# Patient Record
Sex: MALE | Race: WHITE
[De-identification: names, ages, dates, MRNs, and addresses within clinical notes are randomized per-mention and may not be internally consistent; named-entity substitution may affect disease eponyms.]

---

## 2017-11-15 ENCOUNTER — HOSPITAL ENCOUNTER (EMERGENCY)
Dept: HOSPITAL 84 - D.ER | Age: 62
Discharge: HOME | End: 2017-11-15
Payer: MEDICAID

## 2017-11-15 VITALS — BODY MASS INDEX: 23.7 KG/M2

## 2017-11-15 DIAGNOSIS — I44.1: ICD-10-CM

## 2017-11-15 DIAGNOSIS — I45.10: ICD-10-CM

## 2017-11-15 DIAGNOSIS — R07.9: Primary | ICD-10-CM

## 2017-11-15 DIAGNOSIS — I25.10: ICD-10-CM

## 2017-11-15 DIAGNOSIS — I10: ICD-10-CM

## 2017-11-15 LAB
ALBUMIN SERPL-MCNC: 3.8 G/DL (ref 3.4–5)
ALP SERPL-CCNC: 139 U/L (ref 46–116)
ALT SERPL-CCNC: 41 U/L (ref 10–68)
ANION GAP SERPL CALC-SCNC: 12.6 MMOL/L (ref 8–16)
BASOPHILS NFR BLD AUTO: 1 % (ref 0–2)
BILIRUB SERPL-MCNC: 0.44 MG/DL (ref 0.2–1.3)
BUN SERPL-MCNC: 9 MG/DL (ref 7–18)
CALCIUM SERPL-MCNC: 5.6 MG/DL (ref 8.5–10.1)
CHLORIDE SERPL-SCNC: 96 MMOL/L (ref 98–107)
CK MB SERPL-MCNC: 2.5 U/L (ref 0–3.6)
CK SERPL-CCNC: 834 UL (ref 21–232)
CO2 SERPL-SCNC: 30.4 MMOL/L (ref 21–32)
CREAT SERPL-MCNC: 0.9 MG/DL (ref 0.6–1.3)
EOSINOPHIL NFR BLD: 2.5 % (ref 0–7)
ERYTHROCYTE [DISTWIDTH] IN BLOOD BY AUTOMATED COUNT: 17.2 % (ref 11.5–14.5)
GLOBULIN SER-MCNC: 4.9 G/L
GLUCOSE SERPL-MCNC: 84 MG/DL (ref 74–106)
HCT VFR BLD CALC: 39 % (ref 42–54)
HGB BLD-MCNC: 13.3 G/DL (ref 13.5–17.5)
IMM GRANULOCYTES NFR BLD: 0.7 % (ref 0–5)
LYMPHOCYTES NFR BLD AUTO: 42.2 % (ref 15–50)
MCH RBC QN AUTO: 33.8 PG (ref 26–34)
MCHC RBC AUTO-ENTMCNC: 34.1 G/DL (ref 31–37)
MCV RBC: 99.2 FL (ref 80–100)
MONOCYTES NFR BLD: 8.9 % (ref 2–11)
NEUTROPHILS NFR BLD AUTO: 44.7 % (ref 40–80)
OSMOLALITY SERPL CALC.SUM OF ELEC: 269 MOSM/KG (ref 275–300)
PLATELET # BLD: 158 10X3/UL (ref 130–400)
PMV BLD AUTO: 9.8 FL (ref 7.4–10.4)
POTASSIUM SERPL-SCNC: 3 MMOL/L (ref 3.5–5.1)
PROT SERPL-MCNC: 8.7 G/DL (ref 6.4–8.2)
RBC # BLD AUTO: 3.93 10X6/UL (ref 4.2–6.1)
SODIUM SERPL-SCNC: 136 MMOL/L (ref 136–145)
TROPONIN I SERPL-MCNC: 0.11 NG/ML (ref 0–0.06)
WBC # BLD AUTO: 5.9 10X3/UL (ref 4.8–10.8)

## 2017-12-14 ENCOUNTER — HOSPITAL ENCOUNTER (INPATIENT)
Dept: HOSPITAL 84 - D.ER | Age: 62
LOS: 19 days | Discharge: HOME HEALTH | DRG: 896 | End: 2018-01-02
Attending: FAMILY MEDICINE | Admitting: FAMILY MEDICINE
Payer: MEDICAID

## 2017-12-14 VITALS — SYSTOLIC BLOOD PRESSURE: 138 MMHG | DIASTOLIC BLOOD PRESSURE: 84 MMHG

## 2017-12-14 VITALS
BODY MASS INDEX: 28.41 KG/M2 | HEIGHT: 70 IN | WEIGHT: 198.42 LBS | BODY MASS INDEX: 28.41 KG/M2 | BODY MASS INDEX: 28.41 KG/M2 | WEIGHT: 198.42 LBS | BODY MASS INDEX: 28.41 KG/M2 | HEIGHT: 70 IN

## 2017-12-14 VITALS — DIASTOLIC BLOOD PRESSURE: 58 MMHG | SYSTOLIC BLOOD PRESSURE: 102 MMHG
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
## 2017-12-14 DIAGNOSIS — I11.0: ICD-10-CM

## 2017-12-14 DIAGNOSIS — E87.6: ICD-10-CM

## 2017-12-14 DIAGNOSIS — Z91.19: ICD-10-CM

## 2017-12-14 DIAGNOSIS — N17.9: ICD-10-CM

## 2017-12-14 DIAGNOSIS — K29.71: ICD-10-CM

## 2017-12-14 DIAGNOSIS — T88.59XA: ICD-10-CM

## 2017-12-14 DIAGNOSIS — I50.22: ICD-10-CM

## 2017-12-14 DIAGNOSIS — F10.229: Primary | ICD-10-CM

## 2017-12-14 DIAGNOSIS — E03.9: ICD-10-CM

## 2017-12-14 DIAGNOSIS — R00.1: ICD-10-CM

## 2017-12-14 DIAGNOSIS — G47.33: ICD-10-CM

## 2017-12-14 DIAGNOSIS — E83.51: ICD-10-CM

## 2017-12-14 DIAGNOSIS — F10.239: ICD-10-CM

## 2017-12-14 DIAGNOSIS — E86.0: ICD-10-CM

## 2017-12-14 DIAGNOSIS — K44.9: ICD-10-CM

## 2017-12-14 DIAGNOSIS — K29.80: ICD-10-CM

## 2017-12-14 DIAGNOSIS — I95.9: ICD-10-CM

## 2017-12-14 DIAGNOSIS — I34.0: ICD-10-CM

## 2017-12-14 DIAGNOSIS — K70.11: ICD-10-CM

## 2017-12-14 DIAGNOSIS — E46: ICD-10-CM

## 2017-12-14 DIAGNOSIS — D69.6: ICD-10-CM

## 2017-12-14 DIAGNOSIS — E87.0: ICD-10-CM

## 2017-12-14 DIAGNOSIS — G20: ICD-10-CM

## 2017-12-14 DIAGNOSIS — K86.0: ICD-10-CM
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
## 2017-12-14 DIAGNOSIS — F17.200: ICD-10-CM

## 2017-12-14 DIAGNOSIS — F10.27: ICD-10-CM

## 2017-12-14 DIAGNOSIS — K70.40: ICD-10-CM

## 2017-12-14 DIAGNOSIS — K21.9: ICD-10-CM

## 2017-12-14 LAB
ALBUMIN SERPL-MCNC: 3.3 G/DL (ref 3.4–5)
ALBUMIN SERPL-MCNC: 3.7 G/DL (ref 3.4–5)
ALP SERPL-CCNC: 136 U/L (ref 46–116)
ALP SERPL-CCNC: 151 U/L (ref 46–116)
ALT SERPL-CCNC: 111 U/L (ref 10–68)
ALT SERPL-CCNC: 111 U/L (ref 10–68)
AMPHETAMINES UR QL SCN: NEGATIVE QUAL
ANION GAP SERPL CALC-SCNC: 24.3 MMOL/L (ref 8–16)
ANION GAP SERPL CALC-SCNC: 27.8 MMOL/L (ref 8–16)
APPEARANCE UR: (no result)
BACTERIA #/AREA URNS HPF: (no result) /HPF
BARBITURATES UR QL SCN: NEGATIVE QUAL
BASOPHILS NFR BLD AUTO: 0.1 % (ref 0–2)
BASOPHILS NFR BLD AUTO: 0.3 % (ref 0–2)
BENZODIAZ UR QL SCN: NEGATIVE QUAL
BILIRUB SERPL-MCNC: (no result) MG/DL
BILIRUB SERPL-MCNC: 3.52 MG/DL (ref 0.2–1.3)
BILIRUB SERPL-MCNC: 4.42 MG/DL (ref 0.2–1.3)
BUN SERPL-MCNC: 47 MG/DL (ref 7–18)
BUN SERPL-MCNC: 51 MG/DL (ref 7–18)
BZE UR QL SCN: NEGATIVE QUAL
CALCIUM SERPL-MCNC: 4.8 MG/DL (ref 8.5–10.1)
CALCIUM SERPL-MCNC: 5.3 MG/DL (ref 8.5–10.1)
CANNABINOIDS UR QL SCN: NEGATIVE QUAL
CHLORIDE SERPL-SCNC: 100 MMOL/L (ref 98–107)
CHLORIDE SERPL-SCNC: 105 MMOL/L (ref 98–107)
CO2 SERPL-SCNC: 20.8 MMOL/L (ref 21–32)
CO2 SERPL-SCNC: 21.4 MMOL/L (ref 21–32)
COLOR UR: (no result)
CREAT SERPL-MCNC: 1.7 MG/DL (ref 0.6–1.3)
CREAT SERPL-MCNC: 2.5 MG/DL (ref 0.6–1.3)
EOSINOPHIL NFR BLD: 0 % (ref 0–7)
EOSINOPHIL NFR BLD: 0 % (ref 0–7)
ERYTHROCYTE [DISTWIDTH] IN BLOOD BY AUTOMATED COUNT: 17.6 % (ref 11.5–14.5)
ERYTHROCYTE [DISTWIDTH] IN BLOOD BY AUTOMATED COUNT: 17.7 % (ref 11.5–14.5)
ETHANOL SERPL-MCNC: 179 MG/DL (ref 0–10)
GLOBULIN SER-MCNC: 3.8 G/L
GLOBULIN SER-MCNC: 4.2 G/L
GLUCOSE SERPL-MCNC: 102 MG/DL (ref 74–106)
GLUCOSE SERPL-MCNC: 86 MG/DL (ref 74–106)
GLUCOSE SERPL-MCNC: NEGATIVE MG/DL
GRAN CASTS #/AREA URNS LPF: (no result) /LPF
HCT VFR BLD CALC: 26.8 % (ref 42–54)
HCT VFR BLD CALC: 27.1 % (ref 42–54)
HGB BLD-MCNC: 8.9 G/DL (ref 13.5–17.5)
HGB BLD-MCNC: 9.2 G/DL (ref 13.5–17.5)
HYALINE CASTS #/AREA URNS LPF: (no result) /LPF
IMM GRANULOCYTES NFR BLD: 1.2 % (ref 0–5)
IMM GRANULOCYTES NFR BLD: 1.6 % (ref 0–5)
KETONES UR STRIP-MCNC: NEGATIVE MG/DL
LYMPHOCYTES NFR BLD AUTO: 13.5 % (ref 15–50)
LYMPHOCYTES NFR BLD AUTO: 6.6 % (ref 15–50)
MAGNESIUM SERPL-MCNC: 1 MG/DL (ref 1.8–2.4)
MAGNESIUM SERPL-MCNC: 2.1 MG/DL (ref 1.8–2.4)
MCH RBC QN AUTO: 33.8 PG (ref 26–34)
MCH RBC QN AUTO: 34 PG (ref 26–34)
MCHC RBC AUTO-ENTMCNC: 33.2 G/DL (ref 31–37)
MCHC RBC AUTO-ENTMCNC: 33.9 G/DL (ref 31–37)
MCV RBC: 102.3 FL (ref 80–100)
MCV RBC: 99.6 FL (ref 80–100)
MONOCYTES NFR BLD: 6.8 % (ref 2–11)
MONOCYTES NFR BLD: 7.1 % (ref 2–11)
NEUTROPHILS NFR BLD AUTO: 77.5 % (ref 40–80)
NEUTROPHILS NFR BLD AUTO: 85.3 % (ref 40–80)
NITRITE UR-MCNC: NEGATIVE MG/ML
OPIATES UR QL SCN: NEGATIVE QUAL
OSMOLALITY SERPL CALC.SUM OF ELEC: 303 MOSM/KG (ref 275–300)
OSMOLALITY SERPL CALC.SUM OF ELEC: 303 MOSM/KG (ref 275–300)
PCP UR QL SCN: NEGATIVE QUAL
PH UR STRIP: 5 [PH] (ref 5–6)
PLATELET # BLD EST: (no result) 10*3/UL
PLATELET # BLD: 101 10X3/UL (ref 130–400)
PLATELET # BLD: 88 10X3/UL (ref 130–400)
PMV BLD AUTO: 10.6 FL (ref 7.4–10.4)
PMV BLD AUTO: 11.3 FL (ref 7.4–10.4)
POTASSIUM SERPL-SCNC: 3.1 MMOL/L (ref 3.5–5.1)
POTASSIUM SERPL-SCNC: 3.2 MMOL/L (ref 3.5–5.1)
PROT SERPL-MCNC: 7.1 G/DL (ref 6.4–8.2)
PROT SERPL-MCNC: 7.9 G/DL (ref 6.4–8.2)
PROT UR-MCNC: (no result) MG/DL
RBC # BLD AUTO: 2.62 10X6/UL (ref 4.2–6.1)
RBC # BLD AUTO: 2.72 10X6/UL (ref 4.2–6.1)
RBC #/AREA URNS HPF: (no result) /HPF (ref 0–5)
SODIUM SERPL-SCNC: 146 MMOL/L (ref 136–145)
SODIUM SERPL-SCNC: 147 MMOL/L (ref 136–145)
SP GR UR STRIP: 1.02 (ref 1–1.02)
SQUAMOUS #/AREA URNS HPF: (no result) /HPF (ref 0–5)
UROBILINOGEN UR-MCNC: 12 MG/DL
WBC # BLD AUTO: 6.8 10X3/UL (ref 4.8–10.8)
WBC # BLD AUTO: 7.7 10X3/UL (ref 4.8–10.8)
WBC #/AREA URNS HPF: (no result) /HPF (ref 0–5)

## 2017-12-15 VITALS — SYSTOLIC BLOOD PRESSURE: 120 MMHG | DIASTOLIC BLOOD PRESSURE: 64 MMHG

## 2017-12-15 VITALS — SYSTOLIC BLOOD PRESSURE: 159 MMHG | DIASTOLIC BLOOD PRESSURE: 76 MMHG

## 2017-12-15 VITALS — DIASTOLIC BLOOD PRESSURE: 61 MMHG | SYSTOLIC BLOOD PRESSURE: 117 MMHG

## 2017-12-15 VITALS — DIASTOLIC BLOOD PRESSURE: 53 MMHG | SYSTOLIC BLOOD PRESSURE: 113 MMHG

## 2017-12-15 LAB
ALBUMIN SERPL-MCNC: 3.2 G/DL (ref 3.4–5)
ALP SERPL-CCNC: 127 U/L (ref 46–116)
ALT SERPL-CCNC: 117 U/L (ref 10–68)
AMYLASE SERPL-CCNC: 39 U/L (ref 25–115)
ANION GAP SERPL CALC-SCNC: 19.8 MMOL/L (ref 8–16)
APTT BLD: 30.4 SECONDS (ref 22.8–39.4)
BASOPHILS NFR BLD AUTO: 0.2 % (ref 0–2)
BILIRUB SERPL-MCNC: 4.46 MG/DL (ref 0.2–1.3)
BUN SERPL-MCNC: 53 MG/DL (ref 7–18)
CALCIUM SERPL-MCNC: 5.6 MG/DL (ref 8.5–10.1)
CHLORIDE SERPL-SCNC: 106 MMOL/L (ref 98–107)
CO2 SERPL-SCNC: 23.7 MMOL/L (ref 21–32)
CREAT SERPL-MCNC: 1.8 MG/DL (ref 0.6–1.3)
EOSINOPHIL NFR BLD: 0.2 % (ref 0–7)
ERYTHROCYTE [DISTWIDTH] IN BLOOD BY AUTOMATED COUNT: 17.8 % (ref 11.5–14.5)
GLOBULIN SER-MCNC: 3.8 G/L
GLUCOSE SERPL-MCNC: 149 MG/DL (ref 74–106)
HCT VFR BLD CALC: 26.1 % (ref 42–54)
HCV AB S/CO SERPL IA: <0.1 (ref 0–0.9)
HGB BLD-MCNC: 8.7 G/DL (ref 13.5–17.5)
IMM GRANULOCYTES NFR BLD: 0.8 % (ref 0–5)
INR PPP: 1.31 (ref 0.85–1.17)
LIPASE SERPL-CCNC: 117 U/L (ref 73–393)
LYMPHOCYTES NFR BLD AUTO: 10.5 % (ref 15–50)
MCH RBC QN AUTO: 34.5 PG (ref 26–34)
MCHC RBC AUTO-ENTMCNC: 33.3 G/DL (ref 31–37)
MCV RBC: 103.6 FL (ref 80–100)
MONOCYTES NFR BLD: 7.1 % (ref 2–11)
NEUTROPHILS NFR BLD AUTO: 81.2 % (ref 40–80)
OSMOLALITY SERPL CALC.SUM OF ELEC: 307 MOSM/KG (ref 275–300)
PLATELET # BLD: 109 10X3/UL (ref 130–400)
PMV BLD AUTO: 12.2 FL (ref 7.4–10.4)
POTASSIUM SERPL-SCNC: 3.5 MMOL/L (ref 3.5–5.1)
PROT SERPL-MCNC: 7 G/DL (ref 6.4–8.2)
PROTHROMBIN TIME: 15.9 SECONDS (ref 11.6–15)
RBC # BLD AUTO: 2.52 10X6/UL (ref 4.2–6.1)
SODIUM SERPL-SCNC: 146 MMOL/L (ref 136–145)
WBC # BLD AUTO: 6.4 10X3/UL (ref 4.8–10.8)

## 2017-12-15 PROCEDURE — 0DB98ZX EXCISION OF DUODENUM, VIA NATURAL OR ARTIFICIAL OPENING ENDOSCOPIC, DIAGNOSTIC: ICD-10-PCS | Performed by: INTERNAL MEDICINE

## 2017-12-15 PROCEDURE — 0DB78ZX EXCISION OF STOMACH, PYLORUS, VIA NATURAL OR ARTIFICIAL OPENING ENDOSCOPIC, DIAGNOSTIC: ICD-10-PCS | Performed by: INTERNAL MEDICINE

## 2017-12-16 VITALS — SYSTOLIC BLOOD PRESSURE: 127 MMHG | DIASTOLIC BLOOD PRESSURE: 93 MMHG

## 2017-12-16 VITALS — DIASTOLIC BLOOD PRESSURE: 93 MMHG | SYSTOLIC BLOOD PRESSURE: 136 MMHG

## 2017-12-16 VITALS — SYSTOLIC BLOOD PRESSURE: 124 MMHG | DIASTOLIC BLOOD PRESSURE: 72 MMHG

## 2017-12-16 LAB
ALBUMIN SERPL-MCNC: 3.1 G/DL (ref 3.4–5)
ALP SERPL-CCNC: 117 U/L (ref 46–116)
ALT SERPL-CCNC: 161 U/L (ref 10–68)
ANION GAP SERPL CALC-SCNC: 18.7 MMOL/L (ref 8–16)
APPEARANCE UR: (no result)
BACTERIA #/AREA URNS HPF: (no result) /HPF
BASOPHILS NFR BLD AUTO: 0.2 % (ref 0–2)
BILIRUB SERPL-MCNC: 6.1 MG/DL (ref 0.2–1.3)
BILIRUB SERPL-MCNC: NEGATIVE MG/DL
BUN SERPL-MCNC: 61 MG/DL (ref 7–18)
CALCIUM SERPL-MCNC: 5.5 MG/DL (ref 8.5–10.1)
CHLORIDE SERPL-SCNC: 105 MMOL/L (ref 98–107)
CO2 SERPL-SCNC: 23.9 MMOL/L (ref 21–32)
COLOR UR: (no result)
CREAT SERPL-MCNC: 2.2 MG/DL (ref 0.6–1.3)
EOSINOPHIL NFR BLD: 0.4 % (ref 0–7)
ERYTHROCYTE [DISTWIDTH] IN BLOOD BY AUTOMATED COUNT: 18.4 % (ref 11.5–14.5)
GLOBULIN SER-MCNC: 4 G/L
GLUCOSE SERPL-MCNC: 122 MG/DL (ref 74–106)
GLUCOSE SERPL-MCNC: NEGATIVE MG/DL
HCT VFR BLD CALC: 30.5 % (ref 42–54)
HGB BLD-MCNC: 10.2 G/DL (ref 13.5–17.5)
IMM GRANULOCYTES NFR BLD: 0.8 % (ref 0–5)
KETONES UR STRIP-MCNC: NEGATIVE MG/DL
LYMPHOCYTES NFR BLD AUTO: 14.4 % (ref 15–50)
MCH RBC QN AUTO: 34 PG (ref 26–34)
MCHC RBC AUTO-ENTMCNC: 33.4 G/DL (ref 31–37)
MCV RBC: 101.7 FL (ref 80–100)
MONOCYTES NFR BLD: 7 % (ref 2–11)
NEUTROPHILS NFR BLD AUTO: 77.2 % (ref 40–80)
NITRITE UR-MCNC: NEGATIVE MG/ML
OSMOLALITY SERPL CALC.SUM OF ELEC: 304 MOSM/KG (ref 275–300)
PH UR STRIP: 9 [PH] (ref 5–6)
PLATELET # BLD: 96 10X3/UL (ref 130–400)
PMV BLD AUTO: 12.5 FL (ref 7.4–10.4)
POTASSIUM SERPL-SCNC: 3.6 MMOL/L (ref 3.5–5.1)
PROT SERPL-MCNC: 7.1 G/DL (ref 6.4–8.2)
PROT UR-MCNC: (no result) MG/DL
RBC # BLD AUTO: 3 10X6/UL (ref 4.2–6.1)
RBC #/AREA URNS HPF: (no result) /HPF (ref 0–5)
SODIUM SERPL-SCNC: 144 MMOL/L (ref 136–145)
SP GR UR STRIP: 1 (ref 1–1.02)
TRI-PHOS CRY #/AREA URNS HPF: >50 /HPF
TSH SERPL-ACNC: 5.94 UIU/ML (ref 0.36–3.74)
UROBILINOGEN UR-MCNC: NORMAL MG/DL
WBC # BLD AUTO: 4.7 10X3/UL (ref 4.8–10.8)
WBC #/AREA URNS HPF: (no result) /HPF (ref 0–5)

## 2017-12-17 VITALS — DIASTOLIC BLOOD PRESSURE: 77 MMHG | SYSTOLIC BLOOD PRESSURE: 140 MMHG

## 2017-12-17 VITALS — DIASTOLIC BLOOD PRESSURE: 87 MMHG | SYSTOLIC BLOOD PRESSURE: 129 MMHG

## 2017-12-17 VITALS — SYSTOLIC BLOOD PRESSURE: 128 MMHG | DIASTOLIC BLOOD PRESSURE: 78 MMHG

## 2017-12-17 VITALS — SYSTOLIC BLOOD PRESSURE: 121 MMHG | DIASTOLIC BLOOD PRESSURE: 82 MMHG

## 2017-12-17 VITALS — DIASTOLIC BLOOD PRESSURE: 79 MMHG | SYSTOLIC BLOOD PRESSURE: 124 MMHG

## 2017-12-17 VITALS — DIASTOLIC BLOOD PRESSURE: 78 MMHG | SYSTOLIC BLOOD PRESSURE: 119 MMHG

## 2017-12-17 VITALS — DIASTOLIC BLOOD PRESSURE: 55 MMHG | SYSTOLIC BLOOD PRESSURE: 122 MMHG

## 2017-12-17 LAB
ALBUMIN SERPL-MCNC: 3 G/DL (ref 3.4–5)
ALP SERPL-CCNC: 115 U/L (ref 46–116)
ALT SERPL-CCNC: 194 U/L (ref 10–68)
ANION GAP SERPL CALC-SCNC: 18.5 MMOL/L (ref 8–16)
BASOPHILS NFR BLD AUTO: 0.5 % (ref 0–2)
BILIRUB SERPL-MCNC: 6.8 MG/DL (ref 0.2–1.3)
BUN SERPL-MCNC: 64 MG/DL (ref 7–18)
CALCIUM SERPL-MCNC: 5.4 MG/DL (ref 8.5–10.1)
CHLORIDE SERPL-SCNC: 106 MMOL/L (ref 98–107)
CO2 SERPL-SCNC: 22 MMOL/L (ref 21–32)
CREAT SERPL-MCNC: 2.1 MG/DL (ref 0.6–1.3)
EOSINOPHIL NFR BLD: 1.3 % (ref 0–7)
ERYTHROCYTE [DISTWIDTH] IN BLOOD BY AUTOMATED COUNT: 19.1 % (ref 11.5–14.5)
GLOBULIN SER-MCNC: 3.9 G/L
GLUCOSE SERPL-MCNC: 143 MG/DL (ref 74–106)
HCT VFR BLD CALC: 32.3 % (ref 42–54)
HGB BLD-MCNC: 10.7 G/DL (ref 13.5–17.5)
IMM GRANULOCYTES NFR BLD: 1 % (ref 0–5)
INR PPP: 1.45 (ref 0.85–1.17)
LYMPHOCYTES NFR BLD AUTO: 14.3 % (ref 15–50)
MCH RBC QN AUTO: 33.8 PG (ref 26–34)
MCHC RBC AUTO-ENTMCNC: 33.1 G/DL (ref 31–37)
MCV RBC: 101.9 FL (ref 80–100)
MONOCYTES NFR BLD: 9 % (ref 2–11)
NEUTROPHILS NFR BLD AUTO: 73.9 % (ref 40–80)
OSMOLALITY SERPL CALC.SUM OF ELEC: 304 MOSM/KG (ref 275–300)
PLATELET # BLD: 107 10X3/UL (ref 130–400)
PMV BLD AUTO: 13.8 FL (ref 7.4–10.4)
POTASSIUM SERPL-SCNC: 3.5 MMOL/L (ref 3.5–5.1)
PROT SERPL-MCNC: 6.9 G/DL (ref 6.4–8.2)
PROTHROMBIN TIME: 17.2 SECONDS (ref 11.6–15)
RBC # BLD AUTO: 3.17 10X6/UL (ref 4.2–6.1)
SODIUM SERPL-SCNC: 143 MMOL/L (ref 136–145)
WBC # BLD AUTO: 6.1 10X3/UL (ref 4.8–10.8)

## 2017-12-18 VITALS — DIASTOLIC BLOOD PRESSURE: 79 MMHG | SYSTOLIC BLOOD PRESSURE: 129 MMHG

## 2017-12-18 VITALS — SYSTOLIC BLOOD PRESSURE: 125 MMHG | DIASTOLIC BLOOD PRESSURE: 86 MMHG

## 2017-12-18 VITALS — SYSTOLIC BLOOD PRESSURE: 135 MMHG | DIASTOLIC BLOOD PRESSURE: 77 MMHG

## 2017-12-18 VITALS — DIASTOLIC BLOOD PRESSURE: 82 MMHG | SYSTOLIC BLOOD PRESSURE: 137 MMHG

## 2017-12-18 VITALS — DIASTOLIC BLOOD PRESSURE: 86 MMHG | SYSTOLIC BLOOD PRESSURE: 137 MMHG

## 2017-12-18 LAB
ALBUMIN SERPL-MCNC: 2.8 G/DL (ref 3.4–5)
ALP SERPL-CCNC: 119 U/L (ref 46–116)
ALT SERPL-CCNC: 200 U/L (ref 10–68)
ANION GAP SERPL CALC-SCNC: 16.2 MMOL/L (ref 8–16)
BASOPHILS NFR BLD AUTO: 0.2 % (ref 0–2)
BILIRUB DIRECT SERPL-MCNC: 4.93 MG/DL (ref 0–0.3)
BILIRUB INDIRECT SERPL-MCNC: 0.87 MG/DL (ref 0–1)
BILIRUB SERPL-MCNC: 5.8 MG/DL (ref 0.2–1.3)
BUN SERPL-MCNC: 60 MG/DL (ref 7–18)
CALCIUM SERPL-MCNC: 5.4 MG/DL (ref 8.5–10.1)
CHLORIDE SERPL-SCNC: 108 MMOL/L (ref 98–107)
CO2 SERPL-SCNC: 23.9 MMOL/L (ref 21–32)
CREAT SERPL-MCNC: 1.7 MG/DL (ref 0.6–1.3)
EOSINOPHIL NFR BLD: 1.4 % (ref 0–7)
ERYTHROCYTE [DISTWIDTH] IN BLOOD BY AUTOMATED COUNT: 19.1 % (ref 11.5–14.5)
GLOBULIN SER-MCNC: 3.7 G/L
GLUCOSE SERPL-MCNC: 116 MG/DL (ref 74–106)
HCT VFR BLD CALC: 32 % (ref 42–54)
HGB BLD-MCNC: 10.8 G/DL (ref 13.5–17.5)
IMM GRANULOCYTES NFR BLD: 1.1 % (ref 0–5)
INR PPP: 1.38 (ref 0.85–1.17)
LYMPHOCYTES NFR BLD AUTO: 15.1 % (ref 15–50)
MAGNESIUM SERPL-MCNC: 2.5 MG/DL (ref 1.8–2.4)
MCH RBC QN AUTO: 34.8 PG (ref 26–34)
MCHC RBC AUTO-ENTMCNC: 33.8 G/DL (ref 31–37)
MCV RBC: 103.2 FL (ref 80–100)
MONOCYTES NFR BLD: 12 % (ref 2–11)
NEUTROPHILS NFR BLD AUTO: 70.2 % (ref 40–80)
OSMOLALITY SERPL CALC.SUM OF ELEC: 306 MOSM/KG (ref 275–300)
PHOSPHATE SERPL-MCNC: 3.9 MG/DL (ref 2.5–4.9)
PLATELET # BLD: 112 10X3/UL (ref 130–400)
PMV BLD AUTO: 12.6 FL (ref 7.4–10.4)
POTASSIUM SERPL-SCNC: 3.1 MMOL/L (ref 3.5–5.1)
PROT SERPL-MCNC: 6.5 G/DL (ref 6.4–8.2)
PROTHROMBIN TIME: 16.5 SECONDS (ref 11.6–15)
RBC # BLD AUTO: 3.1 10X6/UL (ref 4.2–6.1)
SODIUM SERPL-SCNC: 145 MMOL/L (ref 136–145)
WBC # BLD AUTO: 6.5 10X3/UL (ref 4.8–10.8)

## 2017-12-19 VITALS — SYSTOLIC BLOOD PRESSURE: 112 MMHG | DIASTOLIC BLOOD PRESSURE: 82 MMHG

## 2017-12-19 VITALS — SYSTOLIC BLOOD PRESSURE: 119 MMHG | DIASTOLIC BLOOD PRESSURE: 78 MMHG

## 2017-12-19 VITALS — SYSTOLIC BLOOD PRESSURE: 128 MMHG | DIASTOLIC BLOOD PRESSURE: 74 MMHG

## 2017-12-19 VITALS — SYSTOLIC BLOOD PRESSURE: 131 MMHG | DIASTOLIC BLOOD PRESSURE: 80 MMHG

## 2017-12-19 LAB
ALBUMIN SERPL-MCNC: 2.8 G/DL (ref 3.4–5)
ALP SERPL-CCNC: 160 U/L (ref 46–116)
ALT SERPL-CCNC: 217 U/L (ref 10–68)
ANION GAP SERPL CALC-SCNC: 17.3 MMOL/L (ref 8–16)
BASOPHILS NFR BLD AUTO: 0.3 % (ref 0–2)
BILIRUB DIRECT SERPL-MCNC: 4.37 MG/DL (ref 0–0.3)
BILIRUB INDIRECT SERPL-MCNC: 1.03 MG/DL (ref 0–1)
BILIRUB SERPL-MCNC: 5.4 MG/DL (ref 0.2–1.3)
BUN SERPL-MCNC: 53 MG/DL (ref 7–18)
CALCIUM SERPL-MCNC: 5.6 MG/DL (ref 8.5–10.1)
CHLORIDE SERPL-SCNC: 109 MMOL/L (ref 98–107)
CO2 SERPL-SCNC: 19.6 MMOL/L (ref 21–32)
CREAT SERPL-MCNC: 1.4 MG/DL (ref 0.6–1.3)
EOSINOPHIL NFR BLD: 1.3 % (ref 0–7)
ERYTHROCYTE [DISTWIDTH] IN BLOOD BY AUTOMATED COUNT: 19.9 % (ref 11.5–14.5)
GLOBULIN SER-MCNC: 3.3 G/L
GLUCOSE SERPL-MCNC: 128 MG/DL (ref 74–106)
HCT VFR BLD CALC: 32.6 % (ref 42–54)
HGB BLD-MCNC: 10.5 G/DL (ref 13.5–17.5)
IMM GRANULOCYTES NFR BLD: 1.5 % (ref 0–5)
INR PPP: 1.29 (ref 0.85–1.17)
LYMPHOCYTES NFR BLD AUTO: 15.7 % (ref 15–50)
MAGNESIUM SERPL-MCNC: 2.2 MG/DL (ref 1.8–2.4)
MCH RBC QN AUTO: 33.8 PG (ref 26–34)
MCHC RBC AUTO-ENTMCNC: 32.2 G/DL (ref 31–37)
MCV RBC: 104.8 FL (ref 80–100)
MONOCYTES NFR BLD: 14.7 % (ref 2–11)
NEUTROPHILS NFR BLD AUTO: 66.5 % (ref 40–80)
OSMOLALITY SERPL CALC.SUM OF ELEC: 298 MOSM/KG (ref 275–300)
PHOSPHATE SERPL-MCNC: 3.4 MG/DL (ref 2.5–4.9)
PLATELET # BLD: 104 10X3/UL (ref 130–400)
PMV BLD AUTO: 13.6 FL (ref 7.4–10.4)
POTASSIUM SERPL-SCNC: 3.9 MMOL/L (ref 3.5–5.1)
PROT SERPL-MCNC: 6.1 G/DL (ref 6.4–8.2)
PROTHROMBIN TIME: 15.6 SECONDS (ref 11.6–15)
RBC # BLD AUTO: 3.11 10X6/UL (ref 4.2–6.1)
SODIUM SERPL-SCNC: 142 MMOL/L (ref 136–145)
WBC # BLD AUTO: 5.9 10X3/UL (ref 4.8–10.8)

## 2017-12-20 VITALS — SYSTOLIC BLOOD PRESSURE: 143 MMHG | DIASTOLIC BLOOD PRESSURE: 75 MMHG

## 2017-12-20 VITALS — DIASTOLIC BLOOD PRESSURE: 81 MMHG | SYSTOLIC BLOOD PRESSURE: 142 MMHG

## 2017-12-20 VITALS — DIASTOLIC BLOOD PRESSURE: 78 MMHG | SYSTOLIC BLOOD PRESSURE: 136 MMHG

## 2017-12-20 VITALS — DIASTOLIC BLOOD PRESSURE: 78 MMHG | SYSTOLIC BLOOD PRESSURE: 138 MMHG

## 2017-12-20 LAB
ALBUMIN SERPL-MCNC: 2.5 G/DL (ref 3.4–5)
ALP SERPL-CCNC: 196 U/L (ref 46–116)
ALT SERPL-CCNC: 186 U/L (ref 10–68)
ANION GAP SERPL CALC-SCNC: 13.2 MMOL/L (ref 8–16)
BASOPHILS NFR BLD AUTO: 0.2 % (ref 0–2)
BILIRUB DIRECT SERPL-MCNC: 3.75 MG/DL (ref 0–0.3)
BILIRUB INDIRECT SERPL-MCNC: 0.75 MG/DL (ref 0–1)
BILIRUB SERPL-MCNC: 4.5 MG/DL (ref 0.2–1.3)
BUN SERPL-MCNC: 40 MG/DL (ref 7–18)
CALCIUM SERPL-MCNC: 5.8 MG/DL (ref 8.5–10.1)
CHLORIDE SERPL-SCNC: 110 MMOL/L (ref 98–107)
CO2 SERPL-SCNC: 22.3 MMOL/L (ref 21–32)
CREAT SERPL-MCNC: 1.3 MG/DL (ref 0.6–1.3)
EOSINOPHIL NFR BLD: 1.1 % (ref 0–7)
ERYTHROCYTE [DISTWIDTH] IN BLOOD BY AUTOMATED COUNT: 19.5 % (ref 11.5–14.5)
GLOBULIN SER-MCNC: 3.7 G/L
GLUCOSE SERPL-MCNC: 109 MG/DL (ref 74–106)
HCT VFR BLD CALC: 31.8 % (ref 42–54)
HGB BLD-MCNC: 10.5 G/DL (ref 13.5–17.5)
IMM GRANULOCYTES NFR BLD: 1.6 % (ref 0–5)
INR PPP: 1.25 (ref 0.85–1.17)
LYMPHOCYTES NFR BLD AUTO: 14.4 % (ref 15–50)
MCH RBC QN AUTO: 34.1 PG (ref 26–34)
MCHC RBC AUTO-ENTMCNC: 33 G/DL (ref 31–37)
MCV RBC: 103.2 FL (ref 80–100)
MONOCYTES NFR BLD: 14.6 % (ref 2–11)
NEUTROPHILS NFR BLD AUTO: 68.1 % (ref 40–80)
OSMOLALITY SERPL CALC.SUM OF ELEC: 293 MOSM/KG (ref 275–300)
PLATELET # BLD: 115 10X3/UL (ref 130–400)
PMV BLD AUTO: 13 FL (ref 7.4–10.4)
POTASSIUM SERPL-SCNC: 3.5 MMOL/L (ref 3.5–5.1)
PROT SERPL-MCNC: 6.2 G/DL (ref 6.4–8.2)
PROTHROMBIN TIME: 15.2 SECONDS (ref 11.6–15)
RBC # BLD AUTO: 3.08 10X6/UL (ref 4.2–6.1)
SODIUM SERPL-SCNC: 142 MMOL/L (ref 136–145)
WBC # BLD AUTO: 4.5 10X3/UL (ref 4.8–10.8)

## 2017-12-21 VITALS — DIASTOLIC BLOOD PRESSURE: 86 MMHG | SYSTOLIC BLOOD PRESSURE: 147 MMHG

## 2017-12-21 VITALS — SYSTOLIC BLOOD PRESSURE: 146 MMHG | DIASTOLIC BLOOD PRESSURE: 80 MMHG

## 2017-12-21 VITALS — DIASTOLIC BLOOD PRESSURE: 86 MMHG | SYSTOLIC BLOOD PRESSURE: 155 MMHG

## 2017-12-21 VITALS — SYSTOLIC BLOOD PRESSURE: 116 MMHG | DIASTOLIC BLOOD PRESSURE: 81 MMHG

## 2017-12-21 VITALS — DIASTOLIC BLOOD PRESSURE: 84 MMHG | SYSTOLIC BLOOD PRESSURE: 136 MMHG

## 2017-12-22 VITALS — SYSTOLIC BLOOD PRESSURE: 159 MMHG | DIASTOLIC BLOOD PRESSURE: 91 MMHG

## 2017-12-22 VITALS — SYSTOLIC BLOOD PRESSURE: 122 MMHG | DIASTOLIC BLOOD PRESSURE: 76 MMHG

## 2017-12-22 VITALS — DIASTOLIC BLOOD PRESSURE: 84 MMHG | SYSTOLIC BLOOD PRESSURE: 157 MMHG

## 2017-12-22 VITALS — SYSTOLIC BLOOD PRESSURE: 149 MMHG | DIASTOLIC BLOOD PRESSURE: 89 MMHG

## 2017-12-22 VITALS — DIASTOLIC BLOOD PRESSURE: 86 MMHG | SYSTOLIC BLOOD PRESSURE: 146 MMHG

## 2017-12-22 VITALS — SYSTOLIC BLOOD PRESSURE: 152 MMHG | DIASTOLIC BLOOD PRESSURE: 86 MMHG

## 2017-12-22 VITALS — SYSTOLIC BLOOD PRESSURE: 146 MMHG | DIASTOLIC BLOOD PRESSURE: 82 MMHG

## 2017-12-22 LAB
ALBUMIN SERPL-MCNC: 2.7 G/DL (ref 3.4–5)
ALP SERPL-CCNC: 347 U/L (ref 46–116)
ALT SERPL-CCNC: 162 U/L (ref 10–68)
ANION GAP SERPL CALC-SCNC: 14.3 MMOL/L (ref 8–16)
BASOPHILS NFR BLD AUTO: 0.3 % (ref 0–2)
BILIRUB DIRECT SERPL-MCNC: 3.16 MG/DL (ref 0–0.3)
BILIRUB INDIRECT SERPL-MCNC: 1.04 MG/DL (ref 0–1)
BILIRUB SERPL-MCNC: 4.2 MG/DL (ref 0.2–1.3)
BUN SERPL-MCNC: 21 MG/DL (ref 7–18)
CALCIUM SERPL-MCNC: 6.3 MG/DL (ref 8.5–10.1)
CHLORIDE SERPL-SCNC: 109 MMOL/L (ref 98–107)
CO2 SERPL-SCNC: 22.1 MMOL/L (ref 21–32)
CREAT SERPL-MCNC: 1.1 MG/DL (ref 0.6–1.3)
EOSINOPHIL NFR BLD: 1.2 % (ref 0–7)
ERYTHROCYTE [DISTWIDTH] IN BLOOD BY AUTOMATED COUNT: 19.1 % (ref 11.5–14.5)
GLOBULIN SER-MCNC: 3.9 G/L
GLUCOSE SERPL-MCNC: 101 MG/DL (ref 74–106)
HCT VFR BLD CALC: 34.1 % (ref 42–54)
HGB BLD-MCNC: 11.2 G/DL (ref 13.5–17.5)
IMM GRANULOCYTES NFR BLD: 0.6 % (ref 0–5)
LYMPHOCYTES NFR BLD AUTO: 14.5 % (ref 15–50)
MCH RBC QN AUTO: 33.8 PG (ref 26–34)
MCHC RBC AUTO-ENTMCNC: 32.8 G/DL (ref 31–37)
MCV RBC: 103 FL (ref 80–100)
MONOCYTES NFR BLD: 11.1 % (ref 2–11)
NEUTROPHILS NFR BLD AUTO: 72.3 % (ref 40–80)
OSMOLALITY SERPL CALC.SUM OF ELEC: 285 MOSM/KG (ref 275–300)
PLATELET # BLD: 149 10X3/UL (ref 130–400)
PMV BLD AUTO: 12 FL (ref 7.4–10.4)
POTASSIUM SERPL-SCNC: 3.4 MMOL/L (ref 3.5–5.1)
PROT SERPL-MCNC: 6.6 G/DL (ref 6.4–8.2)
RBC # BLD AUTO: 3.31 10X6/UL (ref 4.2–6.1)
SODIUM SERPL-SCNC: 142 MMOL/L (ref 136–145)
WBC # BLD AUTO: 6.4 10X3/UL (ref 4.8–10.8)

## 2017-12-23 VITALS — SYSTOLIC BLOOD PRESSURE: 144 MMHG | DIASTOLIC BLOOD PRESSURE: 80 MMHG

## 2017-12-23 VITALS — DIASTOLIC BLOOD PRESSURE: 74 MMHG | SYSTOLIC BLOOD PRESSURE: 137 MMHG

## 2017-12-23 VITALS — DIASTOLIC BLOOD PRESSURE: 76 MMHG | SYSTOLIC BLOOD PRESSURE: 178 MMHG

## 2017-12-23 VITALS — SYSTOLIC BLOOD PRESSURE: 144 MMHG | DIASTOLIC BLOOD PRESSURE: 75 MMHG

## 2017-12-23 VITALS — DIASTOLIC BLOOD PRESSURE: 84 MMHG | SYSTOLIC BLOOD PRESSURE: 152 MMHG

## 2017-12-24 VITALS — DIASTOLIC BLOOD PRESSURE: 75 MMHG | SYSTOLIC BLOOD PRESSURE: 143 MMHG

## 2017-12-24 VITALS — DIASTOLIC BLOOD PRESSURE: 75 MMHG | SYSTOLIC BLOOD PRESSURE: 169 MMHG

## 2017-12-24 VITALS — DIASTOLIC BLOOD PRESSURE: 72 MMHG | SYSTOLIC BLOOD PRESSURE: 139 MMHG

## 2017-12-24 VITALS — DIASTOLIC BLOOD PRESSURE: 75 MMHG | SYSTOLIC BLOOD PRESSURE: 155 MMHG

## 2017-12-24 VITALS — SYSTOLIC BLOOD PRESSURE: 166 MMHG | DIASTOLIC BLOOD PRESSURE: 70 MMHG

## 2017-12-24 VITALS — SYSTOLIC BLOOD PRESSURE: 165 MMHG | DIASTOLIC BLOOD PRESSURE: 85 MMHG

## 2017-12-24 LAB
ALBUMIN SERPL-MCNC: 2.5 G/DL (ref 3.4–5)
ALP SERPL-CCNC: 336 U/L (ref 46–116)
ALT SERPL-CCNC: 122 U/L (ref 10–68)
ANION GAP SERPL CALC-SCNC: 14.6 MMOL/L (ref 8–16)
BACTERIA ISLT: (no result)
BASOPHILS NFR BLD AUTO: 0.4 % (ref 0–2)
BILIRUB DIRECT SERPL-MCNC: 2.34 MG/DL (ref 0–0.3)
BILIRUB INDIRECT SERPL-MCNC: 0.85 MG/DL (ref 0–1)
BILIRUB SERPL-MCNC: 3.19 MG/DL (ref 0.2–1.3)
BUN SERPL-MCNC: 15 MG/DL (ref 7–18)
CALCIUM SERPL-MCNC: 7 MG/DL (ref 8.5–10.1)
CHLORIDE SERPL-SCNC: 108 MMOL/L (ref 98–107)
CO2 SERPL-SCNC: 24.6 MMOL/L (ref 21–32)
CREAT SERPL-MCNC: 0.9 MG/DL (ref 0.6–1.3)
EOSINOPHIL NFR BLD: 1 % (ref 0–7)
ERYTHROCYTE [DISTWIDTH] IN BLOOD BY AUTOMATED COUNT: 18.9 % (ref 11.5–14.5)
GLOBULIN SER-MCNC: 3.3 G/L
GLUCOSE SERPL-MCNC: 95 MG/DL (ref 74–106)
HCT VFR BLD CALC: 32.1 % (ref 42–54)
HGB BLD-MCNC: 10.6 G/DL (ref 13.5–17.5)
IMM GRANULOCYTES NFR BLD: 0.6 % (ref 0–5)
LYMPHOCYTES NFR BLD AUTO: 15.6 % (ref 15–50)
MAGNESIUM SERPL-MCNC: 1.2 MG/DL (ref 1.8–2.4)
MCH RBC QN AUTO: 34.1 PG (ref 26–34)
MCHC RBC AUTO-ENTMCNC: 33 G/DL (ref 31–37)
MCV RBC: 103.2 FL (ref 80–100)
MONOCYTES NFR BLD: 9.3 % (ref 2–11)
NEUTROPHILS NFR BLD AUTO: 73.1 % (ref 40–80)
OSMOLALITY SERPL CALC.SUM OF ELEC: 285 MOSM/KG (ref 275–300)
PHOSPHATE SERPL-MCNC: 3 MG/DL (ref 2.5–4.9)
PLATELET # BLD: 183 10X3/UL (ref 130–400)
PMV BLD AUTO: 12.1 FL (ref 7.4–10.4)
POTASSIUM SERPL-SCNC: 4.2 MMOL/L (ref 3.5–5.1)
PROT SERPL-MCNC: 5.8 G/DL (ref 6.4–8.2)
RBC # BLD AUTO: 3.11 10X6/UL (ref 4.2–6.1)
SODIUM SERPL-SCNC: 143 MMOL/L (ref 136–145)
WBC # BLD AUTO: 6.9 10X3/UL (ref 4.8–10.8)

## 2017-12-25 VITALS — DIASTOLIC BLOOD PRESSURE: 86 MMHG | SYSTOLIC BLOOD PRESSURE: 158 MMHG

## 2017-12-25 VITALS — DIASTOLIC BLOOD PRESSURE: 84 MMHG | SYSTOLIC BLOOD PRESSURE: 135 MMHG

## 2017-12-25 VITALS — SYSTOLIC BLOOD PRESSURE: 137 MMHG | DIASTOLIC BLOOD PRESSURE: 74 MMHG

## 2017-12-25 VITALS — DIASTOLIC BLOOD PRESSURE: 66 MMHG | SYSTOLIC BLOOD PRESSURE: 133 MMHG

## 2017-12-25 VITALS — DIASTOLIC BLOOD PRESSURE: 70 MMHG | SYSTOLIC BLOOD PRESSURE: 149 MMHG

## 2017-12-25 VITALS — DIASTOLIC BLOOD PRESSURE: 67 MMHG | SYSTOLIC BLOOD PRESSURE: 133 MMHG

## 2017-12-25 LAB
AMYLASE SERPL-CCNC: 94 U/L (ref 25–115)
LIPASE SERPL-CCNC: 551 U/L (ref 73–393)
MAGNESIUM SERPL-MCNC: 1.3 MG/DL (ref 1.8–2.4)
PHOSPHATE SERPL-MCNC: 3.4 MG/DL (ref 2.5–4.9)

## 2017-12-26 VITALS — SYSTOLIC BLOOD PRESSURE: 125 MMHG | DIASTOLIC BLOOD PRESSURE: 70 MMHG

## 2017-12-26 VITALS — DIASTOLIC BLOOD PRESSURE: 78 MMHG | SYSTOLIC BLOOD PRESSURE: 140 MMHG

## 2017-12-26 VITALS — SYSTOLIC BLOOD PRESSURE: 122 MMHG | DIASTOLIC BLOOD PRESSURE: 61 MMHG

## 2017-12-26 VITALS — SYSTOLIC BLOOD PRESSURE: 125 MMHG | DIASTOLIC BLOOD PRESSURE: 68 MMHG

## 2017-12-26 VITALS — DIASTOLIC BLOOD PRESSURE: 69 MMHG | SYSTOLIC BLOOD PRESSURE: 139 MMHG

## 2017-12-26 LAB
ALBUMIN SERPL-MCNC: 2.2 G/DL (ref 3.4–5)
ALP SERPL-CCNC: 239 U/L (ref 46–116)
ALT SERPL-CCNC: 82 U/L (ref 10–68)
ANION GAP SERPL CALC-SCNC: 10.4 MMOL/L (ref 8–16)
BASOPHILS NFR BLD AUTO: 0.4 % (ref 0–2)
BILIRUB SERPL-MCNC: 1.63 MG/DL (ref 0.2–1.3)
BUN SERPL-MCNC: 14 MG/DL (ref 7–18)
CALCIUM SERPL-MCNC: 6.6 MG/DL (ref 8.5–10.1)
CHLORIDE SERPL-SCNC: 106 MMOL/L (ref 98–107)
CO2 SERPL-SCNC: 28.5 MMOL/L (ref 21–32)
CREAT SERPL-MCNC: 1 MG/DL (ref 0.6–1.3)
EOSINOPHIL NFR BLD: 0.9 % (ref 0–7)
ERYTHROCYTE [DISTWIDTH] IN BLOOD BY AUTOMATED COUNT: 18.1 % (ref 11.5–14.5)
GLOBULIN SER-MCNC: 3.6 G/L
GLUCOSE SERPL-MCNC: 81 MG/DL (ref 74–106)
HCT VFR BLD CALC: 31.4 % (ref 42–54)
HGB BLD-MCNC: 10.2 G/DL (ref 13.5–17.5)
IMM GRANULOCYTES NFR BLD: 1 % (ref 0–5)
LYMPHOCYTES NFR BLD AUTO: 14.9 % (ref 15–50)
MCH RBC QN AUTO: 34.2 PG (ref 26–34)
MCHC RBC AUTO-ENTMCNC: 32.5 G/DL (ref 31–37)
MCV RBC: 105.4 FL (ref 80–100)
MONOCYTES NFR BLD: 9.3 % (ref 2–11)
NEUTROPHILS NFR BLD AUTO: 73.5 % (ref 40–80)
OSMOLALITY SERPL CALC.SUM OF ELEC: 280 MOSM/KG (ref 275–300)
PHOSPHATE SERPL-MCNC: 3.5 MG/DL (ref 2.5–4.9)
PLATELET # BLD: 199 10X3/UL (ref 130–400)
PMV BLD AUTO: 10.9 FL (ref 7.4–10.4)
POTASSIUM SERPL-SCNC: 3.9 MMOL/L (ref 3.5–5.1)
PROT SERPL-MCNC: 5.8 G/DL (ref 6.4–8.2)
RBC # BLD AUTO: 2.98 10X6/UL (ref 4.2–6.1)
SODIUM SERPL-SCNC: 141 MMOL/L (ref 136–145)
WBC # BLD AUTO: 7.7 10X3/UL (ref 4.8–10.8)

## 2017-12-27 VITALS — DIASTOLIC BLOOD PRESSURE: 90 MMHG | SYSTOLIC BLOOD PRESSURE: 171 MMHG

## 2017-12-27 VITALS — DIASTOLIC BLOOD PRESSURE: 77 MMHG | SYSTOLIC BLOOD PRESSURE: 142 MMHG

## 2017-12-27 VITALS — SYSTOLIC BLOOD PRESSURE: 144 MMHG | DIASTOLIC BLOOD PRESSURE: 70 MMHG

## 2017-12-27 VITALS — DIASTOLIC BLOOD PRESSURE: 74 MMHG | SYSTOLIC BLOOD PRESSURE: 144 MMHG

## 2017-12-27 VITALS — SYSTOLIC BLOOD PRESSURE: 100 MMHG | DIASTOLIC BLOOD PRESSURE: 51 MMHG

## 2017-12-27 VITALS — DIASTOLIC BLOOD PRESSURE: 72 MMHG | SYSTOLIC BLOOD PRESSURE: 133 MMHG

## 2017-12-27 LAB
ALBUMIN SERPL-MCNC: 2 G/DL (ref 3.4–5)
ALP SERPL-CCNC: 200 U/L (ref 46–116)
ALT SERPL-CCNC: 63 U/L (ref 10–68)
AMYLASE SERPL-CCNC: 119 U/L (ref 25–115)
ANION GAP SERPL CALC-SCNC: 9.6 MMOL/L (ref 8–16)
BASOPHILS NFR BLD AUTO: 0.1 % (ref 0–2)
BILIRUB SERPL-MCNC: 1.4 MG/DL (ref 0.2–1.3)
BUN SERPL-MCNC: 18 MG/DL (ref 7–18)
CALCIUM SERPL-MCNC: 6.9 MG/DL (ref 8.5–10.1)
CHLORIDE SERPL-SCNC: 105 MMOL/L (ref 98–107)
CO2 SERPL-SCNC: 29.3 MMOL/L (ref 21–32)
CREAT SERPL-MCNC: 0.8 MG/DL (ref 0.6–1.3)
EOSINOPHIL NFR BLD: 0.9 % (ref 0–7)
ERYTHROCYTE [DISTWIDTH] IN BLOOD BY AUTOMATED COUNT: 17.9 % (ref 11.5–14.5)
GLOBULIN SER-MCNC: 3.5 G/L
GLUCOSE SERPL-MCNC: 103 MG/DL (ref 74–106)
HCT VFR BLD CALC: 31 % (ref 42–54)
HGB BLD-MCNC: 10 G/DL (ref 13.5–17.5)
IMM GRANULOCYTES NFR BLD: 0.6 % (ref 0–5)
LIPASE SERPL-CCNC: 656 U/L (ref 73–393)
LYMPHOCYTES NFR BLD AUTO: 12.6 % (ref 15–50)
MAGNESIUM SERPL-MCNC: 1.4 MG/DL (ref 1.8–2.4)
MCH RBC QN AUTO: 33.6 PG (ref 26–34)
MCHC RBC AUTO-ENTMCNC: 32.3 G/DL (ref 31–37)
MCV RBC: 104 FL (ref 80–100)
MONOCYTES NFR BLD: 8.5 % (ref 2–11)
NEUTROPHILS NFR BLD AUTO: 77.3 % (ref 40–80)
OSMOLALITY SERPL CALC.SUM OF ELEC: 280 MOSM/KG (ref 275–300)
PHOSPHATE SERPL-MCNC: 2.7 MG/DL (ref 2.5–4.9)
PLATELET # BLD: 208 10X3/UL (ref 130–400)
PMV BLD AUTO: 10.9 FL (ref 7.4–10.4)
POTASSIUM SERPL-SCNC: 3.9 MMOL/L (ref 3.5–5.1)
PROT SERPL-MCNC: 5.5 G/DL (ref 6.4–8.2)
RBC # BLD AUTO: 2.98 10X6/UL (ref 4.2–6.1)
SODIUM SERPL-SCNC: 140 MMOL/L (ref 136–145)
WBC # BLD AUTO: 7 10X3/UL (ref 4.8–10.8)

## 2017-12-28 VITALS — DIASTOLIC BLOOD PRESSURE: 50 MMHG | SYSTOLIC BLOOD PRESSURE: 107 MMHG

## 2017-12-28 VITALS — SYSTOLIC BLOOD PRESSURE: 152 MMHG | DIASTOLIC BLOOD PRESSURE: 64 MMHG

## 2017-12-28 VITALS — SYSTOLIC BLOOD PRESSURE: 110 MMHG | DIASTOLIC BLOOD PRESSURE: 56 MMHG

## 2017-12-28 VITALS — DIASTOLIC BLOOD PRESSURE: 53 MMHG | SYSTOLIC BLOOD PRESSURE: 109 MMHG

## 2017-12-28 LAB
ALBUMIN SERPL-MCNC: 2.3 G/DL (ref 3.4–5)
ALP SERPL-CCNC: 200 U/L (ref 46–116)
ALT SERPL-CCNC: 61 U/L (ref 10–68)
ANION GAP SERPL CALC-SCNC: 8.5 MMOL/L (ref 8–16)
APPEARANCE UR: (no result)
BACTERIA #/AREA URNS HPF: (no result) /HPF
BASOPHILS NFR BLD AUTO: 0.3 % (ref 0–2)
BILIRUB SERPL-MCNC: 1.47 MG/DL (ref 0.2–1.3)
BILIRUB SERPL-MCNC: NEGATIVE MG/DL
BUN SERPL-MCNC: 22 MG/DL (ref 7–18)
CALCIUM SERPL-MCNC: 7.8 MG/DL (ref 8.5–10.1)
CHLORIDE SERPL-SCNC: 102 MMOL/L (ref 98–107)
CO2 SERPL-SCNC: 32.2 MMOL/L (ref 21–32)
COLOR UR: YELLOW
CREAT SERPL-MCNC: 1 MG/DL (ref 0.6–1.3)
EOSINOPHIL NFR BLD: 0.6 % (ref 0–7)
ERYTHROCYTE [DISTWIDTH] IN BLOOD BY AUTOMATED COUNT: 17.3 % (ref 11.5–14.5)
GLOBULIN SER-MCNC: 3.4 G/L
GLUCOSE SERPL-MCNC: 83 MG/DL (ref 74–106)
GLUCOSE SERPL-MCNC: NEGATIVE MG/DL
HCT VFR BLD CALC: 29.9 % (ref 42–54)
HGB BLD-MCNC: 9.8 G/DL (ref 13.5–17.5)
IMM GRANULOCYTES NFR BLD: 0.3 % (ref 0–5)
KETONES UR STRIP-MCNC: NEGATIVE MG/DL
LIPASE SERPL-CCNC: 480 U/L (ref 73–393)
LYMPHOCYTES NFR BLD AUTO: 10.8 % (ref 15–50)
MCH RBC QN AUTO: 34.1 PG (ref 26–34)
MCHC RBC AUTO-ENTMCNC: 32.8 G/DL (ref 31–37)
MCV RBC: 104.2 FL (ref 80–100)
MONOCYTES NFR BLD: 7.1 % (ref 2–11)
MUCOUS THREADS #/AREA URNS LPF: (no result) /LPF
NEUTROPHILS NFR BLD AUTO: 80.9 % (ref 40–80)
NITRITE UR-MCNC: NEGATIVE MG/ML
OSMOLALITY SERPL CALC.SUM OF ELEC: 277 MOSM/KG (ref 275–300)
PH UR STRIP: 5 [PH] (ref 5–6)
PHOSPHATE SERPL-MCNC: 2.9 MG/DL (ref 2.5–4.9)
PLATELET # BLD: 210 10X3/UL (ref 130–400)
PMV BLD AUTO: 11.2 FL (ref 7.4–10.4)
POTASSIUM SERPL-SCNC: 4.7 MMOL/L (ref 3.5–5.1)
PROT SERPL-MCNC: 5.7 G/DL (ref 6.4–8.2)
PROT UR-MCNC: NEGATIVE MG/DL
RBC # BLD AUTO: 2.87 10X6/UL (ref 4.2–6.1)
RBC #/AREA URNS HPF: (no result) /HPF (ref 0–5)
SODIUM SERPL-SCNC: 138 MMOL/L (ref 136–145)
SP GR UR STRIP: 1.01 (ref 1–1.02)
SQUAMOUS #/AREA URNS HPF: (no result) /HPF (ref 0–5)
UROBILINOGEN UR-MCNC: NORMAL MG/DL
WBC # BLD AUTO: 8.6 10X3/UL (ref 4.8–10.8)
WBC #/AREA URNS HPF: (no result) /HPF (ref 0–5)
YEAST #/AREA URNS HPF: (no result) /HPF

## 2017-12-29 VITALS — SYSTOLIC BLOOD PRESSURE: 130 MMHG | DIASTOLIC BLOOD PRESSURE: 66 MMHG

## 2017-12-29 VITALS — DIASTOLIC BLOOD PRESSURE: 62 MMHG | SYSTOLIC BLOOD PRESSURE: 120 MMHG

## 2017-12-29 VITALS — DIASTOLIC BLOOD PRESSURE: 62 MMHG | SYSTOLIC BLOOD PRESSURE: 126 MMHG

## 2017-12-29 VITALS — SYSTOLIC BLOOD PRESSURE: 142 MMHG | DIASTOLIC BLOOD PRESSURE: 70 MMHG

## 2017-12-29 VITALS — SYSTOLIC BLOOD PRESSURE: 157 MMHG | DIASTOLIC BLOOD PRESSURE: 94 MMHG

## 2017-12-29 VITALS — SYSTOLIC BLOOD PRESSURE: 140 MMHG | DIASTOLIC BLOOD PRESSURE: 73 MMHG

## 2017-12-29 LAB
ALBUMIN SERPL-MCNC: 2.4 G/DL (ref 3.4–5)
ALP SERPL-CCNC: 185 U/L (ref 46–116)
ALT SERPL-CCNC: 54 U/L (ref 10–68)
ANION GAP SERPL CALC-SCNC: 7.5 MMOL/L (ref 8–16)
BASOPHILS NFR BLD AUTO: 0.5 % (ref 0–2)
BILIRUB SERPL-MCNC: 1.2 MG/DL (ref 0.2–1.3)
BUN SERPL-MCNC: 22 MG/DL (ref 7–18)
CALCIUM SERPL-MCNC: 7.9 MG/DL (ref 8.5–10.1)
CHLORIDE SERPL-SCNC: 101 MMOL/L (ref 98–107)
CO2 SERPL-SCNC: 33.6 MMOL/L (ref 21–32)
CREAT SERPL-MCNC: 1.1 MG/DL (ref 0.6–1.3)
EOSINOPHIL NFR BLD: 0.8 % (ref 0–7)
ERYTHROCYTE [DISTWIDTH] IN BLOOD BY AUTOMATED COUNT: 16.9 % (ref 11.5–14.5)
GLOBULIN SER-MCNC: 3.8 G/L
GLUCOSE SERPL-MCNC: 87 MG/DL (ref 74–106)
HCT VFR BLD CALC: 28.5 % (ref 42–54)
HGB BLD-MCNC: 9.3 G/DL (ref 13.5–17.5)
IMM GRANULOCYTES NFR BLD: 0.2 % (ref 0–5)
LYMPHOCYTES NFR BLD AUTO: 12 % (ref 15–50)
MCH RBC QN AUTO: 33.7 PG (ref 26–34)
MCHC RBC AUTO-ENTMCNC: 32.6 G/DL (ref 31–37)
MCV RBC: 103.3 FL (ref 80–100)
MONOCYTES NFR BLD: 7 % (ref 2–11)
NEUTROPHILS NFR BLD AUTO: 79.5 % (ref 40–80)
OSMOLALITY SERPL CALC.SUM OF ELEC: 277 MOSM/KG (ref 275–300)
PLATELET # BLD: 192 10X3/UL (ref 130–400)
PMV BLD AUTO: 10.9 FL (ref 7.4–10.4)
POTASSIUM SERPL-SCNC: 4.1 MMOL/L (ref 3.5–5.1)
PROT SERPL-MCNC: 6.2 G/DL (ref 6.4–8.2)
RBC # BLD AUTO: 2.76 10X6/UL (ref 4.2–6.1)
SODIUM SERPL-SCNC: 138 MMOL/L (ref 136–145)
WBC # BLD AUTO: 8.3 10X3/UL (ref 4.8–10.8)

## 2017-12-30 VITALS — DIASTOLIC BLOOD PRESSURE: 75 MMHG | SYSTOLIC BLOOD PRESSURE: 140 MMHG

## 2017-12-30 VITALS — SYSTOLIC BLOOD PRESSURE: 143 MMHG | DIASTOLIC BLOOD PRESSURE: 72 MMHG

## 2017-12-30 VITALS — SYSTOLIC BLOOD PRESSURE: 144 MMHG | DIASTOLIC BLOOD PRESSURE: 72 MMHG

## 2017-12-30 VITALS — SYSTOLIC BLOOD PRESSURE: 139 MMHG | DIASTOLIC BLOOD PRESSURE: 69 MMHG

## 2017-12-30 VITALS — SYSTOLIC BLOOD PRESSURE: 141 MMHG | DIASTOLIC BLOOD PRESSURE: 72 MMHG

## 2017-12-30 VITALS — DIASTOLIC BLOOD PRESSURE: 82 MMHG | SYSTOLIC BLOOD PRESSURE: 142 MMHG

## 2017-12-30 LAB
ALBUMIN SERPL-MCNC: 2.3 G/DL (ref 3.4–5)
ALP SERPL-CCNC: 248 U/L (ref 46–116)
ALT SERPL-CCNC: 80 U/L (ref 10–68)
ANION GAP SERPL CALC-SCNC: 9.8 MMOL/L (ref 8–16)
BILIRUB SERPL-MCNC: 1.31 MG/DL (ref 0.2–1.3)
BUN SERPL-MCNC: 24 MG/DL (ref 7–18)
CALCIUM SERPL-MCNC: 7.9 MG/DL (ref 8.5–10.1)
CHLORIDE SERPL-SCNC: 101 MMOL/L (ref 98–107)
CO2 SERPL-SCNC: 30.4 MMOL/L (ref 21–32)
CREAT SERPL-MCNC: 0.9 MG/DL (ref 0.6–1.3)
GLOBULIN SER-MCNC: 3.9 G/L
GLUCOSE SERPL-MCNC: 111 MG/DL (ref 74–106)
OSMOLALITY SERPL CALC.SUM OF ELEC: 276 MOSM/KG (ref 275–300)
POTASSIUM SERPL-SCNC: 5.2 MMOL/L (ref 3.5–5.1)
PROT SERPL-MCNC: 6.2 G/DL (ref 6.4–8.2)
SODIUM SERPL-SCNC: 136 MMOL/L (ref 136–145)

## 2017-12-31 VITALS — DIASTOLIC BLOOD PRESSURE: 70 MMHG | SYSTOLIC BLOOD PRESSURE: 138 MMHG

## 2017-12-31 VITALS — DIASTOLIC BLOOD PRESSURE: 89 MMHG | SYSTOLIC BLOOD PRESSURE: 135 MMHG

## 2017-12-31 VITALS — DIASTOLIC BLOOD PRESSURE: 82 MMHG | SYSTOLIC BLOOD PRESSURE: 155 MMHG

## 2017-12-31 VITALS — DIASTOLIC BLOOD PRESSURE: 68 MMHG | SYSTOLIC BLOOD PRESSURE: 126 MMHG

## 2017-12-31 LAB
ANION GAP SERPL CALC-SCNC: 8.9 MMOL/L (ref 8–16)
BUN SERPL-MCNC: 22 MG/DL (ref 7–18)
CALCIUM SERPL-MCNC: 7.7 MG/DL (ref 8.5–10.1)
CHLORIDE SERPL-SCNC: 101 MMOL/L (ref 98–107)
CO2 SERPL-SCNC: 33.7 MMOL/L (ref 21–32)
CREAT SERPL-MCNC: 1.1 MG/DL (ref 0.6–1.3)
GLUCOSE SERPL-MCNC: 80 MG/DL (ref 74–106)
OSMOLALITY SERPL CALC.SUM OF ELEC: 280 MOSM/KG (ref 275–300)
POTASSIUM SERPL-SCNC: 3.6 MMOL/L (ref 3.5–5.1)
SODIUM SERPL-SCNC: 140 MMOL/L (ref 136–145)

## 2018-01-01 VITALS — DIASTOLIC BLOOD PRESSURE: 72 MMHG | SYSTOLIC BLOOD PRESSURE: 136 MMHG

## 2018-01-01 VITALS — SYSTOLIC BLOOD PRESSURE: 151 MMHG | DIASTOLIC BLOOD PRESSURE: 87 MMHG

## 2018-01-01 VITALS — DIASTOLIC BLOOD PRESSURE: 77 MMHG | SYSTOLIC BLOOD PRESSURE: 128 MMHG

## 2018-01-01 VITALS — DIASTOLIC BLOOD PRESSURE: 74 MMHG | SYSTOLIC BLOOD PRESSURE: 155 MMHG

## 2018-01-01 VITALS — DIASTOLIC BLOOD PRESSURE: 78 MMHG | SYSTOLIC BLOOD PRESSURE: 131 MMHG

## 2018-01-01 VITALS — DIASTOLIC BLOOD PRESSURE: 74 MMHG | SYSTOLIC BLOOD PRESSURE: 142 MMHG

## 2018-01-01 LAB
ALBUMIN SERPL-MCNC: 2.4 G/DL (ref 3.4–5)
ALP SERPL-CCNC: 198 U/L (ref 46–116)
ALT SERPL-CCNC: 56 U/L (ref 10–68)
ANION GAP SERPL CALC-SCNC: 8.3 MMOL/L (ref 8–16)
BASOPHILS NFR BLD AUTO: 0.7 % (ref 0–2)
BILIRUB SERPL-MCNC: 1.15 MG/DL (ref 0.2–1.3)
BUN SERPL-MCNC: 19 MG/DL (ref 7–18)
CALCIUM SERPL-MCNC: 7.8 MG/DL (ref 8.5–10.1)
CHLORIDE SERPL-SCNC: 100 MMOL/L (ref 98–107)
CO2 SERPL-SCNC: 34.4 MMOL/L (ref 21–32)
CREAT SERPL-MCNC: 1 MG/DL (ref 0.6–1.3)
EOSINOPHIL NFR BLD: 1.9 % (ref 0–7)
ERYTHROCYTE [DISTWIDTH] IN BLOOD BY AUTOMATED COUNT: 16.2 % (ref 11.5–14.5)
GLOBULIN SER-MCNC: 3.9 G/L
GLUCOSE SERPL-MCNC: 79 MG/DL (ref 74–106)
HCT VFR BLD CALC: 28.1 % (ref 42–54)
HGB BLD-MCNC: 9.3 G/DL (ref 13.5–17.5)
IMM GRANULOCYTES NFR BLD: 0.2 % (ref 0–5)
LYMPHOCYTES NFR BLD AUTO: 18.5 % (ref 15–50)
MAGNESIUM SERPL-MCNC: 1.1 MG/DL (ref 1.8–2.4)
MCH RBC QN AUTO: 33.8 PG (ref 26–34)
MCHC RBC AUTO-ENTMCNC: 33.1 G/DL (ref 31–37)
MCV RBC: 102.2 FL (ref 80–100)
MONOCYTES NFR BLD: 10.8 % (ref 2–11)
NEUTROPHILS NFR BLD AUTO: 67.9 % (ref 40–80)
OSMOLALITY SERPL CALC.SUM OF ELEC: 278 MOSM/KG (ref 275–300)
PHOSPHATE SERPL-MCNC: 3.5 MG/DL (ref 2.5–4.9)
PLATELET # BLD: 179 10X3/UL (ref 130–400)
PMV BLD AUTO: 10.4 FL (ref 7.4–10.4)
POTASSIUM SERPL-SCNC: 3.7 MMOL/L (ref 3.5–5.1)
PROT SERPL-MCNC: 6.3 G/DL (ref 6.4–8.2)
RBC # BLD AUTO: 2.75 10X6/UL (ref 4.2–6.1)
SODIUM SERPL-SCNC: 139 MMOL/L (ref 136–145)
WBC # BLD AUTO: 5.7 10X3/UL (ref 4.8–10.8)

## 2018-01-02 VITALS — DIASTOLIC BLOOD PRESSURE: 77 MMHG | SYSTOLIC BLOOD PRESSURE: 161 MMHG

## 2018-01-02 VITALS — DIASTOLIC BLOOD PRESSURE: 80 MMHG | SYSTOLIC BLOOD PRESSURE: 139 MMHG

## 2018-01-02 VITALS — DIASTOLIC BLOOD PRESSURE: 79 MMHG | SYSTOLIC BLOOD PRESSURE: 134 MMHG

## 2018-01-02 VITALS — SYSTOLIC BLOOD PRESSURE: 127 MMHG | DIASTOLIC BLOOD PRESSURE: 70 MMHG

## 2018-01-02 LAB
ALBUMIN SERPL-MCNC: 2.4 G/DL (ref 3.4–5)
ALP SERPL-CCNC: 193 U/L (ref 46–116)
ALT SERPL-CCNC: 46 U/L (ref 10–68)
ANION GAP SERPL CALC-SCNC: 9 MMOL/L (ref 8–16)
BASOPHILS NFR BLD AUTO: 0.7 % (ref 0–2)
BILIRUB SERPL-MCNC: 1.13 MG/DL (ref 0.2–1.3)
BUN SERPL-MCNC: 18 MG/DL (ref 7–18)
CALCIUM SERPL-MCNC: 8 MG/DL (ref 8.5–10.1)
CHLORIDE SERPL-SCNC: 99 MMOL/L (ref 98–107)
CO2 SERPL-SCNC: 34.7 MMOL/L (ref 21–32)
CREAT SERPL-MCNC: 1.1 MG/DL (ref 0.6–1.3)
EOSINOPHIL NFR BLD: 1.6 % (ref 0–7)
ERYTHROCYTE [DISTWIDTH] IN BLOOD BY AUTOMATED COUNT: 16.2 % (ref 11.5–14.5)
GLOBULIN SER-MCNC: 3.9 G/L
GLUCOSE SERPL-MCNC: 85 MG/DL (ref 74–106)
HCT VFR BLD CALC: 26.9 % (ref 42–54)
HGB BLD-MCNC: 9 G/DL (ref 13.5–17.5)
IMM GRANULOCYTES NFR BLD: 0.2 % (ref 0–5)
LYMPHOCYTES NFR BLD AUTO: 17.8 % (ref 15–50)
MAGNESIUM SERPL-MCNC: 1.2 MG/DL (ref 1.8–2.4)
MCH RBC QN AUTO: 33.8 PG (ref 26–34)
MCHC RBC AUTO-ENTMCNC: 33.5 G/DL (ref 31–37)
MCV RBC: 101.1 FL (ref 80–100)
MONOCYTES NFR BLD: 9.8 % (ref 2–11)
NEUTROPHILS NFR BLD AUTO: 69.9 % (ref 40–80)
OSMOLALITY SERPL CALC.SUM OF ELEC: 278 MOSM/KG (ref 275–300)
PHOSPHATE SERPL-MCNC: 3.7 MG/DL (ref 2.5–4.9)
PLATELET # BLD: 175 10X3/UL (ref 130–400)
PMV BLD AUTO: 10.3 FL (ref 7.4–10.4)
POTASSIUM SERPL-SCNC: 3.7 MMOL/L (ref 3.5–5.1)
PROT SERPL-MCNC: 6.3 G/DL (ref 6.4–8.2)
RBC # BLD AUTO: 2.66 10X6/UL (ref 4.2–6.1)
SODIUM SERPL-SCNC: 139 MMOL/L (ref 136–145)
WBC # BLD AUTO: 5.5 10X3/UL (ref 4.8–10.8)

## 2018-02-01 ENCOUNTER — HOSPITAL ENCOUNTER (EMERGENCY)
Dept: HOSPITAL 84 - D.ER | Age: 63
LOS: 1 days | Discharge: LEFT BEFORE DISCHARGE | End: 2018-02-02
Payer: MEDICAID

## 2018-02-01 VITALS — BODY MASS INDEX: 22.9 KG/M2

## 2018-02-01 DIAGNOSIS — F17.200: ICD-10-CM

## 2018-02-01 DIAGNOSIS — I48.91: ICD-10-CM

## 2018-02-01 DIAGNOSIS — F10.10: ICD-10-CM

## 2018-02-01 DIAGNOSIS — R07.9: Primary | ICD-10-CM

## 2018-02-08 ENCOUNTER — HOSPITAL ENCOUNTER (INPATIENT)
Dept: HOSPITAL 84 - D.ER | Age: 63
LOS: 7 days | Discharge: HOME | DRG: 286 | End: 2018-02-15
Admitting: FAMILY MEDICINE
Payer: MEDICAID

## 2018-02-08 DIAGNOSIS — J44.1: ICD-10-CM

## 2018-02-08 DIAGNOSIS — K86.0: ICD-10-CM

## 2018-02-08 DIAGNOSIS — E83.42: ICD-10-CM

## 2018-02-08 DIAGNOSIS — I50.30: ICD-10-CM

## 2018-02-08 DIAGNOSIS — K70.10: ICD-10-CM

## 2018-02-08 DIAGNOSIS — N17.9: ICD-10-CM

## 2018-02-08 DIAGNOSIS — K70.40: ICD-10-CM

## 2018-02-08 DIAGNOSIS — J18.9: ICD-10-CM

## 2018-02-08 DIAGNOSIS — J98.11: ICD-10-CM

## 2018-02-08 DIAGNOSIS — F10.10: ICD-10-CM

## 2018-02-08 DIAGNOSIS — I11.0: Primary | ICD-10-CM

## 2018-02-08 DIAGNOSIS — J44.0: ICD-10-CM

## 2018-02-08 DIAGNOSIS — E03.9: ICD-10-CM

## 2018-02-08 DIAGNOSIS — E87.6: ICD-10-CM

## 2018-02-08 DIAGNOSIS — E83.51: ICD-10-CM

## 2018-02-08 DIAGNOSIS — J96.01: ICD-10-CM

## 2018-02-08 DIAGNOSIS — D50.9: ICD-10-CM

## 2018-02-12 PROCEDURE — B215YZZ FLUOROSCOPY OF LEFT HEART USING OTHER CONTRAST: ICD-10-PCS | Performed by: INTERNAL MEDICINE

## 2018-02-12 PROCEDURE — 4A023N7 MEASUREMENT OF CARDIAC SAMPLING AND PRESSURE, LEFT HEART, PERCUTANEOUS APPROACH: ICD-10-PCS | Performed by: INTERNAL MEDICINE

## 2018-02-12 PROCEDURE — B211YZZ FLUOROSCOPY OF MULTIPLE CORONARY ARTERIES USING OTHER CONTRAST: ICD-10-PCS | Performed by: INTERNAL MEDICINE

## 2018-03-02 ENCOUNTER — HOSPITAL ENCOUNTER (INPATIENT)
Dept: HOSPITAL 84 - D.CVICU | Age: 63
LOS: 3 days | Discharge: HOME | DRG: 242 | End: 2018-03-05
Attending: INTERNAL MEDICINE | Admitting: INTERNAL MEDICINE
Payer: MEDICAID

## 2018-03-02 VITALS — DIASTOLIC BLOOD PRESSURE: 86 MMHG | SYSTOLIC BLOOD PRESSURE: 137 MMHG

## 2018-03-02 VITALS
WEIGHT: 156 LBS | HEIGHT: 71 IN | BODY MASS INDEX: 21.84 KG/M2 | HEIGHT: 71 IN | BODY MASS INDEX: 21.84 KG/M2 | WEIGHT: 156 LBS

## 2018-03-02 VITALS — DIASTOLIC BLOOD PRESSURE: 88 MMHG | SYSTOLIC BLOOD PRESSURE: 165 MMHG

## 2018-03-02 VITALS — SYSTOLIC BLOOD PRESSURE: 153 MMHG | DIASTOLIC BLOOD PRESSURE: 78 MMHG

## 2018-03-02 VITALS — DIASTOLIC BLOOD PRESSURE: 77 MMHG | SYSTOLIC BLOOD PRESSURE: 155 MMHG

## 2018-03-02 VITALS — DIASTOLIC BLOOD PRESSURE: 77 MMHG | SYSTOLIC BLOOD PRESSURE: 170 MMHG

## 2018-03-02 DIAGNOSIS — I44.2: Primary | ICD-10-CM

## 2018-03-02 DIAGNOSIS — J44.9: ICD-10-CM

## 2018-03-02 DIAGNOSIS — I10: ICD-10-CM

## 2018-03-02 DIAGNOSIS — G93.40: ICD-10-CM

## 2018-03-02 DIAGNOSIS — N17.9: ICD-10-CM

## 2018-03-02 DIAGNOSIS — F10.231: ICD-10-CM

## 2018-03-02 DIAGNOSIS — I46.9: ICD-10-CM

## 2018-03-02 DIAGNOSIS — E87.5: ICD-10-CM

## 2018-03-02 LAB
ALBUMIN SERPL-MCNC: 3.8 G/DL (ref 3.4–5)
ALP SERPL-CCNC: 84 U/L (ref 46–116)
ALT SERPL-CCNC: 144 U/L (ref 10–68)
ANION GAP SERPL CALC-SCNC: 31 MMOL/L (ref 8–16)
APPEARANCE UR: CLEAR
BASOPHILS NFR BLD AUTO: 0.1 % (ref 0–2)
BILIRUB SERPL-MCNC: 4 MG/DL (ref 0.2–1.3)
BILIRUB SERPL-MCNC: NEGATIVE MG/DL
BUN SERPL-MCNC: 36 MG/DL (ref 7–18)
CALCIUM SERPL-MCNC: 7.7 MG/DL (ref 8.5–10.1)
CHLORIDE SERPL-SCNC: 103 MMOL/L (ref 98–107)
CHOLEST/HDLC SERPL: 2.3 RATIO (ref 2.3–4.9)
CK MB SERPL-MCNC: 2.4 U/L (ref 0–3.6)
CK SERPL-CCNC: 84 UL (ref 21–232)
CO2 SERPL-SCNC: 15.7 MMOL/L (ref 21–32)
COLOR UR: (no result)
CREAT SERPL-MCNC: 2.4 MG/DL (ref 0.6–1.3)
EOSINOPHIL NFR BLD: 0 % (ref 0–7)
ERYTHROCYTE [DISTWIDTH] IN BLOOD BY AUTOMATED COUNT: 19.8 % (ref 11.5–14.5)
ETHANOL SERPL-MCNC: 0 MG/DL (ref 0–10)
GLOBULIN SER-MCNC: 5 G/L
GLUCOSE SERPL-MCNC: 160 MG/DL (ref 74–106)
GLUCOSE SERPL-MCNC: NEGATIVE MG/DL
HCT VFR BLD CALC: 32 % (ref 42–54)
HDLC SERPL-MCNC: 86 MG/DL (ref 32–96)
HGB BLD-MCNC: 10.6 G/DL (ref 13.5–17.5)
IMM GRANULOCYTES NFR BLD: 0.7 % (ref 0–5)
KETONES UR STRIP-MCNC: NEGATIVE MG/DL
LDL-HDL RATIO: 1.1 RATIO (ref 1.5–3.5)
LDLC SERPL-MCNC: 93 MG/DL (ref 0–100)
LYMPHOCYTES NFR BLD AUTO: 5.1 % (ref 15–50)
MCH RBC QN AUTO: 31.5 PG (ref 26–34)
MCHC RBC AUTO-ENTMCNC: 33.1 G/DL (ref 31–37)
MCV RBC: 95.2 FL (ref 80–100)
MONOCYTES NFR BLD: 8.1 % (ref 2–11)
NEUTROPHILS NFR BLD AUTO: 86 % (ref 40–80)
NITRITE UR-MCNC: NEGATIVE MG/ML
NT-PROBNP SERPL-MCNC: (no result) PG/ML (ref 0–125)
OSMOLALITY SERPL CALC.SUM OF ELEC: 299 MOSM/KG (ref 275–300)
PH UR STRIP: 5 [PH] (ref 5–6)
PLATELET # BLD: 170 10X3/UL (ref 130–400)
PMV BLD AUTO: 10.1 FL (ref 7.4–10.4)
POTASSIUM SERPL-SCNC: 4.7 MMOL/L (ref 3.5–5.1)
PROT SERPL-MCNC: 8.8 G/DL (ref 6.4–8.2)
PROT UR-MCNC: (no result) MG/DL
RBC # BLD AUTO: 3.36 10X6/UL (ref 4.2–6.1)
SODIUM SERPL-SCNC: 145 MMOL/L (ref 136–145)
SP GR UR STRIP: 1.03 (ref 1–1.02)
TRIGL SERPL-MCNC: 100 MG/DL (ref 30–200)
TROPONIN I SERPL-MCNC: 0.16 NG/ML (ref 0–0.06)
TSH SERPL-ACNC: 7.32 UIU/ML (ref 0.36–3.74)
UROBILINOGEN UR-MCNC: NORMAL MG/DL
WBC # BLD AUTO: 12.9 10X3/UL (ref 4.8–10.8)

## 2018-03-02 PROCEDURE — 5A1223Z PERFORMANCE OF CARDIAC PACING, CONTINUOUS: ICD-10-PCS | Performed by: INTERNAL MEDICINE

## 2018-03-03 VITALS — DIASTOLIC BLOOD PRESSURE: 80 MMHG | SYSTOLIC BLOOD PRESSURE: 152 MMHG

## 2018-03-03 VITALS — DIASTOLIC BLOOD PRESSURE: 74 MMHG | SYSTOLIC BLOOD PRESSURE: 143 MMHG

## 2018-03-03 VITALS — DIASTOLIC BLOOD PRESSURE: 68 MMHG | SYSTOLIC BLOOD PRESSURE: 124 MMHG

## 2018-03-03 VITALS — SYSTOLIC BLOOD PRESSURE: 154 MMHG | DIASTOLIC BLOOD PRESSURE: 80 MMHG

## 2018-03-03 VITALS — DIASTOLIC BLOOD PRESSURE: 78 MMHG | SYSTOLIC BLOOD PRESSURE: 155 MMHG

## 2018-03-03 VITALS — DIASTOLIC BLOOD PRESSURE: 80 MMHG | SYSTOLIC BLOOD PRESSURE: 143 MMHG

## 2018-03-03 VITALS — SYSTOLIC BLOOD PRESSURE: 126 MMHG | DIASTOLIC BLOOD PRESSURE: 71 MMHG

## 2018-03-03 VITALS — SYSTOLIC BLOOD PRESSURE: 171 MMHG | DIASTOLIC BLOOD PRESSURE: 88 MMHG

## 2018-03-03 VITALS — SYSTOLIC BLOOD PRESSURE: 154 MMHG | DIASTOLIC BLOOD PRESSURE: 78 MMHG

## 2018-03-03 VITALS — DIASTOLIC BLOOD PRESSURE: 86 MMHG | SYSTOLIC BLOOD PRESSURE: 110 MMHG

## 2018-03-03 VITALS — DIASTOLIC BLOOD PRESSURE: 79 MMHG | SYSTOLIC BLOOD PRESSURE: 145 MMHG

## 2018-03-03 VITALS — DIASTOLIC BLOOD PRESSURE: 70 MMHG | SYSTOLIC BLOOD PRESSURE: 135 MMHG

## 2018-03-03 VITALS — SYSTOLIC BLOOD PRESSURE: 132 MMHG | DIASTOLIC BLOOD PRESSURE: 78 MMHG

## 2018-03-03 VITALS — DIASTOLIC BLOOD PRESSURE: 68 MMHG | SYSTOLIC BLOOD PRESSURE: 126 MMHG

## 2018-03-03 VITALS — SYSTOLIC BLOOD PRESSURE: 110 MMHG | DIASTOLIC BLOOD PRESSURE: 67 MMHG

## 2018-03-03 VITALS — SYSTOLIC BLOOD PRESSURE: 135 MMHG | DIASTOLIC BLOOD PRESSURE: 70 MMHG

## 2018-03-03 VITALS — SYSTOLIC BLOOD PRESSURE: 143 MMHG | DIASTOLIC BLOOD PRESSURE: 74 MMHG

## 2018-03-03 VITALS — SYSTOLIC BLOOD PRESSURE: 152 MMHG | DIASTOLIC BLOOD PRESSURE: 80 MMHG

## 2018-03-03 VITALS — DIASTOLIC BLOOD PRESSURE: 74 MMHG | SYSTOLIC BLOOD PRESSURE: 131 MMHG

## 2018-03-03 VITALS — DIASTOLIC BLOOD PRESSURE: 83 MMHG | SYSTOLIC BLOOD PRESSURE: 113 MMHG

## 2018-03-03 LAB
ALBUMIN SERPL-MCNC: 3.1 G/DL (ref 3.4–5)
ALP SERPL-CCNC: 79 U/L (ref 46–116)
ALT SERPL-CCNC: 148 U/L (ref 10–68)
ANION GAP SERPL CALC-SCNC: 17.8 MMOL/L (ref 8–16)
BASOPHILS NFR BLD AUTO: 0.1 % (ref 0–2)
BILIRUB SERPL-MCNC: 1.89 MG/DL (ref 0.2–1.3)
BUN SERPL-MCNC: 45 MG/DL (ref 7–18)
CALCIUM SERPL-MCNC: 7.8 MG/DL (ref 8.5–10.1)
CHLORIDE SERPL-SCNC: 105 MMOL/L (ref 98–107)
CO2 SERPL-SCNC: 27.1 MMOL/L (ref 21–32)
CREAT SERPL-MCNC: 1.9 MG/DL (ref 0.6–1.3)
EOSINOPHIL NFR BLD: 0 % (ref 0–7)
ERYTHROCYTE [DISTWIDTH] IN BLOOD BY AUTOMATED COUNT: 19.9 % (ref 11.5–14.5)
GLOBULIN SER-MCNC: 5.3 G/L
GLUCOSE SERPL-MCNC: 126 MG/DL (ref 74–106)
HCT VFR BLD CALC: 39.9 % (ref 42–54)
HGB BLD-MCNC: 12.8 G/DL (ref 13.5–17.5)
IMM GRANULOCYTES NFR BLD: 0.4 % (ref 0–5)
INR PPP: 1.49 (ref 0.85–1.17)
LYMPHOCYTES NFR BLD AUTO: 7 % (ref 15–50)
MCH RBC QN AUTO: 31.2 PG (ref 26–34)
MCHC RBC AUTO-ENTMCNC: 32.1 G/DL (ref 31–37)
MCV RBC: 97.3 FL (ref 80–100)
MONOCYTES NFR BLD: 5.5 % (ref 2–11)
NEUTROPHILS NFR BLD AUTO: 87 % (ref 40–80)
OSMOLALITY SERPL CALC.SUM OF ELEC: 300 MOSM/KG (ref 275–300)
PLATELET # BLD: 182 10X3/UL (ref 130–400)
PMV BLD AUTO: 11.5 FL (ref 7.4–10.4)
POTASSIUM SERPL-SCNC: 5.9 MMOL/L (ref 3.5–5.1)
PROT SERPL-MCNC: 8.4 G/DL (ref 6.4–8.2)
PROTHROMBIN TIME: 17.5 SECONDS (ref 11.6–15)
RBC # BLD AUTO: 4.1 10X6/UL (ref 4.2–6.1)
SODIUM SERPL-SCNC: 144 MMOL/L (ref 136–145)
WBC # BLD AUTO: 15.3 10X3/UL (ref 4.8–10.8)

## 2018-03-03 PROCEDURE — 02H63JZ INSERTION OF PACEMAKER LEAD INTO RIGHT ATRIUM, PERCUTANEOUS APPROACH: ICD-10-PCS | Performed by: INTERNAL MEDICINE

## 2018-03-03 PROCEDURE — 02HK3JZ INSERTION OF PACEMAKER LEAD INTO RIGHT VENTRICLE, PERCUTANEOUS APPROACH: ICD-10-PCS | Performed by: INTERNAL MEDICINE

## 2018-03-03 PROCEDURE — 0JH606Z INSERTION OF PACEMAKER, DUAL CHAMBER INTO CHEST SUBCUTANEOUS TISSUE AND FASCIA, OPEN APPROACH: ICD-10-PCS | Performed by: SURGERY

## 2018-03-04 VITALS — SYSTOLIC BLOOD PRESSURE: 102 MMHG | DIASTOLIC BLOOD PRESSURE: 44 MMHG

## 2018-03-04 VITALS — DIASTOLIC BLOOD PRESSURE: 65 MMHG | SYSTOLIC BLOOD PRESSURE: 138 MMHG

## 2018-03-04 VITALS — DIASTOLIC BLOOD PRESSURE: 60 MMHG | SYSTOLIC BLOOD PRESSURE: 132 MMHG

## 2018-03-04 VITALS — SYSTOLIC BLOOD PRESSURE: 106 MMHG | DIASTOLIC BLOOD PRESSURE: 54 MMHG

## 2018-03-04 VITALS — SYSTOLIC BLOOD PRESSURE: 134 MMHG | DIASTOLIC BLOOD PRESSURE: 67 MMHG

## 2018-03-04 VITALS — DIASTOLIC BLOOD PRESSURE: 61 MMHG | SYSTOLIC BLOOD PRESSURE: 123 MMHG

## 2018-03-04 VITALS — DIASTOLIC BLOOD PRESSURE: 70 MMHG | SYSTOLIC BLOOD PRESSURE: 132 MMHG

## 2018-03-04 VITALS — DIASTOLIC BLOOD PRESSURE: 64 MMHG | SYSTOLIC BLOOD PRESSURE: 124 MMHG

## 2018-03-04 VITALS — DIASTOLIC BLOOD PRESSURE: 67 MMHG | SYSTOLIC BLOOD PRESSURE: 130 MMHG

## 2018-03-04 VITALS — DIASTOLIC BLOOD PRESSURE: 66 MMHG | SYSTOLIC BLOOD PRESSURE: 129 MMHG

## 2018-03-04 VITALS — DIASTOLIC BLOOD PRESSURE: 64 MMHG | SYSTOLIC BLOOD PRESSURE: 133 MMHG

## 2018-03-04 VITALS — DIASTOLIC BLOOD PRESSURE: 49 MMHG | SYSTOLIC BLOOD PRESSURE: 105 MMHG

## 2018-03-04 VITALS — SYSTOLIC BLOOD PRESSURE: 124 MMHG | DIASTOLIC BLOOD PRESSURE: 62 MMHG

## 2018-03-04 VITALS — SYSTOLIC BLOOD PRESSURE: 147 MMHG | DIASTOLIC BLOOD PRESSURE: 75 MMHG

## 2018-03-04 VITALS — DIASTOLIC BLOOD PRESSURE: 49 MMHG | SYSTOLIC BLOOD PRESSURE: 100 MMHG

## 2018-03-04 LAB
ALBUMIN SERPL-MCNC: 3 G/DL (ref 3.4–5)
ALP SERPL-CCNC: 66 U/L (ref 46–116)
ALT SERPL-CCNC: 200 U/L (ref 10–68)
ANION GAP SERPL CALC-SCNC: 16.4 MMOL/L (ref 8–16)
BASOPHILS NFR BLD AUTO: 0.1 % (ref 0–2)
BILIRUB SERPL-MCNC: 1.3 MG/DL (ref 0.2–1.3)
BUN SERPL-MCNC: 37 MG/DL (ref 7–18)
CALCIUM SERPL-MCNC: 6.7 MG/DL (ref 8.5–10.1)
CHLORIDE SERPL-SCNC: 107 MMOL/L (ref 98–107)
CO2 SERPL-SCNC: 28.2 MMOL/L (ref 21–32)
CREAT SERPL-MCNC: 1.2 MG/DL (ref 0.6–1.3)
EOSINOPHIL NFR BLD: 0 % (ref 0–7)
ERYTHROCYTE [DISTWIDTH] IN BLOOD BY AUTOMATED COUNT: 19.8 % (ref 11.5–14.5)
GLOBULIN SER-MCNC: 4.1 G/L
GLUCOSE SERPL-MCNC: 105 MG/DL (ref 74–106)
HCT VFR BLD CALC: 31.9 % (ref 42–54)
HGB BLD-MCNC: 10.2 G/DL (ref 13.5–17.5)
IMM GRANULOCYTES NFR BLD: 0.2 % (ref 0–5)
LYMPHOCYTES NFR BLD AUTO: 5.6 % (ref 15–50)
MCH RBC QN AUTO: 31.1 PG (ref 26–34)
MCHC RBC AUTO-ENTMCNC: 32 G/DL (ref 31–37)
MCV RBC: 97.3 FL (ref 80–100)
MONOCYTES NFR BLD: 4 % (ref 2–11)
NEUTROPHILS NFR BLD AUTO: 90.1 % (ref 40–80)
OSMOLALITY SERPL CALC.SUM OF ELEC: 302 MOSM/KG (ref 275–300)
PLATELET # BLD: 143 10X3/UL (ref 130–400)
PMV BLD AUTO: 10.9 FL (ref 7.4–10.4)
POTASSIUM SERPL-SCNC: 3.6 MMOL/L (ref 3.5–5.1)
PROT SERPL-MCNC: 7.1 G/DL (ref 6.4–8.2)
RBC # BLD AUTO: 3.28 10X6/UL (ref 4.2–6.1)
SODIUM SERPL-SCNC: 148 MMOL/L (ref 136–145)
WBC # BLD AUTO: 8.7 10X3/UL (ref 4.8–10.8)

## 2018-03-05 VITALS — DIASTOLIC BLOOD PRESSURE: 62 MMHG | SYSTOLIC BLOOD PRESSURE: 131 MMHG

## 2018-03-05 VITALS — SYSTOLIC BLOOD PRESSURE: 135 MMHG | DIASTOLIC BLOOD PRESSURE: 60 MMHG

## 2018-03-05 VITALS — DIASTOLIC BLOOD PRESSURE: 66 MMHG | SYSTOLIC BLOOD PRESSURE: 141 MMHG

## 2018-03-05 VITALS — DIASTOLIC BLOOD PRESSURE: 42 MMHG | SYSTOLIC BLOOD PRESSURE: 117 MMHG

## 2018-03-05 VITALS — SYSTOLIC BLOOD PRESSURE: 108 MMHG | DIASTOLIC BLOOD PRESSURE: 56 MMHG

## 2018-03-05 VITALS — SYSTOLIC BLOOD PRESSURE: 109 MMHG | DIASTOLIC BLOOD PRESSURE: 66 MMHG

## 2018-03-05 LAB
ALBUMIN SERPL-MCNC: 2.4 G/DL (ref 3.4–5)
ALP SERPL-CCNC: 62 U/L (ref 46–116)
ALT SERPL-CCNC: 146 U/L (ref 10–68)
ANION GAP SERPL CALC-SCNC: 11.9 MMOL/L (ref 8–16)
BASOPHILS NFR BLD AUTO: 0.3 % (ref 0–2)
BILIRUB SERPL-MCNC: 0.9 MG/DL (ref 0.2–1.3)
BUN SERPL-MCNC: 25 MG/DL (ref 7–18)
CALCIUM SERPL-MCNC: 6 MG/DL (ref 8.5–10.1)
CHLORIDE SERPL-SCNC: 107 MMOL/L (ref 98–107)
CO2 SERPL-SCNC: 28.2 MMOL/L (ref 21–32)
CREAT SERPL-MCNC: 0.9 MG/DL (ref 0.6–1.3)
EOSINOPHIL NFR BLD: 0.5 % (ref 0–7)
ERYTHROCYTE [DISTWIDTH] IN BLOOD BY AUTOMATED COUNT: 19.9 % (ref 11.5–14.5)
GLOBULIN SER-MCNC: 4 G/L
GLUCOSE SERPL-MCNC: 98 MG/DL (ref 74–106)
HCT VFR BLD CALC: 29.4 % (ref 42–54)
HGB BLD-MCNC: 9.3 G/DL (ref 13.5–17.5)
IMM GRANULOCYTES NFR BLD: 0.7 % (ref 0–5)
LYMPHOCYTES NFR BLD AUTO: 10.2 % (ref 15–50)
MCH RBC QN AUTO: 30.5 PG (ref 26–34)
MCHC RBC AUTO-ENTMCNC: 31.6 G/DL (ref 31–37)
MCV RBC: 96.4 FL (ref 80–100)
MONOCYTES NFR BLD: 5.9 % (ref 2–11)
NEUTROPHILS NFR BLD AUTO: 82.4 % (ref 40–80)
OSMOLALITY SERPL CALC.SUM OF ELEC: 290 MOSM/KG (ref 275–300)
PLATELET # BLD: 171 10X3/UL (ref 130–400)
PMV BLD AUTO: 10.8 FL (ref 7.4–10.4)
POTASSIUM SERPL-SCNC: 3.1 MMOL/L (ref 3.5–5.1)
PROT SERPL-MCNC: 6.4 G/DL (ref 6.4–8.2)
RBC # BLD AUTO: 3.05 10X6/UL (ref 4.2–6.1)
SODIUM SERPL-SCNC: 144 MMOL/L (ref 136–145)
WBC # BLD AUTO: 7.4 10X3/UL (ref 4.8–10.8)

## 2018-03-07 ENCOUNTER — HOSPITAL ENCOUNTER (INPATIENT)
Dept: HOSPITAL 84 - D.ER | Age: 63
LOS: 8 days | Discharge: HOME | DRG: 190 | End: 2018-03-15
Attending: FAMILY MEDICINE | Admitting: FAMILY MEDICINE
Payer: MEDICAID

## 2018-03-07 VITALS
BODY MASS INDEX: 20.86 KG/M2 | HEIGHT: 71 IN | BODY MASS INDEX: 20.86 KG/M2 | WEIGHT: 149.01 LBS | HEIGHT: 71 IN | WEIGHT: 149.01 LBS

## 2018-03-07 DIAGNOSIS — D63.1: ICD-10-CM

## 2018-03-07 DIAGNOSIS — F10.20: ICD-10-CM

## 2018-03-07 DIAGNOSIS — J21.9: ICD-10-CM

## 2018-03-07 DIAGNOSIS — I08.3: ICD-10-CM

## 2018-03-07 DIAGNOSIS — Z99.81: ICD-10-CM

## 2018-03-07 DIAGNOSIS — I12.9: ICD-10-CM

## 2018-03-07 DIAGNOSIS — R79.89: ICD-10-CM

## 2018-03-07 DIAGNOSIS — E83.51: ICD-10-CM

## 2018-03-07 DIAGNOSIS — N18.9: ICD-10-CM

## 2018-03-07 DIAGNOSIS — N17.9: ICD-10-CM

## 2018-03-07 DIAGNOSIS — J44.0: Primary | ICD-10-CM

## 2018-03-07 DIAGNOSIS — B96.89: ICD-10-CM

## 2018-03-07 DIAGNOSIS — Z95.0: ICD-10-CM

## 2018-03-07 DIAGNOSIS — J44.1: ICD-10-CM

## 2018-03-07 DIAGNOSIS — N39.0: ICD-10-CM

## 2018-03-07 DIAGNOSIS — E03.9: ICD-10-CM

## 2018-03-07 DIAGNOSIS — J18.9: ICD-10-CM

## 2018-03-07 LAB
ALBUMIN SERPL-MCNC: 3.2 G/DL (ref 3.4–5)
ALP SERPL-CCNC: 81 U/L (ref 46–116)
ALT SERPL-CCNC: 106 U/L (ref 10–68)
ANION GAP SERPL CALC-SCNC: 20.6 MMOL/L (ref 8–16)
APPEARANCE UR: CLEAR
BACTERIA #/AREA URNS HPF: (no result) /HPF
BASOPHILS NFR BLD AUTO: 0.2 % (ref 0–2)
BILIRUB SERPL-MCNC: 0.57 MG/DL (ref 0.2–1.3)
BILIRUB SERPL-MCNC: NEGATIVE MG/DL
BUN SERPL-MCNC: 15 MG/DL (ref 7–18)
CALCIUM SERPL-MCNC: 6.6 MG/DL (ref 8.5–10.1)
CHLORIDE SERPL-SCNC: 102 MMOL/L (ref 98–107)
CK SERPL-CCNC: 80 UL (ref 21–232)
CO2 SERPL-SCNC: 24.9 MMOL/L (ref 21–32)
COLOR UR: (no result)
CREAT SERPL-MCNC: 0.9 MG/DL (ref 0.6–1.3)
EOSINOPHIL NFR BLD: 2.5 % (ref 0–7)
ERYTHROCYTE [DISTWIDTH] IN BLOOD BY AUTOMATED COUNT: 19.3 % (ref 11.5–14.5)
GLOBULIN SER-MCNC: 4.6 G/L
GLUCOSE SERPL-MCNC: 67 MG/DL (ref 74–106)
GLUCOSE SERPL-MCNC: NEGATIVE MG/DL
HCT VFR BLD CALC: 41.2 % (ref 42–54)
HGB BLD-MCNC: 13.5 G/DL (ref 13.5–17.5)
IMM GRANULOCYTES NFR BLD: 0.2 % (ref 0–5)
INR PPP: 1.03 (ref 0.85–1.17)
KETONES UR STRIP-MCNC: NEGATIVE MG/DL
LYMPHOCYTES NFR BLD AUTO: 19.8 % (ref 15–50)
MCH RBC QN AUTO: 31 PG (ref 26–34)
MCHC RBC AUTO-ENTMCNC: 32.8 G/DL (ref 31–37)
MCV RBC: 94.5 FL (ref 80–100)
MONOCYTES NFR BLD: 7.5 % (ref 2–11)
NEUTROPHILS NFR BLD AUTO: 69.8 % (ref 40–80)
NITRITE UR-MCNC: NEGATIVE MG/ML
NT-PROBNP SERPL-MCNC: 4184 PG/ML (ref 0–125)
OSMOLALITY SERPL CALC.SUM OF ELEC: 285 MOSM/KG (ref 275–300)
PH UR STRIP: 5 [PH] (ref 5–6)
PLATELET # BLD: 184 10X3/UL (ref 130–400)
PMV BLD AUTO: 10.3 FL (ref 7.4–10.4)
POTASSIUM SERPL-SCNC: 3.5 MMOL/L (ref 3.5–5.1)
PROT SERPL-MCNC: 7.8 G/DL (ref 6.4–8.2)
PROT UR-MCNC: (no result) MG/DL
PROTHROMBIN TIME: 13.1 SECONDS (ref 11.6–15)
RBC # BLD AUTO: 4.36 10X6/UL (ref 4.2–6.1)
RBC #/AREA URNS HPF: (no result) /HPF (ref 0–5)
SODIUM SERPL-SCNC: 144 MMOL/L (ref 136–145)
SP GR UR STRIP: 1.02 (ref 1–1.02)
TROPONIN I SERPL-MCNC: 0.09 NG/ML (ref 0–0.06)
UROBILINOGEN UR-MCNC: NORMAL MG/DL
WBC # BLD AUTO: 8.8 10X3/UL (ref 4.8–10.8)
WBC #/AREA URNS HPF: (no result) /HPF (ref 0–5)

## 2018-03-08 VITALS — SYSTOLIC BLOOD PRESSURE: 108 MMHG | DIASTOLIC BLOOD PRESSURE: 48 MMHG

## 2018-03-08 VITALS — SYSTOLIC BLOOD PRESSURE: 110 MMHG | DIASTOLIC BLOOD PRESSURE: 58 MMHG

## 2018-03-08 VITALS — DIASTOLIC BLOOD PRESSURE: 71 MMHG | SYSTOLIC BLOOD PRESSURE: 143 MMHG

## 2018-03-08 VITALS — DIASTOLIC BLOOD PRESSURE: 49 MMHG | SYSTOLIC BLOOD PRESSURE: 96 MMHG

## 2018-03-08 VITALS — SYSTOLIC BLOOD PRESSURE: 112 MMHG | DIASTOLIC BLOOD PRESSURE: 52 MMHG

## 2018-03-08 VITALS — DIASTOLIC BLOOD PRESSURE: 57 MMHG | SYSTOLIC BLOOD PRESSURE: 110 MMHG

## 2018-03-08 LAB
ANION GAP SERPL CALC-SCNC: 16.1 MMOL/L (ref 8–16)
BASOPHILS NFR BLD AUTO: 0.4 % (ref 0–2)
BUN SERPL-MCNC: 20 MG/DL (ref 7–18)
CALCIUM SERPL-MCNC: 5.9 MG/DL (ref 8.5–10.1)
CHLORIDE SERPL-SCNC: 103 MMOL/L (ref 98–107)
CK MB SERPL-MCNC: 1.5 U/L (ref 0–3.6)
CO2 SERPL-SCNC: 28.8 MMOL/L (ref 21–32)
CREAT SERPL-MCNC: 1.2 MG/DL (ref 0.6–1.3)
EOSINOPHIL NFR BLD: 1.1 % (ref 0–7)
ERYTHROCYTE [DISTWIDTH] IN BLOOD BY AUTOMATED COUNT: 19.4 % (ref 11.5–14.5)
GLUCOSE SERPL-MCNC: 86 MG/DL (ref 74–106)
HCT VFR BLD CALC: 37.9 % (ref 42–54)
HGB BLD-MCNC: 12.3 G/DL (ref 13.5–17.5)
IMM GRANULOCYTES NFR BLD: 0.4 % (ref 0–5)
LYMPHOCYTES NFR BLD AUTO: 26.3 % (ref 15–50)
MCH RBC QN AUTO: 30.9 PG (ref 26–34)
MCHC RBC AUTO-ENTMCNC: 32.5 G/DL (ref 31–37)
MCV RBC: 95.2 FL (ref 80–100)
MONOCYTES NFR BLD: 9.3 % (ref 2–11)
NEUTROPHILS NFR BLD AUTO: 62.5 % (ref 40–80)
OSMOLALITY SERPL CALC.SUM OF ELEC: 288 MOSM/KG (ref 275–300)
PLATELET # BLD: 182 10X3/UL (ref 130–400)
PMV BLD AUTO: 9.8 FL (ref 7.4–10.4)
POTASSIUM SERPL-SCNC: 3.9 MMOL/L (ref 3.5–5.1)
RBC # BLD AUTO: 3.98 10X6/UL (ref 4.2–6.1)
SODIUM SERPL-SCNC: 144 MMOL/L (ref 136–145)
TROPONIN I SERPL-MCNC: 0.15 NG/ML (ref 0–0.06)
TSH SERPL-ACNC: 7.71 UIU/ML (ref 0.36–3.74)
WBC # BLD AUTO: 7.3 10X3/UL (ref 4.8–10.8)

## 2018-03-09 VITALS — SYSTOLIC BLOOD PRESSURE: 113 MMHG | DIASTOLIC BLOOD PRESSURE: 65 MMHG

## 2018-03-09 VITALS — DIASTOLIC BLOOD PRESSURE: 68 MMHG | SYSTOLIC BLOOD PRESSURE: 136 MMHG

## 2018-03-09 VITALS — SYSTOLIC BLOOD PRESSURE: 122 MMHG | DIASTOLIC BLOOD PRESSURE: 71 MMHG

## 2018-03-09 VITALS — DIASTOLIC BLOOD PRESSURE: 56 MMHG | SYSTOLIC BLOOD PRESSURE: 115 MMHG

## 2018-03-09 VITALS — SYSTOLIC BLOOD PRESSURE: 132 MMHG | DIASTOLIC BLOOD PRESSURE: 66 MMHG

## 2018-03-09 LAB
ALBUMIN SERPL-MCNC: 2.4 G/DL (ref 3.4–5)
ALP SERPL-CCNC: 58 U/L (ref 46–116)
ALT SERPL-CCNC: 55 U/L (ref 10–68)
ANION GAP SERPL CALC-SCNC: 14 MMOL/L (ref 8–16)
BASOPHILS NFR BLD AUTO: 1.5 % (ref 0–2)
BILIRUB SERPL-MCNC: 0.4 MG/DL (ref 0.2–1.3)
BUN SERPL-MCNC: 22 MG/DL (ref 7–18)
CALCIUM SERPL-MCNC: 5.7 MG/DL (ref 8.5–10.1)
CHLORIDE SERPL-SCNC: 101 MMOL/L (ref 98–107)
CO2 SERPL-SCNC: 28 MMOL/L (ref 21–32)
CREAT SERPL-MCNC: 1.4 MG/DL (ref 0.6–1.3)
EOSINOPHIL NFR BLD: 2.6 % (ref 0–7)
ERYTHROCYTE [DISTWIDTH] IN BLOOD BY AUTOMATED COUNT: 19.5 % (ref 11.5–14.5)
GLOBULIN SER-MCNC: 4.4 G/L
GLUCOSE SERPL-MCNC: 94 MG/DL (ref 74–106)
HCT VFR BLD CALC: 45.2 % (ref 42–54)
HGB BLD-MCNC: 13.9 G/DL (ref 13.5–17.5)
IMM GRANULOCYTES NFR BLD: 0.3 % (ref 0–5)
LYMPHOCYTES NFR BLD AUTO: 31.1 % (ref 15–50)
MAGNESIUM SERPL-MCNC: 1.4 MG/DL (ref 1.8–2.4)
MCH RBC QN AUTO: 29.1 PG (ref 26–34)
MCHC RBC AUTO-ENTMCNC: 30.8 G/DL (ref 31–37)
MCV RBC: 94.6 FL (ref 80–100)
MONOCYTES NFR BLD: 7.7 % (ref 2–11)
NEUTROPHILS NFR BLD AUTO: 56.8 % (ref 40–80)
OSMOLALITY SERPL CALC.SUM OF ELEC: 281 MOSM/KG (ref 275–300)
PHOSPHATE SERPL-MCNC: 5.9 MG/DL (ref 2.5–4.9)
PLATELET # BLD: 125 10X3/UL (ref 130–400)
PMV BLD AUTO: 10.7 FL (ref 7.4–10.4)
POTASSIUM SERPL-SCNC: 3 MMOL/L (ref 3.5–5.1)
PROT SERPL-MCNC: 6.8 G/DL (ref 6.4–8.2)
RBC # BLD AUTO: 4.78 10X6/UL (ref 4.2–6.1)
SODIUM SERPL-SCNC: 140 MMOL/L (ref 136–145)
WBC # BLD AUTO: 3.9 10X3/UL (ref 4.8–10.8)

## 2018-03-10 VITALS — SYSTOLIC BLOOD PRESSURE: 92 MMHG | DIASTOLIC BLOOD PRESSURE: 56 MMHG

## 2018-03-10 VITALS — SYSTOLIC BLOOD PRESSURE: 98 MMHG | DIASTOLIC BLOOD PRESSURE: 58 MMHG

## 2018-03-10 VITALS — DIASTOLIC BLOOD PRESSURE: 51 MMHG | SYSTOLIC BLOOD PRESSURE: 99 MMHG

## 2018-03-10 VITALS — SYSTOLIC BLOOD PRESSURE: 95 MMHG | DIASTOLIC BLOOD PRESSURE: 50 MMHG

## 2018-03-10 VITALS — SYSTOLIC BLOOD PRESSURE: 94 MMHG | DIASTOLIC BLOOD PRESSURE: 44 MMHG

## 2018-03-10 VITALS — SYSTOLIC BLOOD PRESSURE: 100 MMHG | DIASTOLIC BLOOD PRESSURE: 55 MMHG

## 2018-03-10 VITALS — DIASTOLIC BLOOD PRESSURE: 51 MMHG | SYSTOLIC BLOOD PRESSURE: 96 MMHG

## 2018-03-10 LAB
ANION GAP SERPL CALC-SCNC: 17.2 MMOL/L (ref 8–16)
BASOPHILS NFR BLD AUTO: 0.9 % (ref 0–2)
BUN SERPL-MCNC: 22 MG/DL (ref 7–18)
CALCIUM SERPL-MCNC: 6.2 MG/DL (ref 8.5–10.1)
CHLORIDE SERPL-SCNC: 100 MMOL/L (ref 98–107)
CO2 SERPL-SCNC: 28.6 MMOL/L (ref 21–32)
CREAT SERPL-MCNC: 1.3 MG/DL (ref 0.6–1.3)
EOSINOPHIL NFR BLD: 5.1 % (ref 0–7)
ERYTHROCYTE [DISTWIDTH] IN BLOOD BY AUTOMATED COUNT: 19.5 % (ref 11.5–14.5)
GLUCOSE SERPL-MCNC: 94 MG/DL (ref 74–106)
HCT VFR BLD CALC: 38 % (ref 42–54)
HGB BLD-MCNC: 12.5 G/DL (ref 13.5–17.5)
IMM GRANULOCYTES NFR BLD: 0.2 % (ref 0–5)
LYMPHOCYTES NFR BLD AUTO: 37.9 % (ref 15–50)
MAGNESIUM SERPL-MCNC: 1.8 MG/DL (ref 1.8–2.4)
MCH RBC QN AUTO: 30.8 PG (ref 26–34)
MCHC RBC AUTO-ENTMCNC: 32.9 G/DL (ref 31–37)
MCV RBC: 93.6 FL (ref 80–100)
MONOCYTES NFR BLD: 8.3 % (ref 2–11)
NEUTROPHILS NFR BLD AUTO: 47.6 % (ref 40–80)
OSMOLALITY SERPL CALC.SUM OF ELEC: 285 MOSM/KG (ref 275–300)
PHOSPHATE SERPL-MCNC: 5.7 MG/DL (ref 2.5–4.9)
PLATELET # BLD: 219 10X3/UL (ref 130–400)
PMV BLD AUTO: 10.4 FL (ref 7.4–10.4)
POTASSIUM SERPL-SCNC: 3.8 MMOL/L (ref 3.5–5.1)
RBC # BLD AUTO: 4.06 10X6/UL (ref 4.2–6.1)
SODIUM SERPL-SCNC: 142 MMOL/L (ref 136–145)
WBC # BLD AUTO: 5.5 10X3/UL (ref 4.8–10.8)

## 2018-03-11 VITALS — SYSTOLIC BLOOD PRESSURE: 115 MMHG | DIASTOLIC BLOOD PRESSURE: 44 MMHG

## 2018-03-11 VITALS — DIASTOLIC BLOOD PRESSURE: 57 MMHG | SYSTOLIC BLOOD PRESSURE: 108 MMHG

## 2018-03-11 VITALS — DIASTOLIC BLOOD PRESSURE: 65 MMHG | SYSTOLIC BLOOD PRESSURE: 135 MMHG

## 2018-03-11 VITALS — DIASTOLIC BLOOD PRESSURE: 55 MMHG | SYSTOLIC BLOOD PRESSURE: 127 MMHG

## 2018-03-11 VITALS — SYSTOLIC BLOOD PRESSURE: 117 MMHG | DIASTOLIC BLOOD PRESSURE: 63 MMHG

## 2018-03-11 LAB
ALBUMIN SERPL-MCNC: 2.8 G/DL (ref 3.4–5)
ALP SERPL-CCNC: 69 U/L (ref 46–116)
ALT SERPL-CCNC: 41 U/L (ref 10–68)
ANION GAP SERPL CALC-SCNC: 14.3 MMOL/L (ref 8–16)
BASOPHILS NFR BLD AUTO: 0.5 % (ref 0–2)
BILIRUB SERPL-MCNC: 0.37 MG/DL (ref 0.2–1.3)
BUN SERPL-MCNC: 21 MG/DL (ref 7–18)
CALCIUM SERPL-MCNC: 6.5 MG/DL (ref 8.5–10.1)
CHLORIDE SERPL-SCNC: 101 MMOL/L (ref 98–107)
CO2 SERPL-SCNC: 27.6 MMOL/L (ref 21–32)
CREAT SERPL-MCNC: 1.4 MG/DL (ref 0.6–1.3)
EOSINOPHIL NFR BLD: 2.7 % (ref 0–7)
ERYTHROCYTE [DISTWIDTH] IN BLOOD BY AUTOMATED COUNT: 19.3 % (ref 11.5–14.5)
GLOBULIN SER-MCNC: 4.1 G/L
GLUCOSE SERPL-MCNC: 94 MG/DL (ref 74–106)
HCT VFR BLD CALC: 34.8 % (ref 42–54)
HGB BLD-MCNC: 11.6 G/DL (ref 13.5–17.5)
IMM GRANULOCYTES NFR BLD: 0.2 % (ref 0–5)
LYMPHOCYTES NFR BLD AUTO: 34.5 % (ref 15–50)
MCH RBC QN AUTO: 30.7 PG (ref 26–34)
MCHC RBC AUTO-ENTMCNC: 33.3 G/DL (ref 31–37)
MCV RBC: 92.1 FL (ref 80–100)
MONOCYTES NFR BLD: 8.2 % (ref 2–11)
NEUTROPHILS NFR BLD AUTO: 53.9 % (ref 40–80)
OSMOLALITY SERPL CALC.SUM OF ELEC: 280 MOSM/KG (ref 275–300)
PLATELET # BLD: 164 10X3/UL (ref 130–400)
PMV BLD AUTO: 11.1 FL (ref 7.4–10.4)
POTASSIUM SERPL-SCNC: 3.9 MMOL/L (ref 3.5–5.1)
PROT SERPL-MCNC: 6.9 G/DL (ref 6.4–8.2)
RBC # BLD AUTO: 3.78 10X6/UL (ref 4.2–6.1)
SODIUM SERPL-SCNC: 139 MMOL/L (ref 136–145)
WBC # BLD AUTO: 6.2 10X3/UL (ref 4.8–10.8)

## 2018-03-12 VITALS — SYSTOLIC BLOOD PRESSURE: 94 MMHG | DIASTOLIC BLOOD PRESSURE: 46 MMHG

## 2018-03-12 VITALS — SYSTOLIC BLOOD PRESSURE: 105 MMHG | DIASTOLIC BLOOD PRESSURE: 50 MMHG

## 2018-03-12 VITALS — SYSTOLIC BLOOD PRESSURE: 93 MMHG | DIASTOLIC BLOOD PRESSURE: 61 MMHG

## 2018-03-12 VITALS — DIASTOLIC BLOOD PRESSURE: 70 MMHG | SYSTOLIC BLOOD PRESSURE: 105 MMHG

## 2018-03-12 VITALS — SYSTOLIC BLOOD PRESSURE: 111 MMHG | DIASTOLIC BLOOD PRESSURE: 67 MMHG

## 2018-03-12 LAB
ALBUMIN SERPL-MCNC: 2.7 G/DL (ref 3.4–5)
ALP SERPL-CCNC: 67 U/L (ref 46–116)
ALT SERPL-CCNC: 34 U/L (ref 10–68)
ANION GAP SERPL CALC-SCNC: 18.7 MMOL/L (ref 8–16)
BASOPHILS NFR BLD AUTO: 0.3 % (ref 0–2)
BILIRUB SERPL-MCNC: 0.4 MG/DL (ref 0.2–1.3)
BUN SERPL-MCNC: 17 MG/DL (ref 7–18)
CALCIUM SERPL-MCNC: 6.9 MG/DL (ref 8.5–10.1)
CHLORIDE SERPL-SCNC: 98 MMOL/L (ref 98–107)
CO2 SERPL-SCNC: 25.1 MMOL/L (ref 21–32)
CREAT SERPL-MCNC: 1.3 MG/DL (ref 0.6–1.3)
EOSINOPHIL NFR BLD: 3 % (ref 0–7)
ERYTHROCYTE [DISTWIDTH] IN BLOOD BY AUTOMATED COUNT: 19.3 % (ref 11.5–14.5)
GLOBULIN SER-MCNC: 4.7 G/L
GLUCOSE SERPL-MCNC: 70 MG/DL (ref 74–106)
HCT VFR BLD CALC: 36.3 % (ref 42–54)
HGB BLD-MCNC: 11.9 G/DL (ref 13.5–17.5)
IMM GRANULOCYTES NFR BLD: 0.2 % (ref 0–5)
LYMPHOCYTES NFR BLD AUTO: 23.2 % (ref 15–50)
MCH RBC QN AUTO: 30.4 PG (ref 26–34)
MCHC RBC AUTO-ENTMCNC: 32.8 G/DL (ref 31–37)
MCV RBC: 92.6 FL (ref 80–100)
MONOCYTES NFR BLD: 4.9 % (ref 2–11)
NEUTROPHILS NFR BLD AUTO: 68.4 % (ref 40–80)
OSMOLALITY SERPL CALC.SUM OF ELEC: 275 MOSM/KG (ref 275–300)
PLATELET # BLD: 257 10X3/UL (ref 130–400)
PMV BLD AUTO: 10.5 FL (ref 7.4–10.4)
POTASSIUM SERPL-SCNC: 3.8 MMOL/L (ref 3.5–5.1)
PROT SERPL-MCNC: 7.4 G/DL (ref 6.4–8.2)
RBC # BLD AUTO: 3.92 10X6/UL (ref 4.2–6.1)
SODIUM SERPL-SCNC: 138 MMOL/L (ref 136–145)
WBC # BLD AUTO: 9.2 10X3/UL (ref 4.8–10.8)

## 2018-03-13 VITALS — DIASTOLIC BLOOD PRESSURE: 66 MMHG | SYSTOLIC BLOOD PRESSURE: 110 MMHG

## 2018-03-13 VITALS — SYSTOLIC BLOOD PRESSURE: 118 MMHG | DIASTOLIC BLOOD PRESSURE: 58 MMHG

## 2018-03-13 VITALS — SYSTOLIC BLOOD PRESSURE: 114 MMHG | DIASTOLIC BLOOD PRESSURE: 63 MMHG

## 2018-03-13 VITALS — DIASTOLIC BLOOD PRESSURE: 50 MMHG | SYSTOLIC BLOOD PRESSURE: 104 MMHG

## 2018-03-13 VITALS — DIASTOLIC BLOOD PRESSURE: 56 MMHG | SYSTOLIC BLOOD PRESSURE: 106 MMHG

## 2018-03-13 VITALS — SYSTOLIC BLOOD PRESSURE: 100 MMHG | DIASTOLIC BLOOD PRESSURE: 60 MMHG

## 2018-03-13 LAB
ALBUMIN SERPL-MCNC: 2.9 G/DL (ref 3.4–5)
ALP SERPL-CCNC: 78 U/L (ref 46–116)
ALT SERPL-CCNC: 30 U/L (ref 10–68)
ANION GAP SERPL CALC-SCNC: 12.7 MMOL/L (ref 8–16)
BASOPHILS NFR BLD AUTO: 0.2 % (ref 0–2)
BILIRUB SERPL-MCNC: 0.56 MG/DL (ref 0.2–1.3)
BUN SERPL-MCNC: 21 MG/DL (ref 7–18)
CALCIUM SERPL-MCNC: 8 MG/DL (ref 8.5–10.1)
CHLORIDE SERPL-SCNC: 98 MMOL/L (ref 98–107)
CO2 SERPL-SCNC: 30.1 MMOL/L (ref 21–32)
CREAT SERPL-MCNC: 1.7 MG/DL (ref 0.6–1.3)
EOSINOPHIL NFR BLD: 2.2 % (ref 0–7)
ERYTHROCYTE [DISTWIDTH] IN BLOOD BY AUTOMATED COUNT: 19 % (ref 11.5–14.5)
GLOBULIN SER-MCNC: 5.3 G/L
GLUCOSE SERPL-MCNC: 93 MG/DL (ref 74–106)
HCT VFR BLD CALC: 39.3 % (ref 42–54)
HGB BLD-MCNC: 13.1 G/DL (ref 13.5–17.5)
IMM GRANULOCYTES NFR BLD: 0.2 % (ref 0–5)
LYMPHOCYTES NFR BLD AUTO: 24.3 % (ref 15–50)
MCH RBC QN AUTO: 30.9 PG (ref 26–34)
MCHC RBC AUTO-ENTMCNC: 33.3 G/DL (ref 31–37)
MCV RBC: 92.7 FL (ref 80–100)
MONOCYTES NFR BLD: 5.2 % (ref 2–11)
NEUTROPHILS NFR BLD AUTO: 67.9 % (ref 40–80)
OSMOLALITY SERPL CALC.SUM OF ELEC: 276 MOSM/KG (ref 275–300)
PHOSPHATE SERPL-MCNC: 5.6 MG/DL (ref 2.5–4.9)
PLATELET # BLD: 295 10X3/UL (ref 130–400)
PMV BLD AUTO: 10.3 FL (ref 7.4–10.4)
POTASSIUM SERPL-SCNC: 3.8 MMOL/L (ref 3.5–5.1)
PROT SERPL-MCNC: 8.2 G/DL (ref 6.4–8.2)
RBC # BLD AUTO: 4.24 10X6/UL (ref 4.2–6.1)
SODIUM SERPL-SCNC: 137 MMOL/L (ref 136–145)
WBC # BLD AUTO: 8.3 10X3/UL (ref 4.8–10.8)

## 2018-03-14 VITALS — SYSTOLIC BLOOD PRESSURE: 111 MMHG | DIASTOLIC BLOOD PRESSURE: 44 MMHG

## 2018-03-14 VITALS — DIASTOLIC BLOOD PRESSURE: 60 MMHG | SYSTOLIC BLOOD PRESSURE: 122 MMHG

## 2018-03-14 VITALS — SYSTOLIC BLOOD PRESSURE: 110 MMHG | DIASTOLIC BLOOD PRESSURE: 55 MMHG

## 2018-03-14 VITALS — DIASTOLIC BLOOD PRESSURE: 55 MMHG | SYSTOLIC BLOOD PRESSURE: 101 MMHG

## 2018-03-14 VITALS — SYSTOLIC BLOOD PRESSURE: 104 MMHG | DIASTOLIC BLOOD PRESSURE: 61 MMHG

## 2018-03-14 VITALS — DIASTOLIC BLOOD PRESSURE: 53 MMHG | SYSTOLIC BLOOD PRESSURE: 96 MMHG

## 2018-03-14 LAB
ANION GAP SERPL CALC-SCNC: 16.9 MMOL/L (ref 8–16)
BUN SERPL-MCNC: 28 MG/DL (ref 7–18)
CALCIUM SERPL-MCNC: 8.1 MG/DL (ref 8.5–10.1)
CHLORIDE SERPL-SCNC: 98 MMOL/L (ref 98–107)
CO2 SERPL-SCNC: 25 MMOL/L (ref 21–32)
CREAT SERPL-MCNC: 1.9 MG/DL (ref 0.6–1.3)
GLUCOSE SERPL-MCNC: 130 MG/DL (ref 74–106)
OSMOLALITY SERPL CALC.SUM OF ELEC: 277 MOSM/KG (ref 275–300)
POTASSIUM SERPL-SCNC: 4.9 MMOL/L (ref 3.5–5.1)
SODIUM SERPL-SCNC: 135 MMOL/L (ref 136–145)

## 2018-03-15 VITALS — DIASTOLIC BLOOD PRESSURE: 66 MMHG | SYSTOLIC BLOOD PRESSURE: 103 MMHG

## 2018-03-15 VITALS — SYSTOLIC BLOOD PRESSURE: 131 MMHG | DIASTOLIC BLOOD PRESSURE: 73 MMHG

## 2018-03-15 VITALS — SYSTOLIC BLOOD PRESSURE: 114 MMHG | DIASTOLIC BLOOD PRESSURE: 66 MMHG

## 2018-03-15 VITALS — SYSTOLIC BLOOD PRESSURE: 106 MMHG | DIASTOLIC BLOOD PRESSURE: 56 MMHG

## 2018-03-15 LAB
ALBUMIN SERPL-MCNC: 3.1 G/DL (ref 3.4–5)
ALP SERPL-CCNC: 79 U/L (ref 46–116)
ALT SERPL-CCNC: 26 U/L (ref 10–68)
ANION GAP SERPL CALC-SCNC: 14 MMOL/L (ref 8–16)
APPEARANCE UR: CLEAR
BASOPHILS NFR BLD AUTO: 0.1 % (ref 0–2)
BILIRUB SERPL-MCNC: 0.5 MG/DL (ref 0.2–1.3)
BILIRUB SERPL-MCNC: NEGATIVE MG/DL
BUN SERPL-MCNC: 33 MG/DL (ref 7–18)
CALCIUM SERPL-MCNC: 7.7 MG/DL (ref 8.5–10.1)
CHLORIDE SERPL-SCNC: 100 MMOL/L (ref 98–107)
CO2 SERPL-SCNC: 28 MMOL/L (ref 21–32)
COLOR UR: YELLOW
CREAT SERPL-MCNC: 1.8 MG/DL (ref 0.6–1.3)
CREATININE - URINE: 35.3 MG/DL (ref 30–125)
EOSINOPHIL NFR BLD: 0.2 % (ref 0–7)
ERYTHROCYTE [DISTWIDTH] IN BLOOD BY AUTOMATED COUNT: 18.9 % (ref 11.5–14.5)
GLOBULIN SER-MCNC: 5.3 G/L
GLUCOSE SERPL-MCNC: 110 MG/DL (ref 74–106)
GLUCOSE SERPL-MCNC: NEGATIVE MG/DL
HCT VFR BLD CALC: 36.5 % (ref 42–54)
HGB BLD-MCNC: 12.5 G/DL (ref 13.5–17.5)
IMM GRANULOCYTES NFR BLD: 0.3 % (ref 0–5)
KETONES UR STRIP-MCNC: NEGATIVE MG/DL
LYMPHOCYTES NFR BLD AUTO: 12.3 % (ref 15–50)
MCH RBC QN AUTO: 31.6 PG (ref 26–34)
MCHC RBC AUTO-ENTMCNC: 34.2 G/DL (ref 31–37)
MCV RBC: 92.4 FL (ref 80–100)
MONOCYTES NFR BLD: 5.2 % (ref 2–11)
NEUTROPHILS NFR BLD AUTO: 81.9 % (ref 40–80)
NITRITE UR-MCNC: NEGATIVE MG/ML
OSMOLALITY SERPL CALC.SUM OF ELEC: 281 MOSM/KG (ref 275–300)
PH UR STRIP: 5 [PH] (ref 5–6)
PHOSPHATE SERPL-MCNC: 5 MG/DL (ref 2.5–4.9)
PLATELET # BLD: 324 10X3/UL (ref 130–400)
PMV BLD AUTO: 10.2 FL (ref 7.4–10.4)
POTASSIUM SERPL-SCNC: 5 MMOL/L (ref 3.5–5.1)
PROT SERPL-MCNC: 8.4 G/DL (ref 6.4–8.2)
PROT UR-MCNC: 0.7 MG/DL (ref 0–11.9)
PROT UR-MCNC: NEGATIVE MG/DL
PROT/CREAT UR: 0 MG/G
RBC # BLD AUTO: 3.95 10X6/UL (ref 4.2–6.1)
SODIUM SERPL-SCNC: 137 MMOL/L (ref 136–145)
SODIUM UR-SCNC: 104 MMOL/L (ref 20–110)
SP GR UR STRIP: 1.01 (ref 1–1.02)
UROBILINOGEN UR-MCNC: NORMAL MG/DL
WBC # BLD AUTO: 10 10X3/UL (ref 4.8–10.8)

## 2018-03-27 ENCOUNTER — HOSPITAL ENCOUNTER (INPATIENT)
Dept: HOSPITAL 84 - D.ER | Age: 63
Discharge: HOME | DRG: 310 | End: 2018-03-27
Attending: INTERNAL MEDICINE | Admitting: INTERNAL MEDICINE
Payer: MEDICAID

## 2018-03-27 VITALS — BODY MASS INDEX: 20.3 KG/M2 | HEIGHT: 71 IN | WEIGHT: 145 LBS | BODY MASS INDEX: 20.3 KG/M2

## 2018-03-27 VITALS — SYSTOLIC BLOOD PRESSURE: 106 MMHG | DIASTOLIC BLOOD PRESSURE: 64 MMHG

## 2018-03-27 VITALS — DIASTOLIC BLOOD PRESSURE: 64 MMHG | SYSTOLIC BLOOD PRESSURE: 126 MMHG

## 2018-03-27 DIAGNOSIS — E78.5: ICD-10-CM

## 2018-03-27 DIAGNOSIS — E03.9: ICD-10-CM

## 2018-03-27 DIAGNOSIS — J44.9: ICD-10-CM

## 2018-03-27 DIAGNOSIS — I10: ICD-10-CM

## 2018-03-27 DIAGNOSIS — I48.0: Primary | ICD-10-CM

## 2018-03-27 DIAGNOSIS — Z95.0: ICD-10-CM

## 2018-03-27 DIAGNOSIS — F41.9: ICD-10-CM

## 2018-03-27 DIAGNOSIS — F32.9: ICD-10-CM

## 2018-03-27 DIAGNOSIS — Z72.0: ICD-10-CM

## 2018-03-27 LAB
ALBUMIN SERPL-MCNC: 3.3 G/DL (ref 3.4–5)
ALP SERPL-CCNC: 81 U/L (ref 46–116)
ALT SERPL-CCNC: 24 U/L (ref 10–68)
ANION GAP SERPL CALC-SCNC: 19.8 MMOL/L (ref 8–16)
APTT BLD: 28.3 SECONDS (ref 22.8–39.4)
BASOPHILS NFR BLD AUTO: 0.6 % (ref 0–2)
BILIRUB SERPL-MCNC: 0.61 MG/DL (ref 0.2–1.3)
BUN SERPL-MCNC: 15 MG/DL (ref 7–18)
CALCIUM SERPL-MCNC: 7.2 MG/DL (ref 8.5–10.1)
CHLORIDE SERPL-SCNC: 103 MMOL/L (ref 98–107)
CHOLEST/HDLC SERPL: 3.5 RATIO (ref 2.3–4.9)
CK MB SERPL-MCNC: 2.8 U/L (ref 0–3.6)
CK SERPL-CCNC: 82 UL (ref 21–232)
CK SERPL-CCNC: 95 UL (ref 21–232)
CO2 SERPL-SCNC: 23.9 MMOL/L (ref 21–32)
CREAT SERPL-MCNC: 1.2 MG/DL (ref 0.6–1.3)
D DIMER PPP FEU-MCNC: 1.21 UG/MLFEU (ref 0.2–0.54)
EOSINOPHIL NFR BLD: 2.8 % (ref 0–7)
ERYTHROCYTE [DISTWIDTH] IN BLOOD BY AUTOMATED COUNT: 19.5 % (ref 11.5–14.5)
GLOBULIN SER-MCNC: 4.7 G/L
GLUCOSE SERPL-MCNC: 74 MG/DL (ref 74–106)
HCT VFR BLD CALC: 40 % (ref 42–54)
HDLC SERPL-MCNC: 82 MG/DL (ref 32–96)
HGB BLD-MCNC: 13.9 G/DL (ref 13.5–17.5)
IMM GRANULOCYTES NFR BLD: 0.2 % (ref 0–5)
INR PPP: 0.98 (ref 0.85–1.17)
LDL-HDL RATIO: 2.2 RATIO (ref 1.5–3.5)
LDLC SERPL-MCNC: 177 MG/DL (ref 0–100)
LYMPHOCYTES NFR BLD AUTO: 48.8 % (ref 15–50)
MAGNESIUM SERPL-MCNC: 1.3 MG/DL (ref 1.8–2.4)
MCH RBC QN AUTO: 31.6 PG (ref 26–34)
MCHC RBC AUTO-ENTMCNC: 34.8 G/DL (ref 31–37)
MCV RBC: 90.9 FL (ref 80–100)
MONOCYTES NFR BLD: 6.9 % (ref 2–11)
NEUTROPHILS NFR BLD AUTO: 40.7 % (ref 40–80)
NT-PROBNP SERPL-MCNC: 1128 PG/ML (ref 0–125)
OSMOLALITY SERPL CALC.SUM OF ELEC: 284 MOSM/KG (ref 275–300)
PLATELET # BLD: 256 10X3/UL (ref 130–400)
PMV BLD AUTO: 9.7 FL (ref 7.4–10.4)
POTASSIUM SERPL-SCNC: 3.7 MMOL/L (ref 3.5–5.1)
PROT SERPL-MCNC: 8 G/DL (ref 6.4–8.2)
PROTHROMBIN TIME: 12.6 SECONDS (ref 11.6–15)
RBC # BLD AUTO: 4.4 10X6/UL (ref 4.2–6.1)
SODIUM SERPL-SCNC: 143 MMOL/L (ref 136–145)
TRIGL SERPL-MCNC: 123 MG/DL (ref 30–200)
TROPONIN I SERPL-MCNC: 0.09 NG/ML (ref 0–0.06)
TROPONIN I SERPL-MCNC: 0.11 NG/ML (ref 0–0.06)
WBC # BLD AUTO: 9.6 10X3/UL (ref 4.8–10.8)

## 2019-01-16 ENCOUNTER — HOSPITAL ENCOUNTER (EMERGENCY)
Dept: HOSPITAL 84 - D.ER | Age: 64
LOS: 1 days | Discharge: HOME | End: 2019-01-17
Payer: MEDICAID

## 2019-01-16 VITALS
HEIGHT: 71 IN | HEIGHT: 71 IN | BODY MASS INDEX: 21.04 KG/M2 | WEIGHT: 150.32 LBS | BODY MASS INDEX: 21.04 KG/M2 | WEIGHT: 150.32 LBS

## 2019-01-16 DIAGNOSIS — Z99.81: ICD-10-CM

## 2019-01-16 DIAGNOSIS — I10: ICD-10-CM

## 2019-01-16 DIAGNOSIS — F10.10: ICD-10-CM

## 2019-01-16 DIAGNOSIS — E16.2: Primary | ICD-10-CM

## 2019-01-16 DIAGNOSIS — F17.200: ICD-10-CM

## 2019-01-16 DIAGNOSIS — E83.51: ICD-10-CM

## 2019-01-16 DIAGNOSIS — J44.9: ICD-10-CM

## 2019-01-16 LAB
ALBUMIN SERPL-MCNC: 3.1 G/DL (ref 3.4–5)
ALP SERPL-CCNC: 111 U/L (ref 46–116)
ALT SERPL-CCNC: 35 U/L (ref 10–68)
ANION GAP SERPL CALC-SCNC: 21.2 MMOL/L (ref 8–16)
APPEARANCE UR: CLEAR
BASOPHILS NFR BLD AUTO: 0.4 % (ref 0–2)
BILIRUB SERPL-MCNC: 0.48 MG/DL (ref 0.2–1.3)
BILIRUB SERPL-MCNC: NEGATIVE MG/DL
BUN SERPL-MCNC: 29 MG/DL (ref 7–18)
CALCIUM SERPL-MCNC: 6.8 MG/DL (ref 8.5–10.1)
CHLORIDE SERPL-SCNC: 101 MMOL/L (ref 98–107)
CO2 SERPL-SCNC: 23 MMOL/L (ref 21–32)
COLOR UR: YELLOW
CREAT SERPL-MCNC: 1.5 MG/DL (ref 0.6–1.3)
EOSINOPHIL NFR BLD: 0 % (ref 0–7)
ERYTHROCYTE [DISTWIDTH] IN BLOOD BY AUTOMATED COUNT: 14.8 % (ref 11.5–14.5)
ETHANOL SERPL-MCNC: 5 MG/DL (ref 0–10)
GLOBULIN SER-MCNC: 4.8 G/L
GLUCOSE SERPL-MCNC: 164 MG/DL (ref 74–106)
GLUCOSE SERPL-MCNC: NEGATIVE MG/DL
HCT VFR BLD CALC: 29.5 % (ref 42–54)
HGB BLD-MCNC: 9.8 G/DL (ref 13.5–17.5)
IMM GRANULOCYTES NFR BLD: 0.3 % (ref 0–5)
KETONES UR STRIP-MCNC: (no result) MG/DL
LIPASE SERPL-CCNC: 92 U/L (ref 73–393)
LYMPHOCYTES NFR BLD AUTO: 12.6 % (ref 15–50)
MCH RBC QN AUTO: 32.2 PG (ref 26–34)
MCHC RBC AUTO-ENTMCNC: 33.2 G/DL (ref 31–37)
MCV RBC: 97 FL (ref 80–100)
MONOCYTES NFR BLD: 3.8 % (ref 2–11)
NEUTROPHILS NFR BLD AUTO: 82.9 % (ref 40–80)
NITRITE UR-MCNC: NEGATIVE MG/ML
OSMOLALITY SERPL CALC.SUM OF ELEC: 290 MOSM/KG (ref 275–300)
PH UR STRIP: 5 [PH] (ref 5–6)
PLATELET # BLD: 330 10X3/UL (ref 130–400)
PMV BLD AUTO: 9.3 FL (ref 7.4–10.4)
POTASSIUM SERPL-SCNC: 4.2 MMOL/L (ref 3.5–5.1)
PROT SERPL-MCNC: 7.9 G/DL (ref 6.4–8.2)
PROT UR-MCNC: NEGATIVE MG/DL
RBC # BLD AUTO: 3.04 10X6/UL (ref 4.2–6.1)
SODIUM SERPL-SCNC: 141 MMOL/L (ref 136–145)
SP GR UR STRIP: 1.02 (ref 1–1.02)
TROPONIN I SERPL-MCNC: 0.05 NG/ML (ref 0–0.06)
TSH SERPL-ACNC: 7.56 UIU/ML (ref 0.36–3.74)
UROBILINOGEN UR-MCNC: NORMAL MG/DL
WBC # BLD AUTO: 7.2 10X3/UL (ref 4.8–10.8)

## 2019-01-17 VITALS — DIASTOLIC BLOOD PRESSURE: 54 MMHG | SYSTOLIC BLOOD PRESSURE: 112 MMHG

## 2019-01-22 ENCOUNTER — HOSPITAL ENCOUNTER (EMERGENCY)
Dept: HOSPITAL 84 - D.ER | Age: 64
Discharge: HOME | End: 2019-01-22
Payer: MEDICAID

## 2019-01-22 VITALS
WEIGHT: 150.32 LBS | HEIGHT: 71 IN | HEIGHT: 71 IN | WEIGHT: 150.32 LBS | BODY MASS INDEX: 21.04 KG/M2 | BODY MASS INDEX: 21.04 KG/M2

## 2019-01-22 VITALS — DIASTOLIC BLOOD PRESSURE: 63 MMHG | SYSTOLIC BLOOD PRESSURE: 136 MMHG

## 2019-01-22 DIAGNOSIS — J44.9: ICD-10-CM
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
## 2019-01-22 DIAGNOSIS — I10: ICD-10-CM

## 2019-01-22 DIAGNOSIS — S43.402A: Primary | ICD-10-CM
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
## 2019-01-22 DIAGNOSIS — S16.1XXA: ICD-10-CM

## 2019-01-25 ENCOUNTER — HOSPITAL ENCOUNTER (EMERGENCY)
Dept: HOSPITAL 84 - D.ER | Age: 64
LOS: 1 days | Discharge: HOME | End: 2019-01-26
Payer: MEDICAID

## 2019-01-25 DIAGNOSIS — F17.200: ICD-10-CM

## 2019-01-25 DIAGNOSIS — M53.3: Primary | ICD-10-CM
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
## 2019-01-26 VITALS — DIASTOLIC BLOOD PRESSURE: 76 MMHG | SYSTOLIC BLOOD PRESSURE: 138 MMHG

## 2019-01-28 ENCOUNTER — HOSPITAL ENCOUNTER (EMERGENCY)
Dept: HOSPITAL 84 - D.ER | Age: 64
Discharge: HOME | End: 2019-01-28
Payer: MEDICAID

## 2019-01-28 VITALS
BODY MASS INDEX: 21.04 KG/M2 | WEIGHT: 150.32 LBS | BODY MASS INDEX: 21.04 KG/M2 | HEIGHT: 71 IN | HEIGHT: 71 IN | WEIGHT: 150.32 LBS

## 2019-01-28 VITALS — DIASTOLIC BLOOD PRESSURE: 65 MMHG | SYSTOLIC BLOOD PRESSURE: 119 MMHG

## 2019-01-28 DIAGNOSIS — F17.200: ICD-10-CM

## 2019-01-28 DIAGNOSIS — T85.848A: Primary | ICD-10-CM

## 2019-01-31 ENCOUNTER — HOSPITAL ENCOUNTER (EMERGENCY)
Dept: HOSPITAL 84 - D.ER | Age: 64
Discharge: LEFT BEFORE DISCHARGE | End: 2019-01-31
Payer: MEDICAID

## 2019-01-31 VITALS — SYSTOLIC BLOOD PRESSURE: 131 MMHG | DIASTOLIC BLOOD PRESSURE: 71 MMHG

## 2019-01-31 VITALS
BODY MASS INDEX: 21.04 KG/M2 | WEIGHT: 150.32 LBS | WEIGHT: 150.32 LBS | HEIGHT: 71 IN | HEIGHT: 71 IN | BODY MASS INDEX: 21.04 KG/M2

## 2019-01-31 DIAGNOSIS — T85.848A: Primary | ICD-10-CM

## 2019-02-02 ENCOUNTER — HOSPITAL ENCOUNTER (EMERGENCY)
Dept: HOSPITAL 84 - D.ER | Age: 64
Discharge: HOME | End: 2019-02-02
Payer: MEDICARE

## 2019-02-02 VITALS
WEIGHT: 160.34 LBS | HEIGHT: 71 IN | BODY MASS INDEX: 22.45 KG/M2 | WEIGHT: 160.34 LBS | BODY MASS INDEX: 22.45 KG/M2 | HEIGHT: 71 IN

## 2019-02-02 VITALS — SYSTOLIC BLOOD PRESSURE: 132 MMHG | DIASTOLIC BLOOD PRESSURE: 70 MMHG

## 2019-02-02 DIAGNOSIS — K94.23: Primary | ICD-10-CM

## 2019-02-02 DIAGNOSIS — E83.51: ICD-10-CM

## 2019-02-02 LAB
ALBUMIN SERPL-MCNC: 3.1 G/DL (ref 3.4–5)
ALP SERPL-CCNC: 78 U/L (ref 46–116)
ALT SERPL-CCNC: 16 U/L (ref 10–68)
ANION GAP SERPL CALC-SCNC: 19 MMOL/L (ref 8–16)
BASOPHILS NFR BLD AUTO: 0.3 % (ref 0–2)
BILIRUB SERPL-MCNC: 0.61 MG/DL (ref 0.2–1.3)
BUN SERPL-MCNC: 11 MG/DL (ref 7–18)
CALCIUM SERPL-MCNC: 5.8 MG/DL (ref 8.5–10.1)
CHLORIDE SERPL-SCNC: 107 MMOL/L (ref 98–107)
CO2 SERPL-SCNC: 24 MMOL/L (ref 21–32)
CREAT SERPL-MCNC: 0.9 MG/DL (ref 0.6–1.3)
EOSINOPHIL NFR BLD: 1.2 % (ref 0–7)
ERYTHROCYTE [DISTWIDTH] IN BLOOD BY AUTOMATED COUNT: 16.1 % (ref 11.5–14.5)
ETHANOL SERPL-MCNC: 205 MG/DL (ref 0–10)
GLOBULIN SER-MCNC: 4.2 G/L
GLUCOSE SERPL-MCNC: 74 MG/DL (ref 74–106)
HCT VFR BLD CALC: 30.8 % (ref 42–54)
HGB BLD-MCNC: 10.4 G/DL (ref 13.5–17.5)
IMM GRANULOCYTES NFR BLD: 0.2 % (ref 0–5)
LYMPHOCYTES NFR BLD AUTO: 30.7 % (ref 15–50)
MCH RBC QN AUTO: 32.3 PG (ref 26–34)
MCHC RBC AUTO-ENTMCNC: 33.8 G/DL (ref 31–37)
MCV RBC: 95.7 FL (ref 80–100)
MONOCYTES NFR BLD: 5 % (ref 2–11)
NEUTROPHILS NFR BLD AUTO: 62.6 % (ref 40–80)
OSMOLALITY SERPL CALC.SUM OF ELEC: 289 MOSM/KG (ref 275–300)
PLATELET # BLD: 137 10X3/UL (ref 130–400)
PMV BLD AUTO: 9.1 FL (ref 7.4–10.4)
POTASSIUM SERPL-SCNC: 3 MMOL/L (ref 3.5–5.1)
PROT SERPL-MCNC: 7.3 G/DL (ref 6.4–8.2)
RBC # BLD AUTO: 3.22 10X6/UL (ref 4.2–6.1)
SODIUM SERPL-SCNC: 147 MMOL/L (ref 136–145)
WBC # BLD AUTO: 6 10X3/UL (ref 4.8–10.8)

## 2019-02-11 ENCOUNTER — HOSPITAL ENCOUNTER (EMERGENCY)
Dept: HOSPITAL 84 - D.ER | Age: 64
Discharge: HOME | End: 2019-02-11
Payer: MEDICARE

## 2019-02-11 VITALS — DIASTOLIC BLOOD PRESSURE: 68 MMHG | SYSTOLIC BLOOD PRESSURE: 131 MMHG

## 2019-02-11 VITALS
BODY MASS INDEX: 21.04 KG/M2 | HEIGHT: 71 IN | WEIGHT: 150.32 LBS | WEIGHT: 150.32 LBS | BODY MASS INDEX: 21.04 KG/M2 | HEIGHT: 71 IN

## 2019-02-11 DIAGNOSIS — Z99.81: ICD-10-CM
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
## 2019-02-11 DIAGNOSIS — F10.11: ICD-10-CM

## 2019-02-11 DIAGNOSIS — F17.200: ICD-10-CM

## 2019-02-11 DIAGNOSIS — J44.9: ICD-10-CM

## 2019-02-11 DIAGNOSIS — R51: ICD-10-CM
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
## 2019-02-11 DIAGNOSIS — M54.5: Primary | ICD-10-CM

## 2019-02-11 DIAGNOSIS — I10: ICD-10-CM

## 2019-02-13 ENCOUNTER — HOSPITAL ENCOUNTER (EMERGENCY)
Dept: HOSPITAL 84 - D.ER | Age: 64
Discharge: HOME | End: 2019-02-13
Payer: MEDICARE

## 2019-02-13 VITALS
HEIGHT: 71 IN | HEIGHT: 71 IN | BODY MASS INDEX: 21.04 KG/M2 | BODY MASS INDEX: 21.04 KG/M2 | WEIGHT: 150.32 LBS | WEIGHT: 150.32 LBS

## 2019-02-13 VITALS — SYSTOLIC BLOOD PRESSURE: 119 MMHG | DIASTOLIC BLOOD PRESSURE: 64 MMHG

## 2019-02-13 DIAGNOSIS — Z98.890: ICD-10-CM

## 2019-02-13 DIAGNOSIS — E83.51: ICD-10-CM

## 2019-02-13 DIAGNOSIS — M10.072: Primary | ICD-10-CM

## 2019-02-13 LAB
ALBUMIN SERPL-MCNC: 3 G/DL (ref 3.4–5)
ALP SERPL-CCNC: 84 U/L (ref 46–116)
ALT SERPL-CCNC: 42 U/L (ref 10–68)
ANION GAP SERPL CALC-SCNC: 16.6 MMOL/L (ref 8–16)
BASOPHILS NFR BLD AUTO: 0.5 % (ref 0–2)
BILIRUB SERPL-MCNC: 0.69 MG/DL (ref 0.2–1.3)
BUN SERPL-MCNC: 11 MG/DL (ref 7–18)
CALCIUM SERPL-MCNC: 5 MG/DL (ref 8.5–10.1)
CHLORIDE SERPL-SCNC: 101 MMOL/L (ref 98–107)
CO2 SERPL-SCNC: 27.8 MMOL/L (ref 21–32)
CREAT SERPL-MCNC: 1 MG/DL (ref 0.6–1.3)
EOSINOPHIL NFR BLD: 0.5 % (ref 0–7)
ERYTHROCYTE [DISTWIDTH] IN BLOOD BY AUTOMATED COUNT: 16.7 % (ref 11.5–14.5)
GLOBULIN SER-MCNC: 4.7 G/L
GLUCOSE SERPL-MCNC: 108 MG/DL (ref 74–106)
HCT VFR BLD CALC: 33.9 % (ref 42–54)
HGB BLD-MCNC: 11.2 G/DL (ref 13.5–17.5)
IMM GRANULOCYTES NFR BLD: 0 % (ref 0–5)
LYMPHOCYTES NFR BLD AUTO: 12 % (ref 15–50)
MCH RBC QN AUTO: 32.1 PG (ref 26–34)
MCHC RBC AUTO-ENTMCNC: 33 G/DL (ref 31–37)
MCV RBC: 97.1 FL (ref 80–100)
MONOCYTES NFR BLD: 14.9 % (ref 2–11)
NEUTROPHILS NFR BLD AUTO: 72.1 % (ref 40–80)
OSMOLALITY SERPL CALC.SUM OF ELEC: 282 MOSM/KG (ref 275–300)
PLATELET # BLD: 235 10X3/UL (ref 130–400)
PMV BLD AUTO: 9.6 FL (ref 7.4–10.4)
POTASSIUM SERPL-SCNC: 3.4 MMOL/L (ref 3.5–5.1)
PROT SERPL-MCNC: 7.7 G/DL (ref 6.4–8.2)
RBC # BLD AUTO: 3.49 10X6/UL (ref 4.2–6.1)
SODIUM SERPL-SCNC: 142 MMOL/L (ref 136–145)
URATE SERPL-MCNC: 6.6 MG/DL (ref 2.6–7.2)
WBC # BLD AUTO: 6.2 10X3/UL (ref 4.8–10.8)

## 2019-03-25 ENCOUNTER — HOSPITAL ENCOUNTER (EMERGENCY)
Dept: HOSPITAL 84 - D.ER | Age: 64
Discharge: HOME | End: 2019-03-25
Payer: MEDICARE

## 2019-03-25 ENCOUNTER — HOSPITAL ENCOUNTER (INPATIENT)
Dept: HOSPITAL 84 - D.ER | Age: 64
LOS: 3 days | Discharge: HOME | DRG: 433 | End: 2019-03-28
Attending: INTERNAL MEDICINE | Admitting: INTERNAL MEDICINE
Payer: MEDICARE

## 2019-03-25 VITALS
WEIGHT: 140.29 LBS | WEIGHT: 140.29 LBS | BODY MASS INDEX: 19.64 KG/M2 | HEIGHT: 71 IN | BODY MASS INDEX: 19.64 KG/M2 | HEIGHT: 71 IN

## 2019-03-25 VITALS
BODY MASS INDEX: 23.19 KG/M2 | HEIGHT: 71 IN | HEIGHT: 71 IN | BODY MASS INDEX: 23.19 KG/M2 | BODY MASS INDEX: 23.19 KG/M2 | WEIGHT: 165.65 LBS | WEIGHT: 165.65 LBS

## 2019-03-25 VITALS — SYSTOLIC BLOOD PRESSURE: 159 MMHG | DIASTOLIC BLOOD PRESSURE: 80 MMHG

## 2019-03-25 DIAGNOSIS — M54.9: ICD-10-CM

## 2019-03-25 DIAGNOSIS — F17.213: ICD-10-CM

## 2019-03-25 DIAGNOSIS — G31.2: ICD-10-CM

## 2019-03-25 DIAGNOSIS — E83.51: ICD-10-CM

## 2019-03-25 DIAGNOSIS — E03.9: ICD-10-CM

## 2019-03-25 DIAGNOSIS — J98.11: ICD-10-CM

## 2019-03-25 DIAGNOSIS — F10.239: ICD-10-CM

## 2019-03-25 DIAGNOSIS — D50.9: ICD-10-CM

## 2019-03-25 DIAGNOSIS — N39.0: ICD-10-CM

## 2019-03-25 DIAGNOSIS — K70.11: Primary | ICD-10-CM

## 2019-03-25 DIAGNOSIS — N17.9: ICD-10-CM

## 2019-03-25 DIAGNOSIS — Z95.0: ICD-10-CM

## 2019-03-25 DIAGNOSIS — J44.9: ICD-10-CM

## 2019-03-25 DIAGNOSIS — A09: ICD-10-CM

## 2019-03-25 DIAGNOSIS — M54.5: Primary | ICD-10-CM

## 2019-03-25 DIAGNOSIS — R10.9: ICD-10-CM

## 2019-03-25 LAB
ALBUMIN SERPL-MCNC: 3.4 G/DL (ref 3.4–5)
ALP SERPL-CCNC: 468 U/L (ref 46–116)
ALT SERPL-CCNC: 1447 U/L (ref 10–68)
AMYLASE SERPL-CCNC: 43 U/L (ref 25–115)
ANION GAP SERPL CALC-SCNC: 29.9 MMOL/L (ref 8–16)
BILIRUB SERPL-MCNC: 4.24 MG/DL (ref 0.2–1.3)
BUN SERPL-MCNC: 32 MG/DL (ref 7–18)
CALCIUM SERPL-MCNC: 5.6 MG/DL (ref 8.5–10.1)
CHLORIDE SERPL-SCNC: 102 MMOL/L (ref 98–107)
CO2 SERPL-SCNC: 16.1 MMOL/L (ref 21–32)
CREAT SERPL-MCNC: 1.5 MG/DL (ref 0.6–1.3)
ERYTHROCYTE [DISTWIDTH] IN BLOOD BY AUTOMATED COUNT: 18.3 % (ref 11.5–14.5)
GLOBULIN SER-MCNC: 5.1 G/L
GLUCOSE SERPL-MCNC: 70 MG/DL (ref 74–106)
HCT VFR BLD CALC: 35.7 % (ref 42–54)
HGB BLD-MCNC: 11.5 G/DL (ref 13.5–17.5)
LIPASE SERPL-CCNC: 93 U/L (ref 73–393)
LYMPHOCYTES NFR BLD AUTO: 3 % (ref 15–50)
MCH RBC QN AUTO: 31.7 PG (ref 26–34)
MCHC RBC AUTO-ENTMCNC: 32.2 G/DL (ref 31–37)
MCV RBC: 98.3 FL (ref 80–100)
NEUTROPHILS NFR BLD AUTO: 97 % (ref 40–80)
OSMOLALITY SERPL CALC.SUM OF ELEC: 291 MOSM/KG (ref 275–300)
PLATELET # BLD EST: NORMAL 10*3/UL
PLATELET # BLD: 297 10X3/UL (ref 130–400)
PMV BLD AUTO: 10.4 FL (ref 7.4–10.4)
POTASSIUM SERPL-SCNC: 4 MMOL/L (ref 3.5–5.1)
PROT SERPL-MCNC: 8.5 G/DL (ref 6.4–8.2)
RBC # BLD AUTO: 3.63 10X6/UL (ref 4.2–6.1)
SODIUM SERPL-SCNC: 144 MMOL/L (ref 136–145)
WBC # BLD AUTO: 15.8 10X3/UL (ref 4.8–10.8)

## 2019-03-25 NOTE — NUR
CALLED TO PATIENTS ROOM, HE STATES HE IS HAVING ABD PAIN, PROVIDER NOTIFIED NO
NEW ORDERS GIVEN AT THIS TIME. CALL LIGHT WITHIN REACH

## 2019-03-26 VITALS — DIASTOLIC BLOOD PRESSURE: 76 MMHG | SYSTOLIC BLOOD PRESSURE: 122 MMHG

## 2019-03-26 VITALS — DIASTOLIC BLOOD PRESSURE: 86 MMHG | SYSTOLIC BLOOD PRESSURE: 141 MMHG

## 2019-03-26 VITALS — SYSTOLIC BLOOD PRESSURE: 131 MMHG | DIASTOLIC BLOOD PRESSURE: 80 MMHG

## 2019-03-26 VITALS — SYSTOLIC BLOOD PRESSURE: 145 MMHG | DIASTOLIC BLOOD PRESSURE: 79 MMHG

## 2019-03-26 VITALS — DIASTOLIC BLOOD PRESSURE: 78 MMHG | SYSTOLIC BLOOD PRESSURE: 145 MMHG

## 2019-03-26 VITALS — DIASTOLIC BLOOD PRESSURE: 74 MMHG | SYSTOLIC BLOOD PRESSURE: 138 MMHG

## 2019-03-26 VITALS — SYSTOLIC BLOOD PRESSURE: 125 MMHG | DIASTOLIC BLOOD PRESSURE: 71 MMHG

## 2019-03-26 VITALS — SYSTOLIC BLOOD PRESSURE: 142 MMHG | DIASTOLIC BLOOD PRESSURE: 87 MMHG

## 2019-03-26 VITALS — SYSTOLIC BLOOD PRESSURE: 133 MMHG | DIASTOLIC BLOOD PRESSURE: 71 MMHG

## 2019-03-26 LAB
APPEARANCE UR: (no result)
APTT BLD: 29.6 SECONDS (ref 22.8–39.4)
BACTERIA #/AREA URNS HPF: (no result) /HPF
BILIRUB SERPL-MCNC: (no result) MG/DL
COLOR UR: (no result)
GLUCOSE SERPL-MCNC: NEGATIVE MG/DL
GRAN CASTS #/AREA URNS LPF: (no result) /LPF
HYALINE CASTS #/AREA URNS LPF: (no result) /LPF
INR PPP: 1.78 (ref 0.85–1.17)
IRON SERPL-MCNC: 150 UG/DL (ref 35–150)
KETONES UR STRIP-MCNC: (no result) MG/DL
MAGNESIUM SERPL-MCNC: 1.3 MG/DL (ref 1.8–2.4)
MUCOUS THREADS #/AREA URNS LPF: (no result) /LPF
NITRITE UR-MCNC: NEGATIVE MG/ML
PH UR STRIP: 5 [PH] (ref 5–6)
PROT UR-MCNC: (no result) MG/DL
PROTHROMBIN TIME: 20.1 SECONDS (ref 11.6–15)
RBC #/AREA URNS HPF: (no result) /HPF (ref 0–5)
SAO2 % BLD FROM PO2: 66 % (ref 15–55)
SP GR UR STRIP: 1.02 (ref 1–1.02)
SQUAMOUS #/AREA URNS HPF: (no result) /HPF (ref 0–5)
TIBC SERPL-MCNC: 226 UG/DL (ref 260–445)
UIBC SERPL-MCNC: 76 UG/DL (ref 150–375)
UROBILINOGEN UR-MCNC: 1 MG/DL
WBC #/AREA URNS HPF: (no result) /HPF (ref 0–5)

## 2019-03-26 NOTE — NUR
PT ALERT.  STATES IS HAVING MORE BACK PAIN.  MORPHINE 4 AND ZOFRAN 4 GIVEN. 
LASIX ALSO GIVEN AT THIS TIME.  PT HAS URINAL AT BEDSIDE.

## 2019-03-26 NOTE — NUR
PT AWAKE AND REQUESTING BREATHING TREATMENT.  TAKES JENNIFFER NEB AT HOME FOR HIS
COPD.  WILL CALL R/T FOR TREATMENT.

## 2019-03-26 NOTE — NUR
PT HISTORY AND ASSESSMENT COMPLETED.  PT IS RESTLESS, VOICING MILD ANXIETY AT
THIS TIME.  PT ABLE TO PROVIDE SOME MEDICAL HISTORY.  UNABLE TO PROVIDE
MEDICATIONS CURRENTLY ON AND UNABLE TO IDENTIFY WHEN LAST DOSE OF MEDICATIONS
WERE TAKEN AT HOME.

## 2019-03-26 NOTE — NUR
RECIEVED BEDSIDE REPORT. ROUNDS COMPLETED. VSS, AAOX2. WITH A LITTLE
CONFUSION. PT STATES HE IS HURTING IN HIS LOWER BACK. WILL TREAT WITH 2100
MEDS. PT STATES HE IS SOB AT THIS TIME, PLACED PT ON 2L O2. 02 SAT AT 96 AT
THIS TIME. WILL CTM. CL IN REACH, BED IN LOW, SR UP X2.

## 2019-03-26 NOTE — NUR
PATIENT ARRIVED TO THE FLOOR VIA STRETCHER. FLAGYL RUNNING. MARISOL COMPLAINS
OF NAUSEA BUT IS WITHOUT VOMITING AT THIS TIME. PATIENT DENIES ANY NEEDS AT
THIS TIME. BED IS IN LOW POSITION AND CALL LIGHT IS IN REACH

## 2019-03-26 NOTE — MORECARE
CASE MANAGEMENT DISCHARGE SUMMARY
 
 
PATIENT: SHERI BLAKE                     UNIT: [id number]
ACCOUNT#: [id number]                       ADM DATE: 19
AGE: 64     : [birth year]  SEX: M            ROOM/BED: D.E08     
AUTHOR: ELKINDOC                             PHYSICIAN:                               
 
REFERRING PHYSICIAN: ANAY BYRD MD               
DATE OF SERVICE: 19
Discharge Plan
 
 
Patient Name: SHERI BLAKE
Facility: [facility]:[place]
Encounter #: [id number]
Medical Record #: [id number]
: 1955
Planned Disposition: Home
Anticipated Discharge Date: 3/28/19
 
Discharge Date: 
Expected LOS: 2
Initial Reviewer: [email or web address]
Initial Review Date: 2019
Generated: 3/26/19   3:13 pm 
DCP- Discharge Planning
 
Updated by [id number]: Jagruti Pillai on 3/26/19   1:10 pm CT
Patient Name: SHERI BLAKE                                    
Admission Status: ER  
Accout number: [id number]                             
Admission Date: 2019  
: 1955                                                       
Admission Diagnosis:  
Attending: ANAY BYRD                                               
Current LOS:  1  
 
Anticipated DC Date: 2019  
Planned Disposition: Home  
Primary Insurance: MEDICARE A & B  
 
 
Discharge Planning Comments:  
 
CM met with patient to complete initial dc planning assessment.  CM educated 
patient on the CM role and verbal consent given by patient to complete 
assessment.   Patient lives at home alone in an apartment building with 
elevator access.  At discharge patient plans to return home alone and feels 
this is a safe discharge. He reports he has a house keeper to assist with 
 
house cleaning.  CM discussed availability of home health, rehab services, 
and medical equipment. Patient stated he may want home health at time of dc 
explained that he will have to have a PCP to sign his home health orders.  He 
verbalized understanding.  If Dr. Byrd follows up with patient he may sign 
HH orders if HH is needed at time of dc. CM will continue to follow and will 
assist as needed with dc plans/needs.   
 
: Jagruti Pillai RN, [facility]
 DCPIA - Discharge Planning Initial Assessment
 
Updated by [email or web address]: Jagruti Pillai on 3/26/19   2:07 pm
*  Is the patient Alert and Oriented?
Yes
*  How many steps to enter\exit or inside your home? elevator *  PCP Does not have a PCP * 
Pharmacy
[facility] [address]
*  Preadmission Environment
Home Alone
*  ADLs
Independent
*  Equipment
Oxygen
*  List name and contact numbers for known caregivers / representatives who 
currently or will assist patient after discharge:
Paula Benedict - [facility] [phone]
*  Verbal permission to speak to the caregivers and representatives has been 
obtained from the patient.
Yes
*  Community resources currently utilized
None
*  Additional services required to return to the preadmission environment?
Yes
*  Can the patient safely return to the preadmission environment?
Yes
*  Has this patient been hospitalized within the prior 30 days at any 
hospital?
Yes
 
 
 
 
 
 
Patient Name: SHERI BLAKE
 
Encounter #: [id number]
Page [id number]
 
 
 
 
 
Electronically Signed by MERVAT GRANGER on 19 at 1413
 
 
 
 
 
 
**All edits/amendments must be made on the electronic document**
 
DICTATION DATE: 19     : CHANDRAKANT  19     
RPT#: [id number]                                DC DATE:        
                                               STATUS: ADM IN  
[facility]
[address] [place] [address]
***END OF REPORT***

## 2019-03-27 VITALS — SYSTOLIC BLOOD PRESSURE: 128 MMHG | DIASTOLIC BLOOD PRESSURE: 81 MMHG

## 2019-03-27 VITALS — SYSTOLIC BLOOD PRESSURE: 126 MMHG | DIASTOLIC BLOOD PRESSURE: 79 MMHG

## 2019-03-27 VITALS — SYSTOLIC BLOOD PRESSURE: 120 MMHG | DIASTOLIC BLOOD PRESSURE: 78 MMHG

## 2019-03-27 VITALS — DIASTOLIC BLOOD PRESSURE: 80 MMHG | SYSTOLIC BLOOD PRESSURE: 126 MMHG

## 2019-03-27 VITALS — DIASTOLIC BLOOD PRESSURE: 67 MMHG | SYSTOLIC BLOOD PRESSURE: 141 MMHG

## 2019-03-27 VITALS — DIASTOLIC BLOOD PRESSURE: 80 MMHG | SYSTOLIC BLOOD PRESSURE: 122 MMHG

## 2019-03-27 LAB
ALBUMIN SERPL-MCNC: 2.6 G/DL (ref 3.4–5)
ALP SERPL-CCNC: 242 U/L (ref 46–116)
ALT SERPL-CCNC: 816 U/L (ref 10–68)
ANION GAP SERPL CALC-SCNC: 16.8 MMOL/L (ref 8–16)
BASOPHILS NFR BLD AUTO: 0.2 % (ref 0–2)
BILIRUB SERPL-MCNC: 1.08 MG/DL (ref 0.2–1.3)
BUN SERPL-MCNC: 44 MG/DL (ref 7–18)
CALCIUM SERPL-MCNC: < 5 MG/DL (ref 8.5–10.1)
CHLORIDE SERPL-SCNC: 107 MMOL/L (ref 98–107)
CO2 SERPL-SCNC: 25.9 MMOL/L (ref 21–32)
CREAT SERPL-MCNC: 1.7 MG/DL (ref 0.6–1.3)
EOSINOPHIL NFR BLD: 0.2 % (ref 0–7)
ERYTHROCYTE [DISTWIDTH] IN BLOOD BY AUTOMATED COUNT: 18 % (ref 11.5–14.5)
FOLATE SERPL-MCNC: >20 NG/ML (ref 3–?)
GLOBULIN SER-MCNC: 3.9 G/L
GLUCOSE SERPL-MCNC: 109 MG/DL (ref 74–106)
HCT VFR BLD CALC: 32.6 % (ref 42–54)
HCV AB S/CO SERPL IA: <0.1 S/CO RAT (ref 0–0.9)
HGB BLD-MCNC: 10.5 G/DL (ref 13.5–17.5)
IMM GRANULOCYTES NFR BLD: 1.3 % (ref 0–5)
LYMPHOCYTES NFR BLD AUTO: 7 % (ref 15–50)
MCH RBC QN AUTO: 30.6 PG (ref 26–34)
MCHC RBC AUTO-ENTMCNC: 32.2 G/DL (ref 31–37)
MCV RBC: 95 FL (ref 80–100)
MONOCYTES NFR BLD: 5.8 % (ref 2–11)
NEUTROPHILS NFR BLD AUTO: 85.5 % (ref 40–80)
OSMOLALITY SERPL CALC.SUM OF ELEC: 302 MOSM/KG (ref 275–300)
PLATELET # BLD: 275 10X3/UL (ref 130–400)
PMV BLD AUTO: 10 FL (ref 7.4–10.4)
POTASSIUM SERPL-SCNC: 3.7 MMOL/L (ref 3.5–5.1)
PROT SERPL-MCNC: 6.5 G/DL (ref 6.4–8.2)
RBC # BLD AUTO: 3.43 10X6/UL (ref 4.2–6.1)
SODIUM SERPL-SCNC: 146 MMOL/L (ref 136–145)
VIT B12 SERPL-MCNC: >2000 PG/ML (ref 232–1245)
WBC # BLD AUTO: 11.6 10X3/UL (ref 4.8–10.8)

## 2019-03-27 NOTE — NUR
PT STATES HE IS STILL FEELING PAIN ON HIS BACK. NEXT DOSE OF PAIN PILL DUE AT
0300. TYLENOL ADMINISTERED AT THIS TIME. PT ALSO STATES HE FEELS LIKE "PEEING"
BUT NOT IS COMING OUT. PERFORMED A QUICK IN OUT, BUT NO URINE IN THE BLADDER.
WILL CTM. CL IN REACH, BED IN LOW, SR UP X2.

## 2019-03-27 NOTE — NUR
PT IN 2133 STATES THAT THE PT IN 2132 WAS ENTERING HER ROOM IN HIS BRIEFS
ASKING FOR A CIGARETTE. REINSTRUCTED PT BACK TO ROOM WHERE HE IS LYING SEMI
FOLWERS. CALL LIGHT W/I REACH. REORIENTED PT. NOTIFIED PHYSICIAN WHO DID NOT
GIVE ANY PARTICULAR INSTRUCTION. WILL CTM.

## 2019-03-27 NOTE — NUR
RESUMED CARE FROM [facility]. PT HAS CLOTHES ON WITH BAG PACKED. STATES, "IM
GOING HOME. I'M AFRAID SOMEONE IS GOING TO STEAL MY TRUCK." ASKED PT TO STAY
WITH US TONIGHT SO HE CAN TALK TO  IN AM. PT AGREED. PT COMPLAINS OF PAIN
IN BACK PRN PAIN MEDICATION GIVEN. SEE MAR. PT IN BED ALERT AND NO S/S OF
DISTRESS. WILL CONTINUE TO MONITOR.

## 2019-03-27 NOTE — NUR
REPORT RECEIVED. WILL CONTINUE WITH POC. PT IS AAO AND UP WITH ASSIST. RR EVEN
AND UNLABORED ON RA. R.AC PIV IS SALINE LOCKED. CRITICAL CALCIUM LESS THAN
5.0. NOTIFIED TIMUR GERARDO WHO ORDERED CALCITROL 0.25MCG BID. NO S/S DISTRESS
NOTED. WILL CTM.

## 2019-03-27 NOTE — NUR
LEVAQUIN WAS INFUSING AND ENTERED THE ROOM TO FIND PT TRYING TO GET OUT OF BED
WITH PT STATING "ALYX GOT TO GET HOME, I NEED TO GET INTO MY ROOM AND GET ON
THE COUCH." PT IS ORIENTED TO PLACE AND PERSON BUT IS CONFUSED TO SITUATION.
REORIENTED PT AND REAPPLIED BED ALARM. PT REMOVED PIV WHILE LEVAQUIN WAS
INFUSING. STOPPED INFUSION R/T PT NONCOMPLIANCE. PIV IS STILL IN PLACE. WILL
CTM.

## 2019-03-27 NOTE — NUR
PT CALLED [place] [employer] SEVERAL TIMES STATING "I HAVE BEEN ABDUCTED." PT IS
COMBATIVE AND REFUSES ANY MEDICATIONS. PT YELLING INTO HALLWAY "GET THE POLICE
AND TAKE ME HOME. IM IN THE WRONG ROOM." CALLED TIMUR GERARDO WHO ORDERED 10MG OF
GEODON IM. PT REFUSED THE SHOT. GATHERED HELP AND PT RECEIVED THE GEODON SHOT.
PT ALSO STATED THAT HE WANTED TO SIGN THE FORM STATING HE WOULD BE LIABLE IF
HE WERE TO FALL FROM BED. PT DID SIGN FORM AND IT WAS PLACED IN SHOT. PT IS
NOW LYING SEMI FOWLERS. CALL LIGHT W/I REACH. WILL CTM.

## 2019-03-28 VITALS — SYSTOLIC BLOOD PRESSURE: 157 MMHG | DIASTOLIC BLOOD PRESSURE: 70 MMHG

## 2019-03-28 VITALS — SYSTOLIC BLOOD PRESSURE: 144 MMHG | DIASTOLIC BLOOD PRESSURE: 76 MMHG

## 2019-03-28 VITALS — DIASTOLIC BLOOD PRESSURE: 61 MMHG | SYSTOLIC BLOOD PRESSURE: 128 MMHG

## 2019-03-28 LAB
ALBUMIN SERPL-MCNC: 2.9 G/DL (ref 3.4–5)
ALP SERPL-CCNC: 217 U/L (ref 46–116)
ALT SERPL-CCNC: 704 U/L (ref 10–68)
AMYLASE SERPL-CCNC: 31 U/L (ref 25–115)
ANION GAP SERPL CALC-SCNC: 20 MMOL/L (ref 8–16)
BASOPHILS NFR BLD AUTO: 0.1 % (ref 0–2)
BILIRUB SERPL-MCNC: 1.14 MG/DL (ref 0.2–1.3)
BUN SERPL-MCNC: 46 MG/DL (ref 7–18)
CALCIUM SERPL-MCNC: < 5 MG/DL (ref 8.5–10.1)
CHLORIDE SERPL-SCNC: 103 MMOL/L (ref 98–107)
CHOLEST/HDLC SERPL: 2.6 RATIO (ref 2.3–4.9)
CO2 SERPL-SCNC: 24.5 MMOL/L (ref 21–32)
CREAT SERPL-MCNC: 1.8 MG/DL (ref 0.6–1.3)
EOSINOPHIL NFR BLD: 0.4 % (ref 0–7)
ERYTHROCYTE [DISTWIDTH] IN BLOOD BY AUTOMATED COUNT: 18.4 % (ref 11.5–14.5)
GLOBULIN SER-MCNC: 4.2 G/L
GLUCOSE SERPL-MCNC: 129 MG/DL (ref 74–106)
HCT VFR BLD CALC: 31.1 % (ref 42–54)
HDLC SERPL-MCNC: 62 MG/DL (ref 32–96)
HGB BLD-MCNC: 10.2 G/DL (ref 13.5–17.5)
IMM GRANULOCYTES NFR BLD: 0.6 % (ref 0–5)
INR PPP: 1.5 (ref 0.85–1.17)
LDL-HDL RATIO: 1.3 RATIO (ref 1.5–3.5)
LDLC SERPL-MCNC: 82 MG/DL (ref 0–100)
LIPASE SERPL-CCNC: 68 U/L (ref 73–393)
LYMPHOCYTES NFR BLD AUTO: 9 % (ref 15–50)
MAGNESIUM SERPL-MCNC: 1.7 MG/DL (ref 1.8–2.4)
MCH RBC QN AUTO: 31.5 PG (ref 26–34)
MCHC RBC AUTO-ENTMCNC: 32.8 G/DL (ref 31–37)
MCV RBC: 96 FL (ref 80–100)
MONOCYTES NFR BLD: 6.7 % (ref 2–11)
NEUTROPHILS NFR BLD AUTO: 83.2 % (ref 40–80)
OSMOLALITY SERPL CALC.SUM OF ELEC: 300 MOSM/KG (ref 275–300)
PLATELET # BLD: 220 10X3/UL (ref 130–400)
PMV BLD AUTO: 10.1 FL (ref 7.4–10.4)
POTASSIUM SERPL-SCNC: 3.5 MMOL/L (ref 3.5–5.1)
PROT SERPL-MCNC: 7.1 G/DL (ref 6.4–8.2)
PROTHROMBIN TIME: 17.5 SECONDS (ref 11.6–15)
RBC # BLD AUTO: 3.24 10X6/UL (ref 4.2–6.1)
SODIUM SERPL-SCNC: 144 MMOL/L (ref 136–145)
TRIGL SERPL-MCNC: 75 MG/DL (ref 30–200)
WBC # BLD AUTO: 8.2 10X3/UL (ref 4.8–10.8)

## 2019-03-28 NOTE — NUR
SPOKE WITH PT'S SISTER ABOUT PT DISCHARGING, PT'S SISTER STATED TO CALL MJ
ABOUT PICKING HIM UP. MJ STATED SHE IS AT WORK AND TO CALL A CAB FOR PT.
PT'S SISTER SAID THE SAME THING IF MJ COULDNT  THEN TO HAVE HIM
CALL A TAXI TO GET HIM.

## 2019-03-28 NOTE — MORECARE
CASE MANAGEMENT DISCHARGE SUMMARY
 
 
PATIENT: SHERI BLAKE                     UNIT: [id number]
ACCOUNT#: [id number]                       ADM DATE: 19
AGE: 64     : 53  SEX: M            ROOM/BED: D.2132    
AUTHOR: ELKINDOC                             PHYSICIAN:                               
 
REFERRING PHYSICIAN: ANAY BYRD MD               
DATE OF SERVICE: 19
Discharge Plan
 
 
Patient Name: SHERI BLAKE
Facility: [facility]:[place]
Encounter #: [id number]
Medical Record #: [id number]
: 1955
Planned Disposition: Home
Anticipated Discharge Date: 3/28/19
 
Discharge Date: 2019
Expected LOS: 2
Initial Reviewer: [email or web address]
Initial Review Date: 2019
Generated: 3/28/19   4:08 pm 
Comments
 
DCP- Discharge Planning
 
Updated by [email or web address]: Vasiliy Saucedo on 3/28/19   2:04 pm CT
Patient Name:  SHERI BLAKE   
Encounter No:  [id number]   
:  1955   
Primary Insurance:  MEDICARE A & B  
Anticipated DC Date: 2019   
Planned Disposition:  Home  
  
  
DCP follow-up note: CM MET WITH PT AT [facility], PT DEMANDING TO LEAVE 
HOSPITAL NOW.  CM DISCUSSED DISCHARGE NEEDS AND PLANNING.  CM DISCUSSED 
AVAILABILITY OF HOME HEALTH, REHAB SERVICES AND MEDICAL EQUIPMENT.  PT DENIES 
DISCHARGE NEEDS.  PT DENIES NEED OF REHAB INFORMATION OR PLACEMENT; PT STATES 
HE TOOK HIS LAST SHOT OF ALCOHOL THREE DAYS AGO AND HAS NO MORE AT HOME.  PT 
PLEDGED TO DRINK NO MORE.  PT WILL CALL TAXI TO TRANSPORT HOME AT DISCHARGE.  
 IMPORTANT MESSAGE FROM MEDICARE PROVIDED AND EXPLAINED.    
  
TANYA Stacy
DCP- Discharge Planning
 
 
Updated by [id number]: Jagruti Pillai on 3/26/19   1:10 pm CT
Patient Name: SHERI BLAKE                                    
Admission Status: ER  
Accout number: [id number]                             
Admission Date: 2019  
: 1955                                                       
Admission Diagnosis:  
Attending: ANAY BYRD                                               
Current LOS:  1  
 
Anticipated DC Date: 2019  
Planned Disposition: Home  
Primary Insurance: MEDICARE A & B  
 
 
Discharge Planning Comments:  
 
CM met with patient to complete initial dc planning assessment.  CM educated 
patient on the CM role and verbal consent given by patient to complete 
assessment.   Patient lives at home alone in an apartment building with 
elevator access.  At discharge patient plans to return home alone and feels 
this is a safe discharge. He reports he has a house keeper to assist with 
house cleaning.  CM discussed availability of home health, rehab services, 
and medical equipment. Patient stated he may want home health at time of dc 
explained that he will have to have a PCP to sign his home health orders.  He 
verbalized understanding.  If Dr. Byrd follows up with patient he may sign 
HH orders if HH is needed at time of dc. CM will continue to follow and will 
assist as needed with dc plans/needs.   
 
: Jagruti Pillai RN, [facility]
 DCPIA - Discharge Planning Initial Assessment
 
Updated by [email or web address]: Jagruti Pillai on 3/26/19   2:07 pm
*  Is the patient Alert and Oriented?
Yes
*  How many steps to enter\exit or inside your home? elevator *  PCP Does not have a PCP * 
Pharmacy
[facility] [address]
*  Preadmission Environment
Home Alone
*  ADLs
Independent
*  Equipment
Oxygen
*  List name and contact numbers for known caregivers / representatives who 
currently or will assist patient after discharge:
Paula Hazeller [facility] [phone]
*  Verbal permission to speak to the caregivers and representatives has been 
obtained from the patient.
Yes
*  Community resources currently utilized
None
 
*  Additional services required to return to the preadmission environment?
Yes
*  Can the patient safely return to the preadmission environment?
Yes
*  Has this patient been hospitalized within the prior 30 days at any 
hospital?
Yes
 
 
 
 
 
Coverage Notice
 
Reviewer: [email or web address] Eduardo Saucedo
 
Notice Issued Date-Time: 2019  12:10
Notice Type: IM Discharge Notice
 
Notice Delivered To: Patient
Relationship to Patient: 
Representative Name: 
 
Delivery Method: HAND - Hand Delivered
Rahel Days:
Prior Verbal Notification: 
 
Recipient Understood Notice: Yes
Recipient Signature: Yes
Med Rec Note Co-signed by Attending:
 
Coverage Notice Comment:  
 
Last DP export: 3/26/19   1:13 p
Patient Name: SHERI BLAKE
 
Encounter #: [id number]
Page [phone]
 
 
 
 
 
Electronically Signed by MERVAT GRANGER on 19 at 1508
 
 
 
 
 
 
**All edits/amendments must be made on the electronic document**
 
DICTATION DATE: 19 1504     : CHANDRAKANT  19 1508     
RPT#: [id number]                                DC DATE:19
                                               STATUS: DIS IN  
[facility]
[address] [place] [address]
***END OF REPORT***

## 2019-03-28 NOTE — NUR
PT WALKING AROUND IN HALLWAY LOOKING FOR HIS 55INCH TV. HELPED PT BACK TO
ROOM. PT IN BED. BED LOW CALL LIGHT WITHIN REACH. SAIRA;L CONTINUE TO MONITOR.

## 2019-03-28 NOTE — NUR
REPORT RECIUEVED AND MORNING ROUNDING COMPLETE. PT SITTING IN CHAIR STATING HE
WOULD LIKE TO GO HOME AND GET HIS TRUCK. PT STATES NO NEEDS AT THIS TIME CALL
LIGHT WITHIN REACH.

## 2019-03-28 NOTE — NUR
PT RESTING IN BED ALERT. RR EVEN AND UNLABORED. PT COMPLAINED OF PAIN IN
SPINE. PRN PAIN MED GIVEN. NO S/S OF DISTRESS. BED LOW CALL LIGHT WITHIN
REACH. WILL CONTINUE TO MONITOR.

## 2019-03-28 NOTE — NUR
PT STATES HE IS LEAVING ROOM TO GO SMOKE A CIGERETTE. EXPLAINED TO PT THAT
SMOKING IS NOT ALLOWED INSIDE OR OUTSIDE HOSPITAL. PT STATED, "LET ME SIGN
WHATEVER PAPERS I NEED TO SO I CAN GO GET CIGERRETS AND MY TRUCK." PT GAIT
UNSTABLE AND PT CONFUSED ABOUT SITIUATION. DR. LEON AND HOUSE SUPERVISOR
NOTIFIED. CALLED SISTER ISAIAS NO ANSWER. WING RN SPOKE WITH PT AND HE NOW
AGREES TO STAY. BED LOW CALL LIGHT WITHIN REACH. WILL CONTINUE TO MONITOR.

## 2019-03-28 NOTE — NUR
PT UNHOOKED IV FROM ARM AND FLUIDS WERE ON THE FLOOR. PT WOULD NOT LET ME
REHOOK HIM BACK UP TO IV. HE RECIEVED ABOUT HALF OF THE IV THERAPY BAGS. PT
STATES HE WILL NOT BE HOOKED BACK UP TODAY.

## 2019-03-28 NOTE — NUR
PT WONDERED OUT INTO HALLWAY LOOKING FOR BATHROOM. REORIENTED PT TO ROOM AND
BATHEROOM. WILL CONTINUE TO MONITOR.

## 2019-03-28 NOTE — NUR
REMOVED PT'S RIGHT PIV. NO BLEEDING NOTED. PRESSURE HELD FOR 2 MIN. PIV CATH
INTACT. PT STATED HE UNDERSTOOD ALL DISCHARGE INSTRUCTIONS. NO OTHER NEEDS AT
THIS TIME. VOLUNTEER TOOK PT DOWN TO FRONT DOOR. PT CALLED A TAXI TO PICK HIM
UP.

## 2019-04-03 ENCOUNTER — HOSPITAL ENCOUNTER (EMERGENCY)
Dept: HOSPITAL 84 - D.ER | Age: 64
Discharge: LEFT BEFORE DISCHARGE | End: 2019-04-03
Payer: MEDICARE

## 2019-04-03 VITALS
BODY MASS INDEX: 19.64 KG/M2 | HEIGHT: 71 IN | HEIGHT: 71 IN | BODY MASS INDEX: 19.64 KG/M2 | WEIGHT: 140.29 LBS | WEIGHT: 140.29 LBS

## 2019-04-03 VITALS — DIASTOLIC BLOOD PRESSURE: 86 MMHG | SYSTOLIC BLOOD PRESSURE: 147 MMHG

## 2019-04-03 DIAGNOSIS — K29.20: Primary | ICD-10-CM

## 2019-04-03 DIAGNOSIS — F10.10: ICD-10-CM

## 2019-04-03 LAB
ALBUMIN SERPL-MCNC: 2.5 G/DL (ref 3.4–5)
ALP SERPL-CCNC: 173 U/L (ref 46–116)
ALT SERPL-CCNC: 176 U/L (ref 10–68)
AMYLASE SERPL-CCNC: 49 U/L (ref 25–115)
ANION GAP SERPL CALC-SCNC: 16.5 MMOL/L (ref 8–16)
BASOPHILS NFR BLD AUTO: 0.1 % (ref 0–2)
BILIRUB SERPL-MCNC: 1 MG/DL (ref 0.2–1.3)
BUN SERPL-MCNC: 15 MG/DL (ref 7–18)
CALCIUM SERPL-MCNC: 4.2 MG/DL (ref 8.5–10.1)
CHLORIDE SERPL-SCNC: 106 MMOL/L (ref 98–107)
CO2 SERPL-SCNC: 25.5 MMOL/L (ref 21–32)
CREAT SERPL-MCNC: 1 MG/DL (ref 0.6–1.3)
EOSINOPHIL NFR BLD: 0.7 % (ref 0–7)
ERYTHROCYTE [DISTWIDTH] IN BLOOD BY AUTOMATED COUNT: 19.4 % (ref 11.5–14.5)
GLOBULIN SER-MCNC: 4 G/L
GLUCOSE SERPL-MCNC: 118 MG/DL (ref 74–106)
HCT VFR BLD CALC: 31.4 % (ref 42–54)
HGB BLD-MCNC: 10.2 G/DL (ref 13.5–17.5)
IMM GRANULOCYTES NFR BLD: 0.4 % (ref 0–5)
LIPASE SERPL-CCNC: 161 U/L (ref 73–393)
LYMPHOCYTES NFR BLD AUTO: 8.3 % (ref 15–50)
MCH RBC QN AUTO: 30.8 PG (ref 26–34)
MCHC RBC AUTO-ENTMCNC: 32.5 G/DL (ref 31–37)
MCV RBC: 94.9 FL (ref 80–100)
MONOCYTES NFR BLD: 9.8 % (ref 2–11)
NEUTROPHILS NFR BLD AUTO: 80.7 % (ref 40–80)
OSMOLALITY SERPL CALC.SUM OF ELEC: 290 MOSM/KG (ref 275–300)
PLATELET # BLD: 150 10X3/UL (ref 130–400)
PMV BLD AUTO: 10.4 FL (ref 7.4–10.4)
POTASSIUM SERPL-SCNC: 3 MMOL/L (ref 3.5–5.1)
PROT SERPL-MCNC: 6.5 G/DL (ref 6.4–8.2)
RBC # BLD AUTO: 3.31 10X6/UL (ref 4.2–6.1)
SODIUM SERPL-SCNC: 145 MMOL/L (ref 136–145)
TROPONIN I SERPL-MCNC: 0.15 NG/ML (ref 0–0.06)
WBC # BLD AUTO: 9.6 10X3/UL (ref 4.8–10.8)

## 2019-04-06 ENCOUNTER — HOSPITAL ENCOUNTER (INPATIENT)
Dept: HOSPITAL 84 - D.ER | Age: 64
LOS: 4 days | Discharge: NURSING, REHAB OR LONG-TERM CARE | DRG: 432 | End: 2019-04-10
Attending: FAMILY MEDICINE | Admitting: FAMILY MEDICINE
Payer: MEDICARE

## 2019-04-06 VITALS
WEIGHT: 145.3 LBS | HEIGHT: 71 IN | HEIGHT: 71 IN | WEIGHT: 145.3 LBS | BODY MASS INDEX: 20.34 KG/M2 | BODY MASS INDEX: 20.34 KG/M2 | BODY MASS INDEX: 20.34 KG/M2

## 2019-04-06 VITALS — DIASTOLIC BLOOD PRESSURE: 90 MMHG | SYSTOLIC BLOOD PRESSURE: 154 MMHG

## 2019-04-06 VITALS — DIASTOLIC BLOOD PRESSURE: 99 MMHG | SYSTOLIC BLOOD PRESSURE: 154 MMHG

## 2019-04-06 DIAGNOSIS — D50.9: ICD-10-CM

## 2019-04-06 DIAGNOSIS — F17.213: ICD-10-CM

## 2019-04-06 DIAGNOSIS — I50.31: ICD-10-CM

## 2019-04-06 DIAGNOSIS — J44.9: ICD-10-CM

## 2019-04-06 DIAGNOSIS — F10.239: ICD-10-CM

## 2019-04-06 DIAGNOSIS — K70.31: Primary | ICD-10-CM

## 2019-04-06 DIAGNOSIS — I44.2: ICD-10-CM

## 2019-04-06 DIAGNOSIS — D68.9: ICD-10-CM

## 2019-04-06 DIAGNOSIS — R04.2: ICD-10-CM

## 2019-04-06 DIAGNOSIS — E03.9: ICD-10-CM

## 2019-04-06 DIAGNOSIS — N17.9: ICD-10-CM

## 2019-04-06 DIAGNOSIS — Z95.0: ICD-10-CM

## 2019-04-06 DIAGNOSIS — E83.42: ICD-10-CM

## 2019-04-06 DIAGNOSIS — E83.51: ICD-10-CM

## 2019-04-06 LAB
ALBUMIN SERPL-MCNC: 2.8 G/DL (ref 3.4–5)
ALP SERPL-CCNC: 219 U/L (ref 46–116)
ALT SERPL-CCNC: 100 U/L (ref 10–68)
AMYLASE SERPL-CCNC: 39 U/L (ref 25–115)
ANION GAP SERPL CALC-SCNC: 20.4 MMOL/L (ref 8–16)
BASOPHILS NFR BLD AUTO: 0.3 % (ref 0–2)
BILIRUB SERPL-MCNC: 1.56 MG/DL (ref 0.2–1.3)
BUN SERPL-MCNC: 21 MG/DL (ref 7–18)
CALCIUM SERPL-MCNC: 4.2 MG/DL (ref 8.5–10.1)
CHLORIDE SERPL-SCNC: 103 MMOL/L (ref 98–107)
CO2 SERPL-SCNC: 23.7 MMOL/L (ref 21–32)
CREAT SERPL-MCNC: 1.2 MG/DL (ref 0.6–1.3)
EOSINOPHIL NFR BLD: 0.6 % (ref 0–7)
ERYTHROCYTE [DISTWIDTH] IN BLOOD BY AUTOMATED COUNT: 19.5 % (ref 11.5–14.5)
ETHANOL SERPL-MCNC: 3 MG/DL (ref 0–10)
GLOBULIN SER-MCNC: 4.6 G/L
GLUCOSE SERPL-MCNC: 93 MG/DL (ref 74–106)
HCT VFR BLD CALC: 36.8 % (ref 42–54)
HGB BLD-MCNC: 11.8 G/DL (ref 13.5–17.5)
IMM GRANULOCYTES NFR BLD: 0.6 % (ref 0–5)
LIPASE SERPL-CCNC: 103 U/L (ref 73–393)
LYMPHOCYTES NFR BLD AUTO: 8.2 % (ref 15–50)
MCH RBC QN AUTO: 31.3 PG (ref 26–34)
MCHC RBC AUTO-ENTMCNC: 32.1 G/DL (ref 31–37)
MCV RBC: 97.6 FL (ref 80–100)
MONOCYTES NFR BLD: 6.1 % (ref 2–11)
NEUTROPHILS NFR BLD AUTO: 84.2 % (ref 40–80)
OSMOLALITY SERPL CALC.SUM OF ELEC: 289 MOSM/KG (ref 275–300)
PLATELET # BLD: 164 10X3/UL (ref 130–400)
PMV BLD AUTO: 11.3 FL (ref 7.4–10.4)
POTASSIUM SERPL-SCNC: 3.1 MMOL/L (ref 3.5–5.1)
PROT SERPL-MCNC: 7.4 G/DL (ref 6.4–8.2)
RBC # BLD AUTO: 3.77 10X6/UL (ref 4.2–6.1)
SODIUM SERPL-SCNC: 144 MMOL/L (ref 136–145)
TROPONIN I SERPL-MCNC: 0.17 NG/ML (ref 0–0.06)
WBC # BLD AUTO: 10.9 10X3/UL (ref 4.8–10.8)

## 2019-04-06 NOTE — NUR
BANANA BAG MED ORDER VERIFIED WITH EDP EZEQUIEL, ONLY INFUSE 1 GRAM MAGNESIUM IN
WITH BANANA BAG AS OPPOSED TO ORDERED 2 GRAMS.

## 2019-04-06 NOTE — EC
PATIENT:SHERI BLAKE                 DATE OF SERVICE: 04/06/19
SEX: M                                  MEDICAL RECORD: [id number]
DATE OF BIRTH: [birth year]                        LOCATION:D.M2      D.212
AGE OF PATIENT: 64                             ADMISSION DATE: 04/06/19
 
REFERRING PHYSICIAN:                               
 
INTERPRETING PHYSICIAN: OMAR GUARDADO MD          
 
 
 
                             ECHOCARDIOGRAM REPORT
  ECHO CHARGES 4               ECHO COMPLETE                 Date: 04/08/19
 
 
 
CLINICAL DIAGNOSIS: CHF                           
 
                         ECHOCARDIOGRAPHIC MEASUREMENTS
      (adult normal given)
   AC root (d.<3.7cm) 31   cm   LV Septum d (<1.2 cm> 0.7  cm
      Valve Excursion 1.1  cm     LV Septum (systole) 0.9  cm
Left Atria (s.<4.0cm> 2.9  cm          LVPW d(<1.2cm) 1.4  cm
        RV (d.<2.3cm) 3.2  cm           LVPW (sytole) 1.7  cm
  LV diastole(<5.6CM) 5.8  cm       MV E-F(>70mm/sec)      cm
           LV systole 4.8  cm           LVOT Diameter 1.8  cm
       MV exc.(>10mm)      cm
Est.ejection fraction (50-75%)     %
 
   DOPPLER:
     LVIT      cm/sec A 57   cm/sec E 108   cm/sec
       LA      cm/sec      RVSP 46.2 mmHg
     LVOT 82   cm/sec   AOP1/2T      m/s
  Asc. Ao 115  cm/sec
     RVOT 43   cm/sec
       RA      cm/sec
       PA 60   cm/sec
 AV Gradient Peak 5.3  mmHg  AV Mean 3.3  mmHg  AV Area 1.8  cm
 MV Gradient Peak 6.9  mmHg  MV Mean 3.3  mmHg  MV Area      cm
   COMMENTS:                                              
 
 
 Cardiac Sonographer: Enoch CLAYTONICA ELENO             
      Cardiologist: 3          Dr. Hutson             
             TAPE# PACS           
                                       Pericardial Effusion N                        
 
 
DATE OF SERVICE:  
 
Adequate 2-D, color-flow and spectral Doppler, and M-mode.
 
No LVH.  LV internal dimensions are normal.  LV is mildly globally hypo.  Aortic
valve sclerosis without stenosis by Doppler interrogation.  Left atrium is
normal.  Mitral valve shows no prolapse.  Severe MR.  Right-sided chambers are
normal.  Moderate TR.
 
TRANSINT:[id number] Voice Confirmation ID: [id number] DOCUMENT ID: [id number]
 
 
 
ECHOCARDIOGRAM REPORT                          [id number]    SHERI BLAKE         
 
 
                                           
                                           OMAR GUARDADO MD          
 
 
 
 
 
 
 
 
 
 
 
 
 
 
 
 
 
 
 
 
 
 
 
 
 
 
 
 
 
 
 
 
 
 
 
 
 
 
 
 
 
 
 
 
CC:                                                             [id number]
DICTATION DATE: 04/09/19 1316     :     04/09/19 1435      ADM IN  
                                                                              
[facility]                                          
[address] [id number]

## 2019-04-07 VITALS — SYSTOLIC BLOOD PRESSURE: 152 MMHG | DIASTOLIC BLOOD PRESSURE: 78 MMHG

## 2019-04-07 VITALS — DIASTOLIC BLOOD PRESSURE: 82 MMHG | SYSTOLIC BLOOD PRESSURE: 143 MMHG

## 2019-04-07 VITALS — SYSTOLIC BLOOD PRESSURE: 150 MMHG | DIASTOLIC BLOOD PRESSURE: 87 MMHG

## 2019-04-07 VITALS — DIASTOLIC BLOOD PRESSURE: 43 MMHG | SYSTOLIC BLOOD PRESSURE: 103 MMHG

## 2019-04-07 VITALS — SYSTOLIC BLOOD PRESSURE: 117 MMHG | DIASTOLIC BLOOD PRESSURE: 66 MMHG

## 2019-04-07 VITALS — DIASTOLIC BLOOD PRESSURE: 87 MMHG | SYSTOLIC BLOOD PRESSURE: 150 MMHG

## 2019-04-07 VITALS — DIASTOLIC BLOOD PRESSURE: 85 MMHG | SYSTOLIC BLOOD PRESSURE: 156 MMHG

## 2019-04-07 LAB
ALBUMIN SERPL-MCNC: 2.6 G/DL (ref 3.4–5)
ALP SERPL-CCNC: 209 U/L (ref 46–116)
ALT SERPL-CCNC: 81 U/L (ref 10–68)
ANION GAP SERPL CALC-SCNC: 16.4 MMOL/L (ref 8–16)
APPEARANCE UR: CLEAR
BASOPHILS NFR BLD AUTO: 0.2 % (ref 0–2)
BILIRUB SERPL-MCNC: 1.48 MG/DL (ref 0.2–1.3)
BILIRUB SERPL-MCNC: NEGATIVE MG/DL
BUN SERPL-MCNC: 20 MG/DL (ref 7–18)
CALCIUM SERPL-MCNC: 4.2 MG/DL (ref 8.5–10.1)
CHLORIDE SERPL-SCNC: 103 MMOL/L (ref 98–107)
CK MB SERPL-MCNC: 1.5 U/L (ref 0–3.6)
CK MB SERPL-MCNC: 2.3 U/L (ref 0–3.6)
CK SERPL-CCNC: 336 UL (ref 21–232)
CK SERPL-CCNC: 406 UL (ref 21–232)
CO2 SERPL-SCNC: 26.1 MMOL/L (ref 21–32)
COLOR UR: YELLOW
CREAT SERPL-MCNC: 1.3 MG/DL (ref 0.6–1.3)
EOSINOPHIL NFR BLD: 0.6 % (ref 0–7)
ERYTHROCYTE [DISTWIDTH] IN BLOOD BY AUTOMATED COUNT: 19.2 % (ref 11.5–14.5)
GLOBULIN SER-MCNC: 4.4 G/L
GLUCOSE SERPL-MCNC: 141 MG/DL (ref 74–106)
GLUCOSE SERPL-MCNC: NEGATIVE MG/DL
HCT VFR BLD CALC: 38.4 % (ref 42–54)
HGB BLD-MCNC: 12.5 G/DL (ref 13.5–17.5)
IMM GRANULOCYTES NFR BLD: 0.5 % (ref 0–5)
INR PPP: 1.43 (ref 0.85–1.17)
KETONES UR STRIP-MCNC: NEGATIVE MG/DL
LYMPHOCYTES NFR BLD AUTO: 6.7 % (ref 15–50)
MCH RBC QN AUTO: 31.2 PG (ref 26–34)
MCHC RBC AUTO-ENTMCNC: 32.6 G/DL (ref 31–37)
MCV RBC: 95.8 FL (ref 80–100)
MONOCYTES NFR BLD: 5.6 % (ref 2–11)
NEUTROPHILS NFR BLD AUTO: 86.4 % (ref 40–80)
NITRITE UR-MCNC: NEGATIVE MG/ML
OSMOLALITY SERPL CALC.SUM OF ELEC: 287 MOSM/KG (ref 275–300)
PH UR STRIP: 5 [PH] (ref 5–6)
PLATELET # BLD: 208 10X3/UL (ref 130–400)
PMV BLD AUTO: 10.5 FL (ref 7.4–10.4)
POTASSIUM SERPL-SCNC: 3.5 MMOL/L (ref 3.5–5.1)
PROT SERPL-MCNC: 7 G/DL (ref 6.4–8.2)
PROT UR-MCNC: NEGATIVE MG/DL
PROTHROMBIN TIME: 16.9 SECONDS (ref 11.6–15)
RBC # BLD AUTO: 4.01 10X6/UL (ref 4.2–6.1)
SODIUM SERPL-SCNC: 142 MMOL/L (ref 136–145)
SP GR UR STRIP: 1 (ref 1–1.02)
TROPONIN I SERPL-MCNC: 0.15 NG/ML (ref 0–0.06)
TROPONIN I SERPL-MCNC: 0.16 NG/ML (ref 0–0.06)
UROBILINOGEN UR-MCNC: NORMAL MG/DL
WBC # BLD AUTO: 13 10X3/UL (ref 4.8–10.8)

## 2019-04-07 NOTE — NUR
RECEIVED REPORT, WILL ASSUME CARE OF PT, SLEEPING, NO DISTRESS NOTICED AT THIS
TIME, BED IS LOW, SRX2, CALL LIGHT IN REACH, WILL CONTINUE PLAN OF CARE

## 2019-04-08 VITALS — DIASTOLIC BLOOD PRESSURE: 75 MMHG | SYSTOLIC BLOOD PRESSURE: 123 MMHG

## 2019-04-08 VITALS — DIASTOLIC BLOOD PRESSURE: 74 MMHG | SYSTOLIC BLOOD PRESSURE: 122 MMHG

## 2019-04-08 VITALS — SYSTOLIC BLOOD PRESSURE: 120 MMHG | DIASTOLIC BLOOD PRESSURE: 71 MMHG

## 2019-04-08 VITALS — SYSTOLIC BLOOD PRESSURE: 116 MMHG | DIASTOLIC BLOOD PRESSURE: 69 MMHG

## 2019-04-08 VITALS — SYSTOLIC BLOOD PRESSURE: 124 MMHG | DIASTOLIC BLOOD PRESSURE: 70 MMHG

## 2019-04-08 LAB
ALBUMIN SERPL-MCNC: 2.2 G/DL (ref 3.4–5)
ALP SERPL-CCNC: 193 U/L (ref 46–116)
ALT SERPL-CCNC: 66 U/L (ref 10–68)
ANION GAP SERPL CALC-SCNC: 14 MMOL/L (ref 8–16)
BASOPHILS NFR BLD AUTO: 0.2 % (ref 0–2)
BILIRUB SERPL-MCNC: 1.18 MG/DL (ref 0.2–1.3)
BUN SERPL-MCNC: 21 MG/DL (ref 7–18)
CALCIUM SERPL-MCNC: < 5 MG/DL (ref 8.5–10.1)
CHLORIDE SERPL-SCNC: 104 MMOL/L (ref 98–107)
CK MB SERPL-MCNC: 2.1 U/L (ref 0–3.6)
CK SERPL-CCNC: 326 UL (ref 21–232)
CO2 SERPL-SCNC: 26.7 MMOL/L (ref 21–32)
CREAT SERPL-MCNC: 1.2 MG/DL (ref 0.6–1.3)
EOSINOPHIL NFR BLD: 0.8 % (ref 0–7)
ERYTHROCYTE [DISTWIDTH] IN BLOOD BY AUTOMATED COUNT: 19 % (ref 11.5–14.5)
GLOBULIN SER-MCNC: 4 G/L
GLUCOSE SERPL-MCNC: 127 MG/DL (ref 74–106)
HCT VFR BLD CALC: 34.1 % (ref 42–54)
HGB BLD-MCNC: 11 G/DL (ref 13.5–17.5)
IMM GRANULOCYTES NFR BLD: 0.3 % (ref 0–5)
LYMPHOCYTES NFR BLD AUTO: 7 % (ref 15–50)
MAGNESIUM SERPL-MCNC: 1.5 MG/DL (ref 1.8–2.4)
MCH RBC QN AUTO: 30.7 PG (ref 26–34)
MCHC RBC AUTO-ENTMCNC: 32.3 G/DL (ref 31–37)
MCV RBC: 95.3 FL (ref 80–100)
MONOCYTES NFR BLD: 5 % (ref 2–11)
NEUTROPHILS NFR BLD AUTO: 86.7 % (ref 40–80)
OSMOLALITY SERPL CALC.SUM OF ELEC: 285 MOSM/KG (ref 275–300)
PLATELET # BLD: 217 10X3/UL (ref 130–400)
PMV BLD AUTO: 11 FL (ref 7.4–10.4)
POTASSIUM SERPL-SCNC: 3.7 MMOL/L (ref 3.5–5.1)
PROT SERPL-MCNC: 6.2 G/DL (ref 6.4–8.2)
RBC # BLD AUTO: 3.58 10X6/UL (ref 4.2–6.1)
SODIUM SERPL-SCNC: 141 MMOL/L (ref 136–145)
TROPONIN I SERPL-MCNC: 0.14 NG/ML (ref 0–0.06)
WBC # BLD AUTO: 11.1 10X3/UL (ref 4.8–10.8)

## 2019-04-08 NOTE — NUR
OT NOTE: PT COMPLETED SIMPLE GROOMING TASKS UPRIGHT IN BED WITH MIN A. PT
COMPLETED BED MOB WITH MIN A.
THANK YOU, BRODY ANGUIANO

## 2019-04-08 NOTE — NUR
RECEIVED REPORT, WILL ASSUME CARE OF PT, PT IS SLEEEPING, BED IS LOW, SRX2,
CALL LIGHT IN REACH, WILL CONTINUE PLAN OF CARE

## 2019-04-09 VITALS — DIASTOLIC BLOOD PRESSURE: 75 MMHG | SYSTOLIC BLOOD PRESSURE: 135 MMHG

## 2019-04-09 VITALS — SYSTOLIC BLOOD PRESSURE: 125 MMHG | DIASTOLIC BLOOD PRESSURE: 76 MMHG

## 2019-04-09 VITALS — DIASTOLIC BLOOD PRESSURE: 76 MMHG | SYSTOLIC BLOOD PRESSURE: 122 MMHG

## 2019-04-09 VITALS — DIASTOLIC BLOOD PRESSURE: 96 MMHG | SYSTOLIC BLOOD PRESSURE: 143 MMHG

## 2019-04-09 VITALS — SYSTOLIC BLOOD PRESSURE: 120 MMHG | DIASTOLIC BLOOD PRESSURE: 74 MMHG

## 2019-04-09 VITALS — SYSTOLIC BLOOD PRESSURE: 131 MMHG | DIASTOLIC BLOOD PRESSURE: 80 MMHG

## 2019-04-09 LAB
ALBUMIN SERPL-MCNC: 2.2 G/DL (ref 3.4–5)
ALP SERPL-CCNC: 169 U/L (ref 46–116)
ALT SERPL-CCNC: 57 U/L (ref 10–68)
ANION GAP SERPL CALC-SCNC: 14.8 MMOL/L (ref 8–16)
BASOPHILS NFR BLD AUTO: 0.2 % (ref 0–2)
BILIRUB SERPL-MCNC: 0.93 MG/DL (ref 0.2–1.3)
BUN SERPL-MCNC: 17 MG/DL (ref 7–18)
CALCIUM SERPL-MCNC: < 5 MG/DL (ref 8.5–10.1)
CHLORIDE SERPL-SCNC: 103 MMOL/L (ref 98–107)
CO2 SERPL-SCNC: 24 MMOL/L (ref 21–32)
CREAT SERPL-MCNC: 1 MG/DL (ref 0.6–1.3)
EOSINOPHIL NFR BLD: 1.2 % (ref 0–7)
ERYTHROCYTE [DISTWIDTH] IN BLOOD BY AUTOMATED COUNT: 18.8 % (ref 11.5–14.5)
GLOBULIN SER-MCNC: 3.8 G/L
GLUCOSE SERPL-MCNC: 108 MG/DL (ref 74–106)
HCT VFR BLD CALC: 31.9 % (ref 42–54)
HGB BLD-MCNC: 10.3 G/DL (ref 13.5–17.5)
IMM GRANULOCYTES NFR BLD: 0.2 % (ref 0–5)
LYMPHOCYTES NFR BLD AUTO: 6.7 % (ref 15–50)
MAGNESIUM SERPL-MCNC: 1.8 MG/DL (ref 1.8–2.4)
MCH RBC QN AUTO: 31 PG (ref 26–34)
MCHC RBC AUTO-ENTMCNC: 32.3 G/DL (ref 31–37)
MCV RBC: 96.1 FL (ref 80–100)
MONOCYTES NFR BLD: 5.6 % (ref 2–11)
NEUTROPHILS NFR BLD AUTO: 86.1 % (ref 40–80)
OSMOLALITY SERPL CALC.SUM OF ELEC: 278 MOSM/KG (ref 275–300)
PLATELET # BLD: 198 10X3/UL (ref 130–400)
PMV BLD AUTO: 11 FL (ref 7.4–10.4)
POTASSIUM SERPL-SCNC: 3.8 MMOL/L (ref 3.5–5.1)
PROT SERPL-MCNC: 6 G/DL (ref 6.4–8.2)
RBC # BLD AUTO: 3.32 10X6/UL (ref 4.2–6.1)
SODIUM SERPL-SCNC: 138 MMOL/L (ref 136–145)
WBC # BLD AUTO: 10.1 10X3/UL (ref 4.8–10.8)

## 2019-04-09 NOTE — NUR
RESUMING PT CARE, PT IS LAYING IN BED ALERT AND ORIENTED, RESPIRATIONS EVEN
AND UNLABORED. BED IS IN LOWEST POSITION WITH RAILS UP X2. CALL LIGHT IN
REACH, WILL CONTINUE TO MONITOR AND FOLLOW PLAN OF CARE.

## 2019-04-09 NOTE — MORECARE
CASE MANAGEMENT DISCHARGE SUMMARY
 
 
PATIENT: SHERI BLAKE                     UNIT: [id number]
ACCOUNT#: [id number]                       ADM DATE: 19
AGE: 64     : 53  SEX: M            ROOM/BED: D.5286    
AUTHOR: ELKIN,DOC                             PHYSICIAN:                               
 
REFERRING PHYSICIAN: REJI CAZARES MD          
DATE OF SERVICE: 19
Discharge Plan
 
 
Patient Name: SHERI BLAKE
Facility: [facility]:[place]
Encounter #: [id number]
Medical Record #: [id number]
: 1955
Planned Disposition: Skilled Nursing Facility
Anticipated Discharge Date: 19
 
Discharge Date: 
Expected LOS: 3
Initial Reviewer: [email or web address]
Initial Review Date: 2019
Generated: 19   2:07 pm 
Comments
 
DCP- Discharge Planning
 
Updated by [id number]: Vasiliy Saucedo on 19  12:00 pm CT
Patient Name: SHERI BLAKE                                     
Admission Status: ER   
Accout number: [id number]                              
Admission Date: 2019   
: 1955                                                        
Admission Diagnosis:DYSPNEA, UNSPECIFIED   
Attending: DEBORA,                                                
Current LOS:  3   
  
Anticipated DC Date: 2019   
Planned Disposition: Skilled Nursing Facility   
Primary Insurance: MEDICARE A & B   
PLANNED EXTERNAL PROVIDER:  FIRST ACCEPTING SKILLED NURSING FACILITY  
  
Discharge Planning Comments:   
CM MET WITH PT IN ROOM TO DISCUSS DISCHARGE PLANNING AND NEEDS. PT REPORTS 
LIVING AT HOME INDEPENDENTLY AND ALONE.   PT HAS  HOME OXYGEN FROM UNKNOWN 
PROVIDER.  PT USES A CANE TO WALK.  PT HAS HOUSEKEEPING PROVIDED BY HIS 
DISABILITY ACCESSIBLE APARTMENT BUT HAS NO OUTSIDE SERVICES ASSISTING IN THE 
 
HOME. CM DISCUSSED AVAILABILITY OF HOME HEALTH, REHAB SERVICES AND MEDICAL 
EQUIPMENT. PT REPORTS HE IS NOT ABLE TO TAKE CARE OF HIMSELF AND NEEDS TO GO 
TO A NURSING HOME FOR LONG TERM CARE.  CM DISCUSSED PT'S ALCOHOLISM.  PT 
DENIES NEED FOR TREATMENT AND REPORTS HE ONLY WAS DRINKING THREE SHOTS OF 
VODKA PER DAY, LESS THAN IS IN A MARTINI.  PT STATES HE NEEDS LONG TERM CARE 
AND KNOWS HE CANNOT RETURN HOME AGAIN AND ASKED CM TO GET HIM INTO A [place] NURSING HOME.  LISTING PROVIDED, PT SIGNED CONSENT FOR ANY NURSING 
HOME, ONE IN [place] IF POSSIBLE.  PT REPORTS IT TO BE OK TO COMMUNICATE 
WITH HIS SISTER, ISAIAS WATTERS, IF NEEDED.  PT STATES HE DOES NOT WANT TO 
GO TO [place] TO BE CLOSE TO HER.  IMPORTANT MESSAGE FROM MEDICARE PROVIDED 
AND EXPLAINED.  
  
CM FAXED REFERRALS FOR SKILLED REHAB AND LONG TERM CARE TO [facility], [facility], [facility], [facility] AND [facility].  
  
CM WAITING ADMISSION DETERMINATIONS FROM EVERY NURSING FACILITY IN [place].  
  
  
: Vaisliy Saucedo
 DCPIA - Discharge Planning Initial Assessment
 
Updated by [id number]: Vasiliy Saucedo on 19  12:18 pm
*  Is the patient Alert and Oriented?
Yes
*  How many steps to enter\exit or inside your home? ELEVATOR *  PCP NONE *  Pharmacy
[facility] [address]
*  Preadmission Environment
Home Alone
*  ADLs
Independent
*  Equipment
Oxygen
*  Other Equipment
HOME OXYGEN ONLY- UNKNOWN PROVIDER
*  List name and contact numbers for known caregivers / representatives who 
currently or will assist patient after discharge:
ISAIAS WATTERS, SISTER, [phone]
*  Verbal permission to speak to the caregivers and representatives has been 
obtained from the patient.
Yes
*  Community resources currently utilized
Other
*  Please name any agencies selected above.
HOUSEKEEPING AT APARTMENT AS NEEDED.
*  Additional services required to return to the preadmission environment?
Yes
*  Can the patient safely return to the preadmission environment?
Yes
*  Has this patient been hospitalized within the prior 30 days at any 
hospital?
 
Yes
 
 
 
 
 
Coverage Notice
 
Reviewer: [email or web address] - Vasiliy Saucedo
 
Notice Issued Date-Time: 2019   9:10
Notice Type: IM Discharge Notice
 
Notice Delivered To: Patient
Relationship to Patient: 
Representative Name: 
 
Delivery Method: HAND - Hand Delivered
Rahel Days:
Prior Verbal Notification: 
 
Recipient Understood Notice: Yes
Recipient Signature: Yes
Med Rec Note Co-signed by Attending:
 
Coverage Notice Comment:  
 
Last DP export: 19  11:58 am
Patient Name: SHERI BLAKE
Encounter #: [id number]
Page [phone]
 
 
 
 
 
Electronically Signed by MERVAT GRANGER on 19 at 1307
 
 
 
 
 
 
**All edits/amendments must be made on the electronic document**
 
DICTATION DATE: 19 1306     : CHANDRAKANT  19 1306     
RPT#: [id number]                                DC DATE:        
                                               STATUS: ADM IN  
[facility]
[address] [place] [address]
***END OF REPORT***

## 2019-04-09 NOTE — MORECARE
CASE MANAGEMENT DISCHARGE SUMMARY
 
 
PATIENT: SHERI BLAKE                     UNIT: [id number]
ACCOUNT#: [id number]                       ADM DATE: 19
AGE: 64     : 53  SEX: M            ROOM/BED: D.2126    
AUTHOR: MERVAT GRANGER                             PHYSICIAN:                               
 
REFERRING PHYSICIAN: REJI CAZARES MD          
DATE OF SERVICE: 19
Discharge Plan
 
 
Patient Name: SHERI BLAKE
Facility: [facility]:[place]
Encounter #: [id number]
Medical Record #: [id number]
: 1955
Planned Disposition: Skilled Nursing Facility
Anticipated Discharge Date: 19
 
Discharge Date: 
Expected LOS: 3
Initial Reviewer: [id number]
Initial Review Date: 2019
Generated: 19   1:42 pm 
 DCPIA - Discharge Planning Initial Assessment
 
Updated by [id number]: Vasiliy Saucedo on 19  12:18 pm
*  Is the patient Alert and Oriented?
Yes
*  How many steps to enter\exit or inside your home? ELEVATOR *  PCP NONE *  Pharmacy
[facility]
*  Preadmission Environment
Home Alone
*  ADLs
Independent
*  Equipment
Oxygen
*  Other Equipment
HOME OXYGEN ONLY- UNKNOWN PROVIDER
*  List name and contact numbers for known caregivers / representatives who 
currently or will assist patient after discharge:
ISAIAS WATTERS, SISTER, [phone]
*  Verbal permission to speak to the caregivers and representatives has been 
obtained from the patient.
Yes
*  Community resources currently utilized
Other
*  Please name any agencies selected above.
 
HOUSEKEEPING AT APARTMENT AS NEEDED.
*  Additional services required to return to the preadmission environment?
Yes
*  Can the patient safely return to the preadmission environment?
Yes
*  Has this patient been hospitalized within the prior 30 days at any 
hospital?
Yes
 
External Providers
External Provider: [facility]
 
Next Contact Date: 04/10/2019
Service Request Date: 
Service Type: 
Resolution: 
 
Reviewer: 
Comments: 
External Provider: [facility]
 
Next Contact Date: 04/10/2019
Service Request Date: 
Service Type: 
Resolution: 
 
Reviewer: 
Comments: 
 
 
 
 
 
 
Last DP export: 19  11:27 am
Patient Name: SHERI BLAKE
 
Encounter #: [id number]
Page [id number]
 
 
 
 
 
Electronically Signed by MERVAT GRANGER on 19 at 1242
 
 
 
 
 
 
**All edits/amendments must be made on the electronic document**
 
DICTATION DATE: 19     : CHANDRAKANT  19 1242     
RPT#: [id number]                                DC DATE:        
                                               STATUS: ADM IN  
[facility]
 [place] [address]
***END OF REPORT***

## 2019-04-09 NOTE — NUR
OT NOTE: PT COMPLETED BED MOB AND SUPINE TO SIT WITH MIN/MOD A. PT COMPLETED
EOB SITTING WITH MIN A. PT COMPLETED GROOMING TASKS AT EOB WITH MIN A. PT
REQUIRED VERBAL CUES FOR TASK SEQUENCING.
THANK YOU, BRODY ANGUIANO

## 2019-04-09 NOTE — MORECARE
CASE MANAGEMENT DISCHARGE SUMMARY
 
 
PATIENT: SHERI BLAKE                     UNIT: [id number]
ACCOUNT#: [id number]                       ADM DATE: 19
AGE: 64     : 53  SEX: M            ROOM/BED: D.2126    
AUTHOR: MERVAT GRANGER                             PHYSICIAN:                               
 
REFERRING PHYSICIAN: REJI CAZARES MD          
DATE OF SERVICE: 19
Discharge Plan
 
 
Patient Name: SHREI BLAKE
Facility: [facility]:[place]
Encounter #: [id number]
Medical Record #: [id number]
: 1955
Planned Disposition: Skilled Nursing Facility
Anticipated Discharge Date: 19
 
Discharge Date: 
Expected LOS: 3
Initial Reviewer: [id number]
Initial Review Date: 2019
Generated: 19   1:26 pm 
 DCPIA - Discharge Planning Initial Assessment
 
Updated by [id number]: Vasiliy Saucedo on 19  12:18 pm
*  Is the patient Alert and Oriented?
Yes
*  How many steps to enter\exit or inside your home? ELEVATOR *  PCP NONE *  Pharmacy
[facility]
*  Preadmission Environment
Home Alone
*  ADLs
Independent
*  Equipment
Oxygen
*  Other Equipment
HOME OXYGEN ONLY- UNKNOWN PROVIDER
*  List name and contact numbers for known caregivers / representatives who 
currently or will assist patient after discharge:
ISAIAS WATTERS, SISTER, [phone]
*  Verbal permission to speak to the caregivers and representatives has been 
obtained from the patient.
Yes
*  Community resources currently utilized
Other
*  Please name any agencies selected above.
 
HOUSEKEEPING AT APARTMENT AS NEEDED.
*  Additional services required to return to the preadmission environment?
Yes
*  Can the patient safely return to the preadmission environment?
Yes
*  Has this patient been hospitalized within the prior 30 days at any 
hospital?
Yes
 
 
 
 
 
 
 
Last DP export: 19  11:18 am
Patient Name: SHERI BLAKE
Encounter #: [id number]
Page [id number]
 
 
 
 
 
Electronically Signed by MERVAT GRANGER on 19 at 1227
 
 
 
 
 
 
**All edits/amendments must be made on the electronic document**
 
DICTATION DATE: 19 1226     : CHANDRAKANT  19 1226     
RPT#: [id number]                                DC DATE:        
                                               STATUS: ADM IN  
[facility]
[address] [place] [address]
***END OF REPORT***

## 2019-04-09 NOTE — NUR
RESUMING PT CARE, PT SITTING UP IN BED EATING BREAKFAST, ALERT AND ORIENTED
X3, BED IS IN LOWEST POSITION WITH RAILS UP X2. CALL LIGHT IN REACH, WILL
CONTINUE TO MONITOR AND FOLLOW PLAN OF CARE.

## 2019-04-09 NOTE — MORECARE
CASE MANAGEMENT DISCHARGE SUMMARY
 
 
PATIENT: SHERI BLAKE                     UNIT: [id number]
ACCOUNT#: [id number]                       ADM DATE: 19
AGE: 64     : 53  SEX: M            ROOM/BED: D.2126    
AUTHOR: ELKINDOC                             PHYSICIAN:                               
 
REFERRING PHYSICIAN: REJI CAZARES MD          
DATE OF SERVICE: 19
Discharge Plan
 
 
Patient Name: SHERI BLAKE
Facility: [facility]:[place]
Encounter #: [id number]
Medical Record #: [id number]
: 1955
Planned Disposition: Skilled Nursing Facility
Anticipated Discharge Date: 19
 
Discharge Date: 
Expected LOS: 3
Initial Reviewer: [email or web address]
Initial Review Date: 2019
Generated: 19   1:50 pm 
 DCPIA - Discharge Planning Initial Assessment
 
Updated by [id number]: Vasiliy Saucedo on 19  12:18 pm
*  Is the patient Alert and Oriented?
Yes
*  How many steps to enter\exit or inside your home? ELEVATOR *  PCP NONE *  Pharmacy
[facility]
*  Preadmission Environment
Home Alone
*  ADLs
Independent
*  Equipment
Oxygen
*  Other Equipment
HOME OXYGEN ONLY- UNKNOWN PROVIDER
*  List name and contact numbers for known caregivers / representatives who 
currently or will assist patient after discharge:
ISAIAS WATTERS, SISTER, [phone]
*  Verbal permission to speak to the caregivers and representatives has been 
obtained from the patient.
Yes
*  Community resources currently utilized
Other
*  Please name any agencies selected above.
 
HOUSEKEEPING AT APARTMENT AS NEEDED.
*  Additional services required to return to the preadmission environment?
Yes
*  Can the patient safely return to the preadmission environment?
Yes
*  Has this patient been hospitalized within the prior 30 days at any 
hospital?
Yes
 
External Providers
[facility]
 
Next Contact Date: 04/10/2019
Service Request Date: 
Service Type: 
Resolution: 
 
Reviewer: 
Comments: 
[facility]
 
Next Contact Date: 04/10/2019
Service Request Date: 
Service Type: 
Resolution: 
 
Reviewer: 
Comments: 
External Provider: [facility]
 
Next Contact Date: 04/10/2019
Service Request Date: 
Service Type: 
Resolution: 
 
Reviewer: 
Comments: 
External Provider: NEELAM[facility]
 
Next Contact Date: 04/10/2019
Service Request Date: 
Service Type: 
Resolution: 
 
Reviewer: 
Comments: 
 
 
 
 
 
 
 
Last DP export: 19  11:42 am
Patient Name: SHERI BLAKE
Encounter #: [id number]
Page [id number]
 
 
 
 
 
Electronically Signed by MERVAT GRANGER on 19 at 1250
 
 
 
 
 
 
**All edits/amendments must be made on the electronic document**
 
DICTATION DATE: 19 1250     : CHANDRAKANT  19 1250     
RPT#: [id number]                                DC DATE:        
                                               STATUS: ADM IN  
[facility]
[address] [place] [address]
***END OF REPORT***

## 2019-04-09 NOTE — MORECARE
CASE MANAGEMENT DISCHARGE SUMMARY
 
 
PATIENT: SHERI BLAEK                     UNIT: [id number]
ACCOUNT#: [id number]                       ADM DATE: 19
AGE: 64     : 53  SEX: M            ROOM/BED: D.2126    
AUTHOR: MERVAT GRANGER                             PHYSICIAN:                               
 
REFERRING PHYSICIAN: REJI CAZARES MD          
DATE OF SERVICE: 19
Discharge Plan
 
 
Patient Name: SHERI BLAKE
[facility]
Encounter #: [id number]
Medical Record #: [id number]
: 1955
Planned Disposition: Skilled Nursing Facility
Anticipated Discharge Date: 19
 
Discharge Date: 
Expected LOS: 3
Initial Reviewer: [id number]
Initial Review Date: 2019
Generated: 19   1:18 pm 
  
 
 
 
 
 
 
 
Patient Name: SHERI BLAKE
 
Encounter #: [id number]
Page [id number]
 
 
 
 
 
Electronically Signed by MERVAT GRANGER on 19 at 1218
 
 
 
 
 
 
**All edits/amendments must be made on the electronic document**
 
DICTATION DATE: 19 1217     : CHANDRAKANT  19 1217     
RPT#: [id number]                                DC DATE:        
                                               STATUS: ADM IN  
[facility]
[address] [place] [address]
***END OF REPORT***

## 2019-04-09 NOTE — MORECARE
CASE MANAGEMENT DISCHARGE SUMMARY
 
 
PATIENT: SHERI BLAKE                     UNIT: [id number]
ACCOUNT#: [id number]                       ADM DATE: 19
AGE: 64     : 53  SEX: M            ROOM/BED: D.2126    
AUTHOR: MERVAT GRANGER                             PHYSICIAN:                               
 
REFERRING PHYSICIAN: REJI CAZARES MD          
DATE OF SERVICE: 19
Discharge Plan
 
 
Patient Name: SHERI BLAKE
Facility: [facility]:[place]
Encounter #: [id number]
Medical Record #: [id number]
: 1955
Planned Disposition: Skilled Nursing Facility
Anticipated Discharge Date: 19
 
Discharge Date: 
Expected LOS: 3
Initial Reviewer: [id number]
Initial Review Date: 2019
Generated: 19   1:58 pm 
 DCPIA - Discharge Planning Initial Assessment
 
Updated by [id number]: Vasiliy Saucedo on 19  12:18 pm
*  Is the patient Alert and Oriented?
Yes
*  How many steps to enter\exit or inside your home? ELEVATOR *  PCP NONE *  Pharmacy
[facility]
*  Preadmission Environment
Home Alone
*  ADLs
Independent
*  Equipment
Oxygen
*  Other Equipment
HOME OXYGEN ONLY- UNKNOWN PROVIDER
*  List name and contact numbers for known caregivers / representatives who 
currently or will assist patient after discharge:
ISAIAS WATTERS, SISTER, [phone]
*  Verbal permission to speak to the caregivers and representatives has been 
obtained from the patient.
Yes
*  Community resources currently utilized
Other
*  Please name any agencies selected above.
 
HOUSEKEEPING AT APARTMENT AS NEEDED.
*  Additional services required to return to the preadmission environment?
Yes
*  Can the patient safely return to the preadmission environment?
Yes
*  Has this patient been hospitalized within the prior 30 days at any 
hospital?
Yes
 
External Providers
External Provider: [facility]
 
Next Contact Date: 04/10/2019
Service Request Date: 
Service Type: 
Resolution: 
 
Reviewer: 
Comments: 
 
 
 
 
 
 
Last DP export: 19  11:50 am
Patient Name: SHERI BLAKE
Encounter #: [id number]
Page [id number]
 
 
 
 
 
Electronically Signed by MERVAT GRANGER on 19 at 1258
 
 
 
 
 
 
**All edits/amendments must be made on the electronic document**
 
DICTATION DATE: 19 1258     : CHANDRAKANT  19 1258     
RPT#: [id number]                                DC DATE:        
                                               STATUS: ADM IN  
[facility]
 [place] [address]
***END OF REPORT***

## 2019-04-09 NOTE — NUR
PT C/O INCREASED WEAKNESS AND REQUEST CASE MANAGEMENT TO FIND PLACEMENT FOR
REHAB. PT STATES "I CANT EVEN STAND UP ANYMORE". CASE MANAGEMENT NOTIFIED.

## 2019-04-09 NOTE — MORECARE
CASE MANAGEMENT DISCHARGE SUMMARY
 
 
PATIENT: SHERI BLAKE                     UNIT: [id number]
ACCOUNT#: [id number]                       ADM DATE: 19
AGE: 64     : 53  SEX: M            ROOM/BED: D.7486    
AUTHOR: ELKIN,DOC                             PHYSICIAN:                               
 
REFERRING PHYSICIAN: REJI CAZARES MD          
DATE OF SERVICE: 19
Discharge Plan
 
 
Patient Name: SHERI BLAKE
Facility: [facility]:[place]
Encounter #: [id number]
Medical Record #: [id number]
: 1955
Planned Disposition: Skilled Nursing Facility
Anticipated Discharge Date: 4/10/19
 
Discharge Date: 
Expected LOS: 4
Initial Reviewer: [email or web address]
Initial Review Date: 2019
Generated: 19   4:08 pm 
Comments
 
DCP- Discharge Planning
 
Updated by [id number]: Vasiliy Harris on 19   2:08 pm CT
Patient Name: SHERI BLAKE                                     
Admission Status: ER   
Accout number: [id number]                              
Admission Date: 2019   
: 1955                                                        
Admission Diagnosis:DYSPNEA, UNSPECIFIED   
Attending: DEBORA,                                                
Current LOS:  3   
  
Anticipated DC Date: 2019   
Planned Disposition: Skilled Nursing Facility   
Primary Insurance: MEDICARE A & B   
PLANNED EXTERNAL PROVIDER:  [facility]  
  
Discharge Planning Comments:   
CM MET WITH PT IN ROOM TO DISCUSS DISCHARGE PLANNING AND NEEDS. PT REPORTS 
LIVING AT HOME INDEPENDENTLY AND ALONE.   PT HAS  HOME OXYGEN FROM UNKNOWN 
PROVIDER.  PT USES A CANE TO WALK.  PT HAS HOUSEKEEPING PROVIDED BY HIS 
DISABILITY ACCESSIBLE APARTMENT BUT HAS NO OUTSIDE SERVICES ASSISTING IN THE 
 
HOME. CM DISCUSSED AVAILABILITY OF HOME HEALTH, REHAB SERVICES AND MEDICAL 
EQUIPMENT. PT REPORTS HE IS NOT ABLE TO TAKE CARE OF HIMSELF AND NEEDS TO GO 
TO A NURSING HOME FOR LONG TERM CARE.  CM DISCUSSED PT'S ALCOHOLISM.  PT 
DENIES NEED FOR TREATMENT AND REPORTS HE ONLY WAS DRINKING THREE SHOTS OF 
VODKA PER DAY, LESS THAN IS IN A [place]INI.  PT STATES HE NEEDS LONG TERM CARE 
AND KNOWS HE CANNOT RETURN HOME AGAIN AND ASKED CM TO GET HIM INTO A [place] NURSING HOME.  LISTING PROVIDED, PT SIGNED CONSENT FOR ANY NURSING 
HOME, ONE IN [place] IF POSSIBLE.  PT REPORTS IT TO BE OK TO COMMUNICATE 
WITH HIS SISTER, ISAIAS WATTERS, IF NEEDED.  PT STATES HE DOES NOT WANT TO 
GO TO [place] TO BE CLOSE TO HER.  IMPORTANT MESSAGE FROM MEDICARE PROVIDED 
AND EXPLAINED.  
  
CM FAXED REFERRALS FOR SKILLED REHAB AND LONG TERM CARE TO [facility], [facility], [facility], [facility], [facility], [facility] AND [facility].  
  
CM WAITING ADMISSION DETERMINATIONS FROM EVERY NURSING FACILITY IN [place].  
  
  
: Vasiliy Harris  
  
Appended by Vasiliy Harris on 2019 15:08 CDT:  
PLANNED EXTERNAL PROVIDER:  [facility], MEDICARE REHAB BED  
  
CM RECEIVED CALL FROM [facility], SPOKE TO SUMAYA WHO INFORMED 
CM THAT THEY WILL ACCEPT PT IF HE WILL AGREE TO NO SMOKING OF CIGARETTES AT 
FACILITY.  CM MET WITH PT AND DISCUSSED PLACEMENT FOR REHAB AND LONG TERM 
CARE AT NON SMOKING FACILITY.  PT AGREES.  CM NOTIFIED SUMAYA AT [facility], 
THEY WILL ACCEPT PT AT DISCHARGE FOR SKILLED REHAB.  
  
FOR DISCHARGE, FAX DISCHARGE INFORMATION TO [facility] [phone].  CALL 
NURSE REPORT TO [facility] [phone].  [facility] TO PROVIDE VAN 
TRANSPORT.  
  
VASILIY HARRIS, CASE MANAGEMENT
 DCPIA - Discharge Planning Initial Assessment
 
Updated by [id number]: Vasiliy Harris on 19  12:18 pm
*  Is the patient Alert and Oriented?
Yes
*  How many steps to enter\exit or inside your home? ELEVATOR *  PCP NONE *  Pharmacy
[facility] BY [facility]
*  Preadmission Environment
Home Alone
*  ADLs
Independent
*  Equipment
Oxygen
*  Other Equipment
HOME OXYGEN ONLY- UNKNOWN PROVIDER
*  List name and contact numbers for known caregivers / representatives who 
 
currently or will assist patient after discharge:
ISAIAS WATTERS, SISTER, [phone]
*  Verbal permission to speak to the caregivers and representatives has been 
obtained from the patient.
Yes
*  Community resources currently utilized
Other
*  Please name any agencies selected above.
HOUSEKEEPING AT APARTMENT AS NEEDED.
*  Additional services required to return to the preadmission environment?
Yes
*  Can the patient safely return to the preadmission environment?
Yes
*  Has this patient been hospitalized within the prior 30 days at any 
hospital?
Yes
 
 
 
 
 
Coverage Notice
 
Reviewer: [id number] - Vasiliy Harris
 
Notice Issued Date-Time: 2019   9:10
Notice Type: IM Discharge Notice
 
Notice Delivered To: Patient
Relationship to Patient: 
Representative Name: 
 
Delivery Method: HAND - Hand Delivered
Rahel Days:
Prior Verbal Notification: 
 
Recipient Understood Notice: Yes
Recipient Signature: Yes
Med Rec Note Co-signed by Attending:
 
Coverage Notice Comment:  
Reviewer: [id number] - Vasiliy Harris
 
Notice Issued Date-Time: 2019   9:10
Notice Type: Patient Choice Letter
 
Notice Delivered To: Patient
Relationship to Patient: 
Representative Name: 
 
Delivery Method: HAND - Hand Delivered
Rahel Days:
 
Prior Verbal Notification: 
 
Recipient Understood Notice: Yes
Recipient Signature: Yes
Med Rec Note Co-signed by Attending:
 
Coverage Notice Comment:  ANY NURSING HOME, IN [place] IF POSSIBLE
 
Last DP export: 19  12:07 pm
Patient Name: SHERI BLAKE
Encounter #: [id number]
Page [id number]
 
 
 
 
 
Electronically Signed by MERVAT GRANGER on 19 at 1509
 
 
 
 
 
 
**All edits/amendments must be made on the electronic document**
 
DICTATION DATE: 19 1508     : CHANDRAKANT  19 1508     
RPT#: [id number]                                MI DATE:        
                                               STATUS: ADM IN  
[facility]
[address] [place] [address]
***END OF REPORT***

## 2019-04-09 NOTE — NUR
OT NOTE: BED MOB WITH MIN ASSIST; TRANSFERS WITH MIN ASSIST; ABLE TO FEED SELF
WITH SET UP HOWEVER, REMAINS VERY LETHARGIC AGAIN TODAY.
MOODY FERGUSON, OTR/L

## 2019-04-10 VITALS — DIASTOLIC BLOOD PRESSURE: 72 MMHG | SYSTOLIC BLOOD PRESSURE: 143 MMHG

## 2019-04-10 VITALS — DIASTOLIC BLOOD PRESSURE: 86 MMHG | SYSTOLIC BLOOD PRESSURE: 114 MMHG

## 2019-04-10 LAB
ALBUMIN SERPL-MCNC: 2.2 G/DL (ref 3.4–5)
ALP SERPL-CCNC: 162 U/L (ref 46–116)
ALT SERPL-CCNC: 47 U/L (ref 10–68)
ANION GAP SERPL CALC-SCNC: 15.2 MMOL/L (ref 8–16)
BASOPHILS NFR BLD AUTO: 0.2 % (ref 0–2)
BILIRUB SERPL-MCNC: 1.06 MG/DL (ref 0.2–1.3)
BUN SERPL-MCNC: 17 MG/DL (ref 7–18)
CALCIUM SERPL-MCNC: < 5 MG/DL (ref 8.5–10.1)
CHLORIDE SERPL-SCNC: 102 MMOL/L (ref 98–107)
CO2 SERPL-SCNC: 22.3 MMOL/L (ref 21–32)
CREAT SERPL-MCNC: 0.8 MG/DL (ref 0.6–1.3)
EOSINOPHIL NFR BLD: 0.7 % (ref 0–7)
ERYTHROCYTE [DISTWIDTH] IN BLOOD BY AUTOMATED COUNT: 18.8 % (ref 11.5–14.5)
GLOBULIN SER-MCNC: 3.9 G/L
GLUCOSE SERPL-MCNC: 104 MG/DL (ref 74–106)
HCT VFR BLD CALC: 31.5 % (ref 42–54)
HGB BLD-MCNC: 10.4 G/DL (ref 13.5–17.5)
IMM GRANULOCYTES NFR BLD: 0.3 % (ref 0–5)
LYMPHOCYTES NFR BLD AUTO: 6.3 % (ref 15–50)
MAGNESIUM SERPL-MCNC: 1.8 MG/DL (ref 1.8–2.4)
MCH RBC QN AUTO: 31.6 PG (ref 26–34)
MCHC RBC AUTO-ENTMCNC: 33 G/DL (ref 31–37)
MCV RBC: 95.7 FL (ref 80–100)
MONOCYTES NFR BLD: 6.1 % (ref 2–11)
NEUTROPHILS NFR BLD AUTO: 86.4 % (ref 40–80)
OSMOLALITY SERPL CALC.SUM OF ELEC: 273 MOSM/KG (ref 275–300)
PLATELET # BLD: 189 10X3/UL (ref 130–400)
PMV BLD AUTO: 10.6 FL (ref 7.4–10.4)
POTASSIUM SERPL-SCNC: 3.5 MMOL/L (ref 3.5–5.1)
PROT SERPL-MCNC: 6.1 G/DL (ref 6.4–8.2)
RBC # BLD AUTO: 3.29 10X6/UL (ref 4.2–6.1)
SODIUM SERPL-SCNC: 136 MMOL/L (ref 136–145)
WBC # BLD AUTO: 9.8 10X3/UL (ref 4.8–10.8)

## 2019-04-10 NOTE — NUR
PT DISCHARGED TO [facility]. PIV
REMOVED WITH CATHETER TIP FULLY INTACT. DISCHARGE PAPERWORK SIGNED BY NURSE
AND WITNESSED BY NURSE R/T PT NOT BEING ABLE TO SIGN. ALL VALUABLES REMOVED
FROM ROOM.

## 2019-04-10 NOTE — NUR
OT NOTE: PT VERY LETHARGIC IN AM.  SLIGHTLY RESISTANT TO SITTING UP ON EOB,
BUT FINALLY AGREED. ABLE TO PERFORM WITHOUT ASSIST. STATIC SITTING BALANCE IS
GOOD. GROOMING TASKS WITH SET UP. PT REFUSED TO AMB IN ROOM, HOWEVER, WAS
OBSERVED IN HALLWAY A SHORT TIME LATER. UNSTEADY GAIT.
ALEX AYON TR/L

## 2019-04-10 NOTE — NUR
PT STILL YELLING OUT. SAYING PLACEBO. SHE GAVE ME A PLACEBO. CUSSING AND
ANGERY ABOUT NOT GETTING A CIGG. PT HAS AN ALARM ON AND ACTIVE. PT CONFUSED.
IS NOT COMPLIANT. WILL CPOC

## 2019-04-10 NOTE — NUR
PT YELLING OUT FOR A CIGG. CONTINUED TO TELL PT NO. MORPHINE GIVEN FOR
RESTLESSNESS AND BACK PAIN. PT UPSET AND CONTINUES TO GET UP AND DOWN,. ALARM
ON AND ACTIVE. WILL CPOC

## 2019-04-10 NOTE — NUR
REPORT RECEIVED. WILL CONTINUE WITH POC. PT CURRENTLY LYING SEMI FOWLERS. CALL
LIGHT W/I REACH. PT IS RESTING AT THE MOMENT. RR EVEN AND UNLABORED ON RA. NS
INFUSING @KVO VIA L.FOR PIV. NO S/S OF DISTRESS NOTED. PT DENIES ANY NEEDS AT
THIS TIME. WILL CTM.

## 2019-04-10 NOTE — MORECARE
CASE MANAGEMENT DISCHARGE SUMMARY
 
 
PATIENT: SHERI BLAKE                     UNIT: [id number]
ACCOUNT#: [id number]                       ADM DATE: 19
AGE: 64     : 53  SEX: M            ROOM/BED: D.2126    
AUTHOR: MERVAT GRANGER                             PHYSICIAN:                               
 
REFERRING PHYSICIAN: REJI CAZARES MD          
DATE OF SERVICE: 04/10/19
Discharge Plan
 
 
Patient Name: SHERI BLAKE
Facility: [facility]:[place]
Encounter #: [id number]
Medical Record #: [id number]
: 1955
Planned Disposition: Skilled Nursing Facility
Anticipated Discharge Date: 4/10/19
 
Discharge Date: 04/10/2019
Expected LOS: 4
Initial Reviewer: [id number]
Initial Review Date: 2019
Generated: 4/10/19   1:42 pm 
Comments
 
DCP- Discharge Planning
 
Updated by [id number]: Vasiliy Harris on 4/10/19  11:39 am CT
Patient Name:  SHERI BLAKE   
Encounter No:  [id number]   
:  1955   
Primary Insurance:  MEDICARE A & B  
Anticipated DC Date: 04-   
Planned Disposition:  Skilled Nursing Facility  
External Planned Provider: [facility], MEDICARE REHAB BED  
  
  
DCP follow-up note: CM RECEIVED DISCHARGE ORDER, MET WITH PT IN ROOM WHO IS 
IN AGREEMENT WITH DISCHARGE TO [facility] TODAY.  CM FAXED DISCHARGE 
INFORMATION TO [facility] -[phone]. CM SPOKE TO KERI AT [facility] WHO 
WILL ARRANGE TRANSPORT  AT NOON TODAY.   NURSE NOTFIED.
  
  
 CALL NURSE REPORT TO [facility] [phone].  [facility] TO PROVIDE VAN 
TRANSPORT.  
  
TANYA FERRER
 
 
DCP- Discharge Planning
 
Updated by [id number]: Vasiliy Harris on 19   2:08 pm CT
Patient Name: SHERI BLAKE                                     
Admission Status: ER   
Accout number: [id number]                              
Admission Date: 2019   
: 1955                                                        
Admission Diagnosis:DYSPNEA, UNSPECIFIED   
Attending: DEBORA,                                                
Current LOS:  3   
  
Anticipated DC Date: 2019   
Planned Disposition: Skilled Nursing Facility   
Primary Insurance: MEDICARE A & B   
PLANNED EXTERNAL PROVIDER:  FIRST ACCEPTING SKILLED NURSING FACILITY  
  
Discharge Planning Comments:   
CM MET WITH PT IN ROOM TO DISCUSS DISCHARGE PLANNING AND NEEDS. PT REPORTS 
LIVING AT HOME INDEPENDENTLY AND ALONE.   PT HAS  HOME OXYGEN FROM UNKNOWN 
PROVIDER.  PT USES A CANE TO WALK.  PT HAS HOUSEKEEPING PROVIDED BY HIS 
DISABILITY ACCESSIBLE APARTMENT BUT HAS NO OUTSIDE SERVICES ASSISTING IN THE 
HOME. CM DISCUSSED AVAILABILITY OF HOME HEALTH, REHAB SERVICES AND MEDICAL 
EQUIPMENT. PT REPORTS HE IS NOT ABLE TO TAKE CARE OF HIMSELF AND NEEDS TO GO 
TO A NURSING HOME FOR LONG TERM CARE.  CM DISCUSSED PT'S ALCOHOLISM.  PT 
DENIES NEED FOR TREATMENT AND REPORTS HE ONLY WAS DRINKING THREE SHOTS OF 
VODKA PER DAY, LESS THAN IS IN A [place].  PT STATES HE NEEDS LONG TERM CARE 
AND KNOWS HE CANNOT RETURN HOME AGAIN AND ASKED CM TO GET HIM INTO A [place] NURSING HOME.  LISTING PROVIDED, PT SIGNED CONSENT FOR ANY NURSING 
HOME, ONE IN [place] IF POSSIBLE.  PT REPORTS IT TO BE OK TO COMMUNICATE 
WITH HIS SISTER, ISAIAS WATTERS, IF NEEDED.  PT STATES HE DOES NOT WANT TO 
GO TO [place] TO BE CLOSE TO HER.  IMPORTANT MESSAGE FROM MEDICARE PROVIDED 
AND EXPLAINED.  
  
CM FAXED REFERRALS FOR SKILLED REHAB AND LONG TERM CARE TO [facility], [facility], [facility], [facility], [facility], [facility] AND [facility].  
  
CM WAITING ADMISSION DETERMINATIONS FROM EVERY NURSING FACILITY IN [place].  
  
  
: Vasiliy Harris  
  
Appended by Vasiliy Harris on 2019 15:08 CDT:  
PLANNED EXTERNAL PROVIDER[facility]  [facility]  
  
CM RECEIVED CALL FROM [facility], SPOKE TO SUMAYA WHO INFORMED 
CM THAT THEY WILL ACCEPT PT IF HE WILL AGREE TO NO SMOKING OF CIGARETTES AT 
FACILITY.  CM MET WITH PT AND DISCUSSED PLACEMENT FOR REHAB AND LONG TERM 
CARE AT NON SMOKING FACILITY.  PT AGREES.  CM NOTIFIED SUMAYA AT [facility], 
 
THEY WILL ACCEPT PT AT DISCHARGE FOR SKILLED REHAB.  
  
FOR DISCHARGE, FAX DISCHARGE INFORMATION TO [facility] -[phone].  CALL 
NURSE REPORT TO [facility] [phone].  [facility] TO PROVIDE VAN 
TRANSPORT.  
  
VASILIY HARRIS, CASE MANAGEMENT
 DCPIA - Discharge Planning Initial Assessment
 
Updated by [id number]: Vasiliy Harris on 19  12:18 pm
*  Is the patient Alert and Oriented?
Yes
*  How many steps to enter\exit or inside your home? ELEVATOR *  PCP NONE *  Pharmacy
[facility] BY HARIKA BENITO
*  Preadmission Environment
Home Alone
*  ADLs
Independent
*  Equipment
Oxygen
*  Other Equipment
HOME OXYGEN ONLY- UNKNOWN PROVIDER
*  List name and contact numbers for known caregivers / representatives who 
currently or will assist patient after discharge:
ISAIAS WATTERS, , [phone]
*  Verbal permission to speak to the caregivers and representatives has been 
obtained from the patient.
Yes
*  Community resources currently utilized
Other
*  Please name any agencies selected above.
HOUSEKEEPING AT APARTMENT AS NEEDED.
*  Additional services required to return to the preadmission environment?
Yes
*  Can the patient safely return to the preadmission environment?
Yes
*  Has this patient been hospitalized within the prior 30 days at any 
hospital?
Yes
 
 
 
 
 
Coverage Notice
 
Reviewer: [id number] Harris
 
Notice Issued Date-Time: 2019   9:10
Notice Type: IM Discharge Notice
 
Notice Delivered To: Patient
 
Relationship to Patient: 
Representative Name: 
 
Delivery Method: HAND - Hand Delivered
Rahel Days:
Prior Verbal Notification: 
 
Recipient Understood Notice: Yes
Recipient Signature: Yes
Med Rec Note Co-signed by Attending:
 
Coverage Notice Comment:  
Reviewer: KAILEY Harris
 
Notice Issued Date-Time: 2019   9:10
Notice Type: Patient Choice Letter
 
Notice Delivered To: Patient
Relationship to Patient: 
Representative Name: 
 
Delivery Method: HAND - Hand Delivered
Rahel Days:
Prior Verbal Notification: 
 
Recipient Understood Notice: Yes
Recipient Signature: Yes
Med Rec Note Co-signed by Attending:
 
Coverage Notice Comment:  ANY NURSING HOME, IN [place] IF POSSIBLE
 
Last DP export: 19   2:09 pm
Patient Name: SHERI BLAKE
Encounter #: [id number]
Page [id number]
 
 
 
 
 
Electronically Signed by MERVAT GRANGER on 04/10/19 at 1243
 
 
 
 
 
 
**All edits/amendments must be made on the electronic document**
 
DICTATION DATE: 04/10/19 1242     : CHANDRAKANT  04/10/19 1242     
RPT#: [id number]                                DC DATE:04/10/19
                                               STATUS: DIS IN  
[facility]
[address] [place] [address]
***END OF REPORT***

## 2019-06-09 ENCOUNTER — HOSPITAL ENCOUNTER (INPATIENT)
Dept: HOSPITAL 84 - D.ER | Age: 64
LOS: 6 days | Discharge: NURSING, REHAB OR LONG-TERM CARE | DRG: 682 | End: 2019-06-15
Attending: INTERNAL MEDICINE | Admitting: INTERNAL MEDICINE
Payer: MEDICARE

## 2019-06-09 VITALS — DIASTOLIC BLOOD PRESSURE: 71 MMHG | SYSTOLIC BLOOD PRESSURE: 140 MMHG

## 2019-06-09 VITALS — SYSTOLIC BLOOD PRESSURE: 129 MMHG | DIASTOLIC BLOOD PRESSURE: 68 MMHG

## 2019-06-09 VITALS — SYSTOLIC BLOOD PRESSURE: 114 MMHG | DIASTOLIC BLOOD PRESSURE: 56 MMHG

## 2019-06-09 VITALS
BODY MASS INDEX: 19.64 KG/M2 | WEIGHT: 140.29 LBS | BODY MASS INDEX: 19.64 KG/M2 | HEIGHT: 71 IN | WEIGHT: 140.29 LBS | HEIGHT: 71 IN | BODY MASS INDEX: 19.64 KG/M2

## 2019-06-09 VITALS — DIASTOLIC BLOOD PRESSURE: 62 MMHG | SYSTOLIC BLOOD PRESSURE: 136 MMHG

## 2019-06-09 VITALS — SYSTOLIC BLOOD PRESSURE: 136 MMHG | DIASTOLIC BLOOD PRESSURE: 72 MMHG

## 2019-06-09 VITALS — DIASTOLIC BLOOD PRESSURE: 67 MMHG | SYSTOLIC BLOOD PRESSURE: 124 MMHG

## 2019-06-09 DIAGNOSIS — N17.9: Primary | ICD-10-CM

## 2019-06-09 DIAGNOSIS — E83.52: ICD-10-CM

## 2019-06-09 DIAGNOSIS — E03.9: ICD-10-CM

## 2019-06-09 DIAGNOSIS — F10.27: ICD-10-CM

## 2019-06-09 DIAGNOSIS — N18.9: ICD-10-CM

## 2019-06-09 DIAGNOSIS — J44.9: ICD-10-CM

## 2019-06-09 DIAGNOSIS — I50.31: ICD-10-CM

## 2019-06-09 DIAGNOSIS — A41.9: ICD-10-CM

## 2019-06-09 DIAGNOSIS — D64.9: ICD-10-CM

## 2019-06-09 LAB
ALBUMIN SERPL-MCNC: 3.3 G/DL (ref 3.4–5)
ALP SERPL-CCNC: 89 U/L (ref 46–116)
ALT SERPL-CCNC: 24 U/L (ref 10–68)
ANION GAP SERPL CALC-SCNC: 9.5 MMOL/L (ref 8–16)
BASOPHILS NFR BLD AUTO: 0.2 % (ref 0–2)
BILIRUB SERPL-MCNC: 0.33 MG/DL (ref 0.2–1.3)
BUN SERPL-MCNC: 97 MG/DL (ref 7–18)
CALCIUM SERPL-MCNC: 14.5 MG/DL (ref 8.5–10.1)
CHLORIDE SERPL-SCNC: 98 MMOL/L (ref 98–107)
CK MB SERPL-MCNC: 6.9 U/L (ref 0–3.6)
CK SERPL-CCNC: 51 UL (ref 21–232)
CO2 SERPL-SCNC: 34.7 MMOL/L (ref 21–32)
CREAT SERPL-MCNC: 3.2 MG/DL (ref 0.6–1.3)
EOSINOPHIL NFR BLD: 0.8 % (ref 0–7)
ERYTHROCYTE [DISTWIDTH] IN BLOOD BY AUTOMATED COUNT: 15.8 % (ref 11.5–14.5)
GLOBULIN SER-MCNC: 4.1 G/L
GLUCOSE SERPL-MCNC: 102 MG/DL (ref 74–106)
HCT VFR BLD CALC: 39.5 % (ref 42–54)
HGB BLD-MCNC: 13.2 G/DL (ref 13.5–17.5)
IMM GRANULOCYTES NFR BLD: 1.3 % (ref 0–5)
LYMPHOCYTES NFR BLD AUTO: 13.2 % (ref 15–50)
MAGNESIUM SERPL-MCNC: 2.6 MG/DL (ref 1.8–2.4)
MCH RBC QN AUTO: 30.1 PG (ref 26–34)
MCHC RBC AUTO-ENTMCNC: 33.4 G/DL (ref 31–37)
MCV RBC: 90 FL (ref 80–100)
MONOCYTES NFR BLD: 8.9 % (ref 2–11)
NEUTROPHILS NFR BLD AUTO: 75.6 % (ref 40–80)
OSMOLALITY SERPL CALC.SUM OF ELEC: 303 MOSM/KG (ref 275–300)
PHOSPHATE SERPL-MCNC: 6.3 MG/DL (ref 2.5–4.9)
PLATELET # BLD: 382 10X3/UL (ref 130–400)
PMV BLD AUTO: 9.1 FL (ref 7.4–10.4)
POTASSIUM SERPL-SCNC: 5.2 MMOL/L (ref 3.5–5.1)
PROT SERPL-MCNC: 7.4 G/DL (ref 6.4–8.2)
RBC # BLD AUTO: 4.39 10X6/UL (ref 4.2–6.1)
SODIUM SERPL-SCNC: 137 MMOL/L (ref 136–145)
TROPONIN I SERPL-MCNC: 0.41 NG/ML (ref 0–0.06)
TSH SERPL-ACNC: 4.28 UIU/ML (ref 0.36–3.74)
WBC # BLD AUTO: 13.4 10X3/UL (ref 4.8–10.8)

## 2019-06-09 NOTE — NUR
AFTER HEARING LOUD NOISE FROM PT ROOM, FAWAD BLAKE RN ATTEMPTED TO ENTER PT
ROOM BUT WAS UNABLE TO ENTER ROOM AS PT WAS LEANING UPON DOOR. AFTER ENTERING
ROOM PT WAS FOUND LAYING ON RIGHT SIDE IN FLOOR. THE PT HAD ATTEMPTED TO STAND
USING HIS IV POLE AND FELL TO THE GROUND. AFTER FURTHER INSPECTION, THE POSEY
ALARM HAD BEEN SET INTO THE ON POSITION BUT THE BATTERY NO LONGER HELD A
CHARGE, THEREFORE THERE WAS NO SOUND ALERTED WHEN PT EXITED HIS BED. PT WAS
ASSISTED BACK TO BED BY ALEKSANDR CHOW RN AND DEBBIE CASTRO LPN. THE OLD MERLE
ALARM WAS REMOVED FROM THE ROOM AND TURNED INTO HOUSE SUPERVISOR, LESLI GILBERT. THE NEW MERLE ALARM WAS PLACED AND ACTIVATED. DR LEON WAS NOTIFIED
OF THE FALL AND ORDERED A STAT HEAD CT AS WELL AS FALL PRECAUTIONS TO BE PUT
INTO PLACE. THE PT'S SISTER ISAIAS WAS NOTIFIED BY PHONE CALL.

## 2019-06-10 VITALS — DIASTOLIC BLOOD PRESSURE: 69 MMHG | SYSTOLIC BLOOD PRESSURE: 121 MMHG

## 2019-06-10 VITALS — DIASTOLIC BLOOD PRESSURE: 64 MMHG | SYSTOLIC BLOOD PRESSURE: 135 MMHG

## 2019-06-10 VITALS — DIASTOLIC BLOOD PRESSURE: 60 MMHG | SYSTOLIC BLOOD PRESSURE: 138 MMHG

## 2019-06-10 VITALS — DIASTOLIC BLOOD PRESSURE: 55 MMHG | SYSTOLIC BLOOD PRESSURE: 135 MMHG

## 2019-06-10 LAB
ALBUMIN SERPL-MCNC: 2.8 G/DL (ref 3.4–5)
ALP SERPL-CCNC: 73 U/L (ref 46–116)
ALT SERPL-CCNC: 21 U/L (ref 10–68)
ANION GAP SERPL CALC-SCNC: 9.1 MMOL/L (ref 8–16)
APPEARANCE UR: CLEAR
BASOPHILS NFR BLD AUTO: 0.2 % (ref 0–2)
BILIRUB SERPL-MCNC: 0.38 MG/DL (ref 0.2–1.3)
BILIRUB SERPL-MCNC: NEGATIVE MG/DL
BUN SERPL-MCNC: 96 MG/DL (ref 7–18)
CALCIUM SERPL-MCNC: 13.3 MG/DL (ref 8.5–10.1)
CHLORIDE SERPL-SCNC: 100 MMOL/L (ref 98–107)
CO2 SERPL-SCNC: 34.5 MMOL/L (ref 21–32)
COLOR UR: YELLOW
CREAT SERPL-MCNC: 3.4 MG/DL (ref 0.6–1.3)
EOSINOPHIL NFR BLD: 1 % (ref 0–7)
ERYTHROCYTE [DISTWIDTH] IN BLOOD BY AUTOMATED COUNT: 15.5 % (ref 11.5–14.5)
GLOBULIN SER-MCNC: 4.2 G/L
GLUCOSE SERPL-MCNC: 89 MG/DL (ref 74–106)
GLUCOSE SERPL-MCNC: NEGATIVE MG/DL
HCT VFR BLD CALC: 34.9 % (ref 42–54)
HGB BLD-MCNC: 11.7 G/DL (ref 13.5–17.5)
IMM GRANULOCYTES NFR BLD: 1.2 % (ref 0–5)
IRON SERPL-MCNC: 50 UG/DL (ref 35–150)
KETONES UR STRIP-MCNC: NEGATIVE MG/DL
LYMPHOCYTES NFR BLD AUTO: 15.1 % (ref 15–50)
MAGNESIUM SERPL-MCNC: 2.4 MG/DL (ref 1.8–2.4)
MCH RBC QN AUTO: 29.6 PG (ref 26–34)
MCHC RBC AUTO-ENTMCNC: 33.5 G/DL (ref 31–37)
MCV RBC: 88.4 FL (ref 80–100)
MONOCYTES NFR BLD: 7.9 % (ref 2–11)
NEUTROPHILS NFR BLD AUTO: 74.6 % (ref 40–80)
NITRITE UR-MCNC: NEGATIVE MG/ML
OSMOLALITY SERPL CALC.SUM OF ELEC: 306 MOSM/KG (ref 275–300)
PH UR STRIP: 7 [PH] (ref 5–6)
PHOSPHATE SERPL-MCNC: 5.8 MG/DL (ref 2.5–4.9)
PLATELET # BLD: 345 10X3/UL (ref 130–400)
PMV BLD AUTO: 9.1 FL (ref 7.4–10.4)
POTASSIUM SERPL-SCNC: 4.6 MMOL/L (ref 3.5–5.1)
PROT SERPL-MCNC: 7 G/DL (ref 6.4–8.2)
PROT UR-MCNC: NEGATIVE MG/DL
RBC # BLD AUTO: 3.95 10X6/UL (ref 4.2–6.1)
SAO2 % BLD FROM PO2: 22 % (ref 15–55)
SODIUM SERPL-SCNC: 139 MMOL/L (ref 136–145)
SP GR UR STRIP: 1.01 (ref 1–1.02)
TIBC SERPL-MCNC: 221 UG/DL (ref 260–445)
UIBC SERPL-MCNC: 171 UG/DL (ref 150–375)
UROBILINOGEN UR-MCNC: NORMAL MG/DL
WBC # BLD AUTO: 12.9 10X3/UL (ref 4.8–10.8)

## 2019-06-10 NOTE — NUR
DR HIGGINBOTHAM HERE AND WANTED A BLADDER SCAN DONE. THIS WAS DONE WITH RESULT OF 0
AND REPORTED TO HIM.

## 2019-06-10 NOTE — NUR
REPORTED HE STATED HE COULDNT BREATHE. I WENT IN AND HE HAD HIS OXYGEN IN
PLACE. O2 SAT WAS 96% BBS ARE CLEAR. NO CHANGE IN RESP STATUS. HE BECAME CALM
AFTER WORKING WITH HIM TO BREATHE IN HIS NOSE AND OUT HIS MOUTH.

## 2019-06-10 NOTE — NUR
LYING IN BED RESTING WITH EASE. AROUSES TO VERBAL STIMULI. NEURO CHECKS
INTACT. FALL ALARM IS ON AND CL IN REACH. IV SITE IS CLEAR INFUSING WITHOUT
DIFFICULTY.

## 2019-06-10 NOTE — NUR
PT SITTING UP IN BED WITH EYES OPEN, RR EVEN AND UNLABORED. POSEY ALARM TURNED
ON AND IN PLACE BENEATH PT. NO S/S OF DISTRESS NOTED. CALL LIGHT IN REACH, 72
PACED ON TELEMETRY. WILL CTM.

## 2019-06-10 NOTE — NUR
INCONTINENT OF URINE. CARE GIVEN AND CLEAN PAD AND PULL UP APPLIED. HE HAS NO
CHANGE IN NEURO STATUS, STILL CONFUSED STATES I AM NOT STAYING HERE TONIGHT.
HE HAD HIS CARDIAC MONITOR OFF IN HIS HAND AND IT WAS REPLACED. HE WAS
COOPERATIVE WITH CARE. REORIENT AND CONTINUES TO HAVE POSEY/BED ALARM ON.

## 2019-06-10 NOTE — NUR
HE WAS ATTEMPTING TO GET OUT OF BED REQUESTED PEE JUG THEN COKE THEN BEER. DID
NOT USE THE URINAL WHEN OFFERED DENIED NEEDING TO GO. HE WAS ASSISTED UP BY
CNA'S EARLIER AND WENT TO BATHROOM AND HAD BM. I DID GIVE HIM A COKE.

## 2019-06-10 NOTE — NUR
DRESSED SKIN TEAR ON LEFT ELBOW WITH PETROLEUM DRESSING AND COVERED WITH
KERLIX DRESSING. SLIGHT RED DRAINAGE NOTED.

## 2019-06-10 NOTE — NUR
INCONTINENT CARE GIVEN. NO CHANGE IN NEURO STATUS NOTED. REMAINS CONFUSED WITH
REORIENTATION OFTEN. IV SITE CLEAR WITH BANANA BAG INFUSING. CL IN REACH.

## 2019-06-10 NOTE — NUR
EATING LUNCH AT THIS TIME. HE GOT CONFUSED AND GOT UP OUT OF BED AND WAS
STANDING BESIDE IN WHEN PHYSICAL THERAPY HELPED HIM GET BACK IN BED, BED ALARM
IS ON BUT HE THOUGHT IT WAS TIME TO GO HOME. REORIENT AT THIS TIME. NO CHANGE
IN NEURO STATUS.

## 2019-06-11 VITALS — DIASTOLIC BLOOD PRESSURE: 71 MMHG | SYSTOLIC BLOOD PRESSURE: 143 MMHG

## 2019-06-11 VITALS — SYSTOLIC BLOOD PRESSURE: 120 MMHG | DIASTOLIC BLOOD PRESSURE: 74 MMHG

## 2019-06-11 VITALS — SYSTOLIC BLOOD PRESSURE: 159 MMHG | DIASTOLIC BLOOD PRESSURE: 79 MMHG

## 2019-06-11 VITALS — DIASTOLIC BLOOD PRESSURE: 71 MMHG | SYSTOLIC BLOOD PRESSURE: 133 MMHG

## 2019-06-11 VITALS — DIASTOLIC BLOOD PRESSURE: 52 MMHG | SYSTOLIC BLOOD PRESSURE: 121 MMHG

## 2019-06-11 VITALS — DIASTOLIC BLOOD PRESSURE: 61 MMHG | SYSTOLIC BLOOD PRESSURE: 123 MMHG

## 2019-06-11 LAB
ALBUMIN SERPL-MCNC: 3.1 G/DL (ref 3.4–5)
ALP SERPL-CCNC: 78 U/L (ref 46–116)
ALT SERPL-CCNC: 22 U/L (ref 10–68)
ANION GAP SERPL CALC-SCNC: 13.8 MMOL/L (ref 8–16)
BASOPHILS NFR BLD AUTO: 0.2 % (ref 0–2)
BILIRUB SERPL-MCNC: 0.35 MG/DL (ref 0.2–1.3)
BUN SERPL-MCNC: 91 MG/DL (ref 7–18)
CALCIUM SERPL-MCNC: 12 MG/DL (ref 8.5–10.1)
CHLORIDE SERPL-SCNC: 101 MMOL/L (ref 98–107)
CO2 SERPL-SCNC: 30.9 MMOL/L (ref 21–32)
CREAT SERPL-MCNC: 3.5 MG/DL (ref 0.6–1.3)
EOSINOPHIL NFR BLD: 1.4 % (ref 0–7)
ERYTHROCYTE [DISTWIDTH] IN BLOOD BY AUTOMATED COUNT: 15.9 % (ref 11.5–14.5)
FOLATE SERPL-MCNC: >20 NG/ML (ref 3–?)
GLOBULIN SER-MCNC: 4.1 G/L
GLUCOSE SERPL-MCNC: 90 MG/DL (ref 74–106)
HCT VFR BLD CALC: 36.9 % (ref 42–54)
HGB BLD-MCNC: 12.5 G/DL (ref 13.5–17.5)
IMM GRANULOCYTES NFR BLD: 1.7 % (ref 0–5)
LYMPHOCYTES NFR BLD AUTO: 14.2 % (ref 15–50)
MAGNESIUM SERPL-MCNC: 2.9 MG/DL (ref 1.8–2.4)
MCH RBC QN AUTO: 30.1 PG (ref 26–34)
MCHC RBC AUTO-ENTMCNC: 33.9 G/DL (ref 31–37)
MCV RBC: 88.9 FL (ref 80–100)
MONOCYTES NFR BLD: 7.2 % (ref 2–11)
NEUTROPHILS NFR BLD AUTO: 75.3 % (ref 40–80)
OSMOLALITY SERPL CALC.SUM OF ELEC: 308 MOSM/KG (ref 275–300)
PHOSPHATE SERPL-MCNC: 5.7 MG/DL (ref 2.5–4.9)
PLATELET # BLD: 342 10X3/UL (ref 130–400)
PMV BLD AUTO: 9 FL (ref 7.4–10.4)
POTASSIUM SERPL-SCNC: 4.7 MMOL/L (ref 3.5–5.1)
PROT SERPL-MCNC: 7.2 G/DL (ref 6.4–8.2)
RBC # BLD AUTO: 4.15 10X6/UL (ref 4.2–6.1)
SODIUM SERPL-SCNC: 141 MMOL/L (ref 136–145)
VIT B12 SERPL-MCNC: 314 PG/ML (ref 232–1245)
WBC # BLD AUTO: 11.4 10X3/UL (ref 4.8–10.8)

## 2019-06-11 NOTE — NUR
RECEIVED REPORT. ASSUMED CARE OF PATIENT. PATIENT WITH EYES OPEN. PATIENT
NOTED TO HAVE TREMORS. PATIENT ASKING FOR SANDWICH. ORANGE JUICE PROVIDED AS
NO SANDWICH ON UNIT. RESP EVEN AND UNLABORED. NO IV ACCESS AT THIS TIME. CALL
LIGHT PLACED WITHIN REACH. NO DISTRESS.

## 2019-06-11 NOTE — NUR
MEDICATED FOR DELIRUM TREMORS AT THIS TIME. CALL LIGHT WITHIN REACH. SR UP FOR
SAFETY. BED ALARM PATENT.

## 2019-06-11 NOTE — NUR
RESTING QUIELTY WITH NO DISTRESS NOTED. O2 @ 2L PER NC ON. RESP UNLABORED. CL
IN REACH. FALL PRECAUTIONS IN PLACE.

## 2019-06-11 NOTE — NUR
ASSISTED KEIRA SCHNEIDER TO CHAIR AT BEDSIDE. INCONTINENT CARES PROVIDED. PATIENT
CONFUSED BUT FOLLOWING DIRECTIONS. CALL LIGHT WITHIN REACH. MERLE MAT TO
CHAIR.

## 2019-06-11 NOTE — NUR
PATIENT CONTINUES TO TRY TO GET OUT OF CHAIR UNASSISTED. CALL LIGHT WITHINH
REACH BUT WILL NOT USE IT. PATIENT FOLLOWS COMMANDS WHEN NURSE AT BEDSIDE BUT
MEMORY SPAN SEEMS TO BE LESS THAN 2 MINUTES ONCE NURSE LEAVES THE ROOM.
PATIENT NEEDS ONE ON ONE SUPERVISION TO PREVENT FALL AND KEEP PATIENT SAFE.
ONE ON ONE NOT ABLE TO BE PROVIDED AT THIS TIME. CALL LIGHT WITHIN REACH.A

## 2019-06-11 NOTE — NUR
PATIENTS MAGNESIUM LEVEL IS ELEVATED THIS AM AND PATIENT IS NOW SCHEDULED TO
HAVE A BANANA BAG WITH MAGESIUM IN IT. PATIENTS MAG LEVEL INCREASED AFTER
BANANA BAG YESTERDAY. NEW ORDERS FROM CYN TO DRAW MAG LEVEL NOW AND IF MAG
IS STILL ELEVATED, DO NOT GIVE BANANA BAG. PHARMACY SUGGESTED TO D/C BANANA
BAG IF MAG IS STILL ELEVATED. AWAITING FOR LAB RESULT NOW.

## 2019-06-11 NOTE — NUR
22 GAUGE IV PLACED X 1 STICK TO RIGHT FOREARM. TOLERATED IV PLACEMENT WELL.
TAPED, DATED, AND SECURED. NO DISTRESS.

## 2019-06-12 VITALS — DIASTOLIC BLOOD PRESSURE: 60 MMHG | SYSTOLIC BLOOD PRESSURE: 110 MMHG

## 2019-06-12 VITALS — SYSTOLIC BLOOD PRESSURE: 132 MMHG | DIASTOLIC BLOOD PRESSURE: 66 MMHG

## 2019-06-12 VITALS — SYSTOLIC BLOOD PRESSURE: 112 MMHG | DIASTOLIC BLOOD PRESSURE: 59 MMHG

## 2019-06-12 VITALS — DIASTOLIC BLOOD PRESSURE: 62 MMHG | SYSTOLIC BLOOD PRESSURE: 109 MMHG

## 2019-06-12 VITALS — DIASTOLIC BLOOD PRESSURE: 68 MMHG | SYSTOLIC BLOOD PRESSURE: 125 MMHG

## 2019-06-12 VITALS — SYSTOLIC BLOOD PRESSURE: 128 MMHG | DIASTOLIC BLOOD PRESSURE: 53 MMHG

## 2019-06-12 LAB
ALBUMIN SERPL-MCNC: 2.6 G/DL (ref 3.4–5)
ALP SERPL-CCNC: 60 U/L (ref 46–116)
ALT SERPL-CCNC: 19 U/L (ref 10–68)
ANION GAP SERPL CALC-SCNC: 12.9 MMOL/L (ref 8–16)
BASOPHILS NFR BLD AUTO: 0.2 % (ref 0–2)
BILIRUB SERPL-MCNC: 0.37 MG/DL (ref 0.2–1.3)
BUN SERPL-MCNC: 88 MG/DL (ref 7–18)
CALCIUM SERPL-MCNC: 10.6 MG/DL (ref 8.5–10.1)
CHLORIDE SERPL-SCNC: 103 MMOL/L (ref 98–107)
CO2 SERPL-SCNC: 27.1 MMOL/L (ref 21–32)
CREAT SERPL-MCNC: 3.6 MG/DL (ref 0.6–1.3)
EOSINOPHIL NFR BLD: 1.6 % (ref 0–7)
ERYTHROCYTE [DISTWIDTH] IN BLOOD BY AUTOMATED COUNT: 15.8 % (ref 11.5–14.5)
GLOBULIN SER-MCNC: 3.8 G/L
GLUCOSE SERPL-MCNC: 96 MG/DL (ref 74–106)
HCT VFR BLD CALC: 30.4 % (ref 42–54)
HGB BLD-MCNC: 10.2 G/DL (ref 13.5–17.5)
IMM GRANULOCYTES NFR BLD: 1.4 % (ref 0–5)
LYMPHOCYTES NFR BLD AUTO: 12.7 % (ref 15–50)
MAGNESIUM SERPL-MCNC: 2.6 MG/DL (ref 1.8–2.4)
MCH RBC QN AUTO: 29.7 PG (ref 26–34)
MCHC RBC AUTO-ENTMCNC: 33.6 G/DL (ref 31–37)
MCV RBC: 88.4 FL (ref 80–100)
MONOCYTES NFR BLD: 7.2 % (ref 2–11)
NEUTROPHILS NFR BLD AUTO: 76.9 % (ref 40–80)
OSMOLALITY SERPL CALC.SUM OF ELEC: 304 MOSM/KG (ref 275–300)
PHOSPHATE SERPL-MCNC: 5.1 MG/DL (ref 2.5–4.9)
PLATELET # BLD: 354 10X3/UL (ref 130–400)
PMV BLD AUTO: 9.1 FL (ref 7.4–10.4)
POTASSIUM SERPL-SCNC: 4 MMOL/L (ref 3.5–5.1)
PROT SERPL-MCNC: 6.4 G/DL (ref 6.4–8.2)
RBC # BLD AUTO: 3.44 10X6/UL (ref 4.2–6.1)
SODIUM SERPL-SCNC: 139 MMOL/L (ref 136–145)
WBC # BLD AUTO: 9.8 10X3/UL (ref 4.8–10.8)

## 2019-06-12 NOTE — MORECARE
CASE MANAGEMENT DISCHARGE SUMMARY
 
 
PATIENT: SHERI BLAKE                     UNIT: [id number]
ACCOUNT#: [id number]                       ADM DATE: 19
AGE: 64     : 53  SEX: M            ROOM/BED: D.2103    
AUTHOR: MERVAT GRANGER                             PHYSICIAN:                               
 
REFERRING PHYSICIAN: ANAY LEON MD               
DATE OF SERVICE: 19
Discharge Plan
 
 
Patient Name: SHERI BLAKE
[facility]
Encounter #: [id number]
Medical Record #: [id number]
: 1955
Planned Disposition: Long Term Care Fac MCR
Anticipated Discharge Date: 
 
Discharge Date: 
Expected LOS: 
Initial Reviewer: [email or web address]
Initial Review Date: 2019
Generated: 19   7:25 pm 
  
 
 
 
 
 
 
 
Patient Name: SHERI BLAKE
 
Encounter #: [id number]
Page [id number]
 
 
 
 
 
Electronically Signed by MERVAT GRANGER on 19 at 1825
 
 
 
 
 
 
**All edits/amendments must be made on the electronic document**
 
DICTATION DATE: 19     : CHANDRAKANT  19     
[facility]#: [id number]                                DC DATE:        
                                               STATUS: ADM IN  
[facility]
[address] [place] [address]
***END OF REPORT***

## 2019-06-12 NOTE — NUR
SPOKE WITH CYN WARREN REGARDING PT MG LEVEL IS 2.8 BUT PT IS SCHEDULED TO GET
A BANANA BAG. PER CYN, OK TO HOLD LAVONIGHT

## 2019-06-12 NOTE — NUR
RESUMING PT CARE, PT LAYING IN BED WITH EYES CLOSED, RESPIRATIONS EVEN AND
UNLABORED. CALL LIGHT IN REACH, WILL CONTINUE TO MONITOR AND FOLLOW PLAN OF
CARE.

## 2019-06-12 NOTE — MORECARE
CASE MANAGEMENT DISCHARGE SUMMARY
 
 
PATIENT: SHERI BLAKE                     UNIT: [id number]
ACCOUNT#: [id number]                       ADM DATE: 19
AGE: 64     : 53  SEX: M            ROOM/BED: D.2103    
AUTHOR: MERVAT GRANGER                             PHYSICIAN:                               
 
REFERRING PHYSICIAN: ANAY LEON MD               
DATE OF SERVICE: 19
Discharge Plan
 
 
Patient Name: SHERI BLAKE
[facility]
Encounter #: [id number]
Medical Record #: [id number]
: 1955
Planned Disposition: Long Term Care Fac MCR
Anticipated Discharge Date: 
 
Discharge Date: 
Expected LOS: 
Initial Reviewer: [email or web address]
Initial Review Date: 2019
Generated: 19   7:32 pm 
  
 
 
 
 
 
 
 
 
Last DP export: 19   5:25 p
Patient Name: SHERI BLAKE
 
Encounter #: [id number]
Page [id number]
 
 
 
 
 
Electronically Signed by MERVAT GRANGER on 19 at 1832
 
 
 
 
 
 
**All edits/amendments must be made on the electronic document**
 
DICTATION DATE: 19     : CHANDRAKANT  19     
RPT#: [id number]                                DC DATE:        
                                               STATUS: ADM IN  
[facility]
[address] [place] [address]
***END OF REPORT***

## 2019-06-12 NOTE — MORECARE
CASE MANAGEMENT DISCHARGE SUMMARY
 
 
PATIENT: SHERI BLAKE                     UNIT: [id number]
ACCOUNT#: [id number]                       ADM DATE: 19
AGE: 64     : 53  SEX: M            ROOM/BED: D.2103    
AUTHOR: MERVAT GRANGER                             PHYSICIAN:                               
 
REFERRING PHYSICIAN: ANAY LEON MD               
DATE OF SERVICE: 19
Discharge Plan
 
 
Patient Name: SHERI BLAKE
[facility]
Encounter #: [id number]
Medical Record #: [id number]
: 1955
Planned Disposition: Long Term Care Fac MCR
Anticipated Discharge Date: 
 
Discharge Date: 
Expected LOS: 
Initial Reviewer: [id number]
Initial Review Date: 2019
Generated: 19   7:39 pm 
Comments
 
DCP- Discharge Planning
 
Updated by [id number]: Princess Gomes on 19   5:35 pm CT
Late Entry 1000  
CM to patient's bedside to speak with the patient regarding discharge 
planning. He was very sedated. CM spoke with the primary nurse. Patient had 
been sedated with Ativan for tremors and restlessness.  
Revisited this afternoon. He is arousable but CM unable to discuss dcp.  
TC to [facility]. Spoke w/ Virgen. She stated the patient is 
in long term care.  
They are expecting him to return at discharge.   
Clinical update faxed as per request. Clinical faxed to [phone].  
CM to follow to assist with discharge planning.  
[facility] contact phone number- [phone].
  
 
 
 
 
 
 
 
 
 
Last DP export: 19   5:32 p
Patient Name: SHERI BLAKE
Encounter #: [id number]
Page [id number]
 
 
 
 
 
Electronically Signed by MERVAT GRANGER on 19 at 1840
 
 
 
 
 
 
**All edits/amendments must be made on the electronic document**
 
DICTATION DATE: 19     : CHANDRAKANT  19     
RPT#: [id number]                                DC DATE:        
                                               STATUS: ADM IN  
[facility]
 [place] [address]
***END OF REPORT***

## 2019-06-13 VITALS — DIASTOLIC BLOOD PRESSURE: 58 MMHG | SYSTOLIC BLOOD PRESSURE: 121 MMHG

## 2019-06-13 VITALS — SYSTOLIC BLOOD PRESSURE: 126 MMHG | DIASTOLIC BLOOD PRESSURE: 72 MMHG

## 2019-06-13 VITALS — DIASTOLIC BLOOD PRESSURE: 48 MMHG | SYSTOLIC BLOOD PRESSURE: 98 MMHG

## 2019-06-13 VITALS — SYSTOLIC BLOOD PRESSURE: 134 MMHG | DIASTOLIC BLOOD PRESSURE: 55 MMHG

## 2019-06-13 VITALS — DIASTOLIC BLOOD PRESSURE: 60 MMHG | SYSTOLIC BLOOD PRESSURE: 107 MMHG

## 2019-06-13 VITALS — DIASTOLIC BLOOD PRESSURE: 64 MMHG | SYSTOLIC BLOOD PRESSURE: 119 MMHG

## 2019-06-13 LAB
ALBUMIN SERPL-MCNC: 2.7 G/DL (ref 3.4–5)
ALP SERPL-CCNC: 65 U/L (ref 46–116)
ALT SERPL-CCNC: 21 U/L (ref 10–68)
ANION GAP SERPL CALC-SCNC: 11.4 MMOL/L (ref 8–16)
BASOPHILS NFR BLD AUTO: 0.2 % (ref 0–2)
BILIRUB SERPL-MCNC: 0.36 MG/DL (ref 0.2–1.3)
BUN SERPL-MCNC: 77 MG/DL (ref 7–18)
CALCIUM SERPL-MCNC: 10.1 MG/DL (ref 8.5–10.1)
CHLORIDE SERPL-SCNC: 105 MMOL/L (ref 98–107)
CO2 SERPL-SCNC: 29.3 MMOL/L (ref 21–32)
CREAT SERPL-MCNC: 3.2 MG/DL (ref 0.6–1.3)
EOSINOPHIL NFR BLD: 1.5 % (ref 0–7)
ERYTHROCYTE [DISTWIDTH] IN BLOOD BY AUTOMATED COUNT: 16 % (ref 11.5–14.5)
GLOBULIN SER-MCNC: 4.2 G/L
GLUCOSE SERPL-MCNC: 95 MG/DL (ref 74–106)
HCT VFR BLD CALC: 33.6 % (ref 42–54)
HGB BLD-MCNC: 10.9 G/DL (ref 13.5–17.5)
IMM GRANULOCYTES NFR BLD: 0.7 % (ref 0–5)
LYMPHOCYTES NFR BLD AUTO: 14.3 % (ref 15–50)
MAGNESIUM SERPL-MCNC: 2.6 MG/DL (ref 1.8–2.4)
MCH RBC QN AUTO: 29 PG (ref 26–34)
MCHC RBC AUTO-ENTMCNC: 32.4 G/DL (ref 31–37)
MCV RBC: 89.4 FL (ref 80–100)
MONOCYTES NFR BLD: 7 % (ref 2–11)
NEUTROPHILS NFR BLD AUTO: 76.3 % (ref 40–80)
OSMOLALITY SERPL CALC.SUM OF ELEC: 305 MOSM/KG (ref 275–300)
PHOSPHATE SERPL-MCNC: 4.6 MG/DL (ref 2.5–4.9)
PLATELET # BLD: 341 10X3/UL (ref 130–400)
PMV BLD AUTO: 9.1 FL (ref 7.4–10.4)
POTASSIUM SERPL-SCNC: 3.7 MMOL/L (ref 3.5–5.1)
PROT SERPL-MCNC: 6.9 G/DL (ref 6.4–8.2)
RBC # BLD AUTO: 3.76 10X6/UL (ref 4.2–6.1)
SODIUM SERPL-SCNC: 142 MMOL/L (ref 136–145)
WBC # BLD AUTO: 9.8 10X3/UL (ref 4.8–10.8)

## 2019-06-13 NOTE — NUR
PT FOUND TRYING TO GET OUT OF BED WHEN POSEY ALARM WENT OFF. HELPED PT BACK IN
BED AND EDUCATE PT ON THE NEED TO TO STAY IN BED. PROVIDED URINAL PER PT
REQUEST. PT HAS PUT OUT A TOTAL OF 600CC'S AT THIS TIME. WILL CTM.

## 2019-06-13 NOTE — NUR
ROUNDING DONE WITH PATIENT BEING ON MERLE ALARM MAT, ON. NASAL CANNULA IS IN
MOUTH AT THIS TIME. LEFT FA SEEN WITH DRY, INTACT TOYA. ON HEART MONITOR
SHOWING PACED, HR 77. RIGHT FA PIV SEEN WITH SALINE LOCK.

## 2019-06-14 VITALS — SYSTOLIC BLOOD PRESSURE: 112 MMHG | DIASTOLIC BLOOD PRESSURE: 58 MMHG

## 2019-06-14 VITALS — DIASTOLIC BLOOD PRESSURE: 59 MMHG | SYSTOLIC BLOOD PRESSURE: 108 MMHG

## 2019-06-14 VITALS — DIASTOLIC BLOOD PRESSURE: 53 MMHG | SYSTOLIC BLOOD PRESSURE: 122 MMHG

## 2019-06-14 VITALS — SYSTOLIC BLOOD PRESSURE: 83 MMHG | DIASTOLIC BLOOD PRESSURE: 40 MMHG

## 2019-06-14 VITALS — SYSTOLIC BLOOD PRESSURE: 131 MMHG | DIASTOLIC BLOOD PRESSURE: 75 MMHG

## 2019-06-14 VITALS — SYSTOLIC BLOOD PRESSURE: 154 MMHG | DIASTOLIC BLOOD PRESSURE: 76 MMHG

## 2019-06-14 LAB
ALBUMIN SERPL-MCNC: 2.4 G/DL (ref 3.4–5)
ALP SERPL-CCNC: 56 U/L (ref 46–116)
ALT SERPL-CCNC: 16 U/L (ref 10–68)
ANION GAP SERPL CALC-SCNC: 11 MMOL/L (ref 8–16)
BASOPHILS NFR BLD AUTO: 0.3 % (ref 0–2)
BILIRUB SERPL-MCNC: 0.26 MG/DL (ref 0.2–1.3)
BUN SERPL-MCNC: 67 MG/DL (ref 7–18)
CALCIUM SERPL-MCNC: 9.3 MG/DL (ref 8.5–10.1)
CHLORIDE SERPL-SCNC: 107 MMOL/L (ref 98–107)
CO2 SERPL-SCNC: 27.4 MMOL/L (ref 21–32)
CREAT SERPL-MCNC: 3 MG/DL (ref 0.6–1.3)
EOSINOPHIL NFR BLD: 1.3 % (ref 0–7)
ERYTHROCYTE [DISTWIDTH] IN BLOOD BY AUTOMATED COUNT: 15.5 % (ref 11.5–14.5)
GLOBULIN SER-MCNC: 3.6 G/L
GLUCOSE SERPL-MCNC: 106 MG/DL (ref 74–106)
HCT VFR BLD CALC: 29.1 % (ref 42–54)
HGB BLD-MCNC: 9.7 G/DL (ref 13.5–17.5)
IMM GRANULOCYTES NFR BLD: 0.5 % (ref 0–5)
LYMPHOCYTES NFR BLD AUTO: 16.9 % (ref 15–50)
MAGNESIUM SERPL-MCNC: 2.6 MG/DL (ref 1.8–2.4)
MCH RBC QN AUTO: 29.2 PG (ref 26–34)
MCHC RBC AUTO-ENTMCNC: 33.3 G/DL (ref 31–37)
MCV RBC: 87.7 FL (ref 80–100)
MONOCYTES NFR BLD: 8.4 % (ref 2–11)
NEUTROPHILS NFR BLD AUTO: 72.6 % (ref 40–80)
OSMOLALITY SERPL CALC.SUM OF ELEC: 301 MOSM/KG (ref 275–300)
PHOSPHATE SERPL-MCNC: 3.7 MG/DL (ref 2.5–4.9)
PLATELET # BLD: 295 10X3/UL (ref 130–400)
PMV BLD AUTO: 8.7 FL (ref 7.4–10.4)
POTASSIUM SERPL-SCNC: 3.4 MMOL/L (ref 3.5–5.1)
PROT SERPL-MCNC: 6 G/DL (ref 6.4–8.2)
RBC # BLD AUTO: 3.32 10X6/UL (ref 4.2–6.1)
SODIUM SERPL-SCNC: 142 MMOL/L (ref 136–145)
WBC # BLD AUTO: 9.4 10X3/UL (ref 4.8–10.8)

## 2019-06-14 NOTE — NUR
PATIENT LAYING IN BED ON BACK AWAKE ALERT. PATIENT ORIENTED TO NAME,  AND
OPLACE ONLY. PATIENT IS STABLE AND VSS. PATIENT DENEIS ANY NEEDS OR PAIN.
ASSESSMENT COMPLETED. WILL CONTINUE WITH PLAN OF CARE. SR UP X2 BED IN LOW
POSITION AND CALL LIGHT IN REACH.

## 2019-06-14 NOTE — NUR
Nutrition follow-up:
RDN visited with pt during breakfast.  Pt ate ~50% of meals.  Pt stated he
needed to be fed due to hands shaking.  RDN pasted this on to nurse.
Diet: Renal
PO Intake ~25-50% of some meals
Labs reviewed
Pt is now assessed with severe malnutrition of chronic illness R/T CKD AEB
1) < 75% energy intake for > 1 month
2) measurably reduced  strength
 
Will continue to provide food choices and honor food preferences
Will offer nutritional supplements
Pt may benefit from addition of an appetite stimulant.
RDN following.

## 2019-06-14 NOTE — NUR
IV TO RT FA INFILTRATED. NEW IV RE-SITED TO LT FA 22 GAUGE WITHOUT
DIFFICULTY.PATIENT TOLERATED WELL. WILL CONTINUE TO MONITOR. SR UP X 2 BED IN
LOW POSITION AND CALL LIGHT IN REACH.

## 2019-06-14 NOTE — NUR
PATIENT RESTING QUIETLY IN BED. REPOSITIONED PATIENT TO RT SIDE. VSS AND
PATIENT IS STABLE AND UNCHANGED. WILL CONTINUE TO MONITOR. SR UP X 2 BED IN
LOW POSITION AND CALL LIGHT IN REACH.

## 2019-06-14 NOTE — NUR
PATIENT RESTING COMFORTABLY IN BED ON RT SIDE. PATIENT IS STABLE AND VSS.
POSEY BED ALARM IN PLACE AND WORKING. WILL CONTINUE TO MONITOR. SR UP X 2 BED
IN LOW POSITION AND CALL LIGHT IN  PLACE.

## 2019-06-15 VITALS — DIASTOLIC BLOOD PRESSURE: 71 MMHG | SYSTOLIC BLOOD PRESSURE: 132 MMHG

## 2019-06-15 VITALS — DIASTOLIC BLOOD PRESSURE: 48 MMHG | SYSTOLIC BLOOD PRESSURE: 96 MMHG

## 2019-06-15 VITALS — SYSTOLIC BLOOD PRESSURE: 129 MMHG | DIASTOLIC BLOOD PRESSURE: 66 MMHG

## 2019-06-15 LAB
ALBUMIN SERPL-MCNC: 2.6 G/DL (ref 3.4–5)
ALP SERPL-CCNC: 65 U/L (ref 46–116)
ALT SERPL-CCNC: 15 U/L (ref 10–68)
ANION GAP SERPL CALC-SCNC: 12 MMOL/L (ref 8–16)
BASOPHILS NFR BLD AUTO: 0.3 % (ref 0–2)
BILIRUB SERPL-MCNC: 0.31 MG/DL (ref 0.2–1.3)
BUN SERPL-MCNC: 55 MG/DL (ref 7–18)
CALCIUM SERPL-MCNC: 9.1 MG/DL (ref 8.5–10.1)
CHLORIDE SERPL-SCNC: 107 MMOL/L (ref 98–107)
CO2 SERPL-SCNC: 28.2 MMOL/L (ref 21–32)
CREAT SERPL-MCNC: 2.8 MG/DL (ref 0.6–1.3)
EOSINOPHIL NFR BLD: 1.8 % (ref 0–7)
ERYTHROCYTE [DISTWIDTH] IN BLOOD BY AUTOMATED COUNT: 15.7 % (ref 11.5–14.5)
GLOBULIN SER-MCNC: 3.6 G/L
GLUCOSE SERPL-MCNC: 89 MG/DL (ref 74–106)
HCT VFR BLD CALC: 28.5 % (ref 42–54)
HGB BLD-MCNC: 9.5 G/DL (ref 13.5–17.5)
IMM GRANULOCYTES NFR BLD: 0.6 % (ref 0–5)
LYMPHOCYTES NFR BLD AUTO: 15.8 % (ref 15–50)
MCH RBC QN AUTO: 29.4 PG (ref 26–34)
MCHC RBC AUTO-ENTMCNC: 33.3 G/DL (ref 31–37)
MCV RBC: 88.2 FL (ref 80–100)
MONOCYTES NFR BLD: 7.1 % (ref 2–11)
NEUTROPHILS NFR BLD AUTO: 74.4 % (ref 40–80)
OSMOLALITY SERPL CALC.SUM OF ELEC: 300 MOSM/KG (ref 275–300)
PLATELET # BLD: 300 10X3/UL (ref 130–400)
PMV BLD AUTO: 9 FL (ref 7.4–10.4)
POTASSIUM SERPL-SCNC: 3.2 MMOL/L (ref 3.5–5.1)
PROT SERPL-MCNC: 6.2 G/DL (ref 6.4–8.2)
RBC # BLD AUTO: 3.23 10X6/UL (ref 4.2–6.1)
SODIUM SERPL-SCNC: 144 MMOL/L (ref 136–145)
WBC # BLD AUTO: 10.7 10X3/UL (ref 4.8–10.8)

## 2019-06-15 NOTE — MORECARE
CASE MANAGEMENT DISCHARGE SUMMARY
 
 
PATIENT: SHERI BLAKE                     UNIT: [id number]
ACCOUNT#: [id number]                       ADM DATE: 19
AGE: 64     : 53  SEX: M            ROOM/BED: D.2103    
AUTHOR: ELKIN,DOC                             PHYSICIAN:                               
 
REFERRING PHYSICIAN: ANAY LEON MD               
DATE OF SERVICE: 06/15/19
Discharge Plan
 
 
Patient Name: SHERI BLAKE
Facility: [facility]:[place]
Encounter #: [id number]
Medical Record #: [id number]
: 1955
Planned Disposition: Long Term Care [facility]
Anticipated Discharge Date: 
 
Discharge Date: 
Expected LOS: 
Initial Reviewer: [id number]
Initial Review Date: 2019
Generated: 6/15/19  11:48 am 
Comments
 
DCP- Discharge Planning
 
Updated by [id number]: Dayan Kaur on 6/15/19   9:46 am CT
Patient Name: SHERI BLAKE                                     
Admission Status: ER   
Accout number: [id number]                              
Admission Date: 2019   
: 1955                                                        
Admission Diagnosis:UNSPECIFIED ABDOMINAL PAIN   
Attending: ANAY LEON                                                
Current LOS:  6   
  
Anticipated DC Date:    
Planned Disposition: Long Term Care [facility]   
Primary Insurance: MEDICARE A & B   
  
  
Discharge Planning Comments: CM consulted to see if [facility] would take pt 
back today. I called and Alem stated they would but DON would prefer not to 
until Monday because she (Alem) did not know how to do an admission. CM sees 
no reason to hold pt .   
  
 
  
  
  
  
  
: Dayan Kaur
DCP- Discharge Planning
 
Updated by [id number]: Princess Gomes on 19   5:35 pm CT
Late Entry 1000  
CM to patient's bedside to speak with the patient regarding discharge 
planning. He was very sedated. CM spoke with the primary nurse. Patient had 
been sedated with Ativan for tremors and restlessness.  
Revisited this afternoon. He is arousable but CM unable to discuss dcp.  
TC to [facility]. Spoke w/ Virgen. She stated the patient is 
in long term care.  
They are expecting him to return at discharge.   
Clinical update faxed as per request. Clinical faxed to [phone].  
CM to follow to assist with discharge planning.  
[facility] contact phone number- [phone].
  
 
 
 
 
 
 
 
 
Last DP export: 19   5:40 p
Patient Name: SHERI BLAKE
Encounter #: [id number]
Page [phone]
 
 
 
 
 
Electronically Signed by MERVAT GRANGER on 06/15/19 at 1049
 
 
 
 
 
 
**All edits/amendments must be made on the electronic document**
 
DICTATION DATE: 06/15/19 1048     : CHANDRAKANT  06/15/19 1048     
RPT#: [id number]                                DC DATE:        
                                               STATUS: ADM IN  
[facility]
[address] [place] [address]
***END OF REPORT***

## 2019-06-15 NOTE — NUR
RESUMING PATIENT CARE. PATIENT IS RESTING COMFORTABLY IN BED. RESPIRATIONS ARE
EVEN AND UNLABORED. NO S/S OF DISTRESS. NO C/O PAIN. CALL LIGHT WITHIN REACH.
JERMAINE POWEROC.

## 2019-06-15 NOTE — NUR
REPORT RECEIVED FROM NIGHT SHIFT AND PATIENT CARE ASSUMED. PATIENT LAYING IN
BED ON RT SIDE WITH EYES CLOSED AND BREATHING EVENLY. PATIENT IS STABLE AND
VSS. WILL CONTINUE WITH PLAN OF CARE. SR UP X 2 BED IN LOW POSITION AND CALL
LIGHT IN REACH.

## 2019-06-15 NOTE — NUR
PATIENT RESTING COMFORTABLY IN BED. DENIES ANY NEEDS OR PAIN. ASSESSMENT
COMPLETED. WILL CONTINUE TO MONITOR. SR UP X 2 BED IN LOW POSITION AND CALL
LIGHT IN REACH. POSEY BED ALARM IN PLACE AND WORKING.

## 2019-06-15 NOTE — NUR
ORDERS RECEIVED FOR DC. WRITTEN AND VERBAL INSTRUCTIONS GIVEN TO PATIENT.
PATIENT VERBALIZED UNDERSTANDING. PATIENT UNABLE TO SIGN PAPERWORK DUE TO
TREMORS. CALLED REPORT TO [facility] AND GAVE REPORT TO TRUNG TORO LPN.
PATIENT IS STABLE AND VSS. PATIENT [facility] FRONT DO[facility]EL.

## 2019-06-15 NOTE — MORECARE
CASE MANAGEMENT DISCHARGE SUMMARY
 
 
PATIENT: SHERI BLAKE                     UNIT: [id number]
ACCOUNT#: [id number]                       ADM DATE: 19
AGE: 64     : 53  SEX: M            ROOM/BED: D.2103    
AUTHOR: ELKINDOC                             PHYSICIAN:                               
 
REFERRING PHYSICIAN: ANAY LEON MD               
DATE OF SERVICE: 06/15/19
Discharge Plan
 
 
Patient Name: SHERI BLAKE
Facility: [facility]:[place]
Encounter #: [id number]
Medical Record #: [id number]
: 1955
Planned Disposition: Long Term Care [facility]
Anticipated Discharge Date: 
 
Discharge Date: 06/15/2019
Expected LOS: 
Initial Reviewer: [id number]
Initial Review Date: 2019
Generated: 6/15/19   2:56 pm 
Comments
 
DCP- Discharge Planning
 
Updated by [id number]: Dayan Kaur on 6/15/19  12:53 pm CT
Patient Name: SHERI BLAKE                                     
Admission Status: ER   
Accout number: [id number]                              
Admission Date: 2019   
: 1955                                                        
Admission Diagnosis:UNSPECIFIED ABDOMINAL PAIN   
Attending: ANAY LEON                                                
Current LOS:  6   
  
Anticipated DC Date:    
Planned Disposition: Long Term Care [facility]   
Primary Insurance: MEDICARE A & B   
  
  
Discharge Planning Comments:   
 CM CALLED [facility] TO LT BED TO NOTIFY OF DC. THEY STATED THEY ELSA PICK 
UP AROUND 1 PM TODAY    
  
  
 
  
  
  
: Dayan Kaur
DCP- Discharge Planning
 
Updated by [id number]: Dayan Kaur on 6/15/19   9:46 am CT
Patient Name: SHERI BLAKE                                     
Admission Status: ER   
Accout number: [id number]                              
Admission Date: 2019   
: 1955                                                        
Admission Diagnosis:UNSPECIFIED ABDOMINAL PAIN   
Attending: ANAY LEON                                                
Current LOS:  6   
  
Anticipated DC Date:    
Planned Disposition: Long Term Care [facility]   
Primary Insurance: MEDICARE A & B   
  
  
Discharge Planning Comments: CM consulted to see if [facility] would take pt 
back today. I called and Alem stated they would but DON would prefer not to 
until Monday because she (Alem) did not know how to do an admission. CM sees 
no reason to hold pt .   
  
  
  
  
  
  
: Dayan Kaur
DCP- Discharge Planning
 
Updated by [id number]: Princess Gomes on 19   5:35 pm CT
Late Entry 1000  
CM to patient's bedside to speak with the patient regarding discharge 
planning. He was very sedated. CM spoke with the primary nurse. Patient had 
been sedated with Ativan for tremors and restlessness.  
Revisited this afternoon. He is arousable but CM unable to discuss dcp.  
TC to [facility]. Spoke w/ Virgen. She stated the patient is 
in long term care.  
They are expecting him to return at discharge.   
Clinical update faxed as per request. Clinical faxed to [phone].  
CM to follow to assist with discharge planning.  
[facility] contact phone number- [phone].
  
 
 
 
 
 
 
 
 
 
Last DP export: 6/15/19   9:49 a
Patient Name: SHERI BLAKE
Encounter #: [id number]
Page [phone]
 
 
 
 
 
Electronically Signed by MERVAT GRANGER on 06/15/19 at 1356
 
 
 
 
 
 
**All edits/amendments must be made on the electronic document**
 
DICTATION DATE: 06/15/19 1354     : CHANDRAKANT  06/15/19 1350     
RPT#: [id number]                                DC DATE:06/15/19
                                               STATUS: DIS IN  
[facility]
[address] [place] [address]
***END OF REPORT***

## 2019-06-17 NOTE — MORECARE
CASE MANAGEMENT DISCHARGE SUMMARY
 
 
PATIENT: SHERI BLAKE                     UNIT: [id number]
ACCOUNT#: [id number]                       ADM DATE: 19
AGE: 64     : 53  SEX: M            ROOM/BED: D.2103    
AUTHOR: MERVAT GRANGER                             PHYSICIAN:                               
 
REFERRING PHYSICIAN: ANAY LEON MD               
DATE OF SERVICE: 19
Discharge Plan
 
 
Patient Name: SHERI BLAKE
Facility: [facility]:[place]
Encounter #: [id number]
Medical Record #: [id number]
: 1955
Planned Disposition: Nursing Facility [facility]
Anticipated Discharge Date: 6/15/19
 
Discharge Date: 06/15/2019
Expected LOS: 6
Initial Reviewer: [id number]
Initial Review Date: 2019
Generated: 19   9:03 am 
Comments
 
DCP- Discharge Planning
 
Updated by [id number]: Dayan Kaur on 6/15/19  12:53 pm CT
Patient Name: SHERI BLAKE                                     
Admission Status: ER   
Accout number: [id number]                              
Admission Date: 2019   
: 1955                                                        
Admission Diagnosis:UNSPECIFIED ABDOMINAL PAIN   
Attending: ANAY LEON                                                
Current LOS:  6   
  
Anticipated DC Date:    
Planned Disposition: Long Term Care [facility]   
Primary Insurance: MEDICARE A & B   
  
  
Discharge Planning Comments:   
 CM CALLED [facility] TO LTC BED TO NOTIFY OF DC. THEY STATED THEY ELSA PICK 
UP AROUND 1 PM TODAY    
  
  
 
  
  
  
: Dayan Kaur
DCP- Discharge Planning
 
Updated by [id number]: Dayan Kaur on 6/15/19   9:46 am CT
Patient Name: SHERI BLAKE                                     
Admission Status: ER   
Accout number: [id number]                              
Admission Date: 2019   
: 1955                                                        
Admission Diagnosis:UNSPECIFIED ABDOMINAL PAIN   
Attending: ANAY LEON                                                
Current LOS:  6   
  
Anticipated DC Date:    
Planned Disposition: Long Term Care [facility]   
Primary Insurance: MEDICARE A & B   
  
  
Discharge Planning Comments: CM consulted to see if [facility] would take pt 
back today. I called and Alem stated they would but DON would prefer not to 
until Monday because she (Alem) did not know how to do an admission. CM sees 
no reason to hold pt .   
  
  
  
  
  
  
: Dayan Kaur
DCP- Discharge Planning
 
Updated by [id number]: Princess Gomes on 19   5:35 pm CT
Late Entry 1000  
CM to patient's bedside to speak with the patient regarding discharge 
planning. He was very sedated. CM spoke with the primary nurse. Patient had 
been sedated with Ativan for tremors and restlessness.  
Revisited this afternoon. He is arousable but CM unable to discuss dcp.  
TC to [facility]. Spoke w/ Virgen. She stated the patient is 
in long term care.  
They are expecting him to return at discharge.   
Clinical update faxed as per request. Clinical faxed to [phone].  
CM to follow to assist with discharge planning.  
[facility] contact phone number- [phone].
  
 
 
 
 
 
 
 
 
 
Last DP export: 6/15/19  12:56 p
Patient Name: SHERI BLAKE
Encounter #: [id number]
Page [id number]
 
 
 
 
 
Electronically Signed by MERVAT GRANGER on 19 at 0804
 
 
 
 
 
 
**All edits/amendments must be made on the electronic document**
 
DICTATION DATE: 19     : CHANDRAKANT  19     
RPT#: [id number]                                DC DATE:06/15/19
                                               STATUS: DIS IN  
[facility]
[address] [place] [address]
***END OF REPORT***

## 2020-04-01 NOTE — NUR
-Blood pressures well controlled.  CPM.   EVENING ROUNDS COMPLETED. REPORT RECEIVED. PT SITTING UP IN BED WITH EYES
OPEN, RR EVEN AND UNLABORED. POSEY ALARM IN PLACE. INTRODUCED SELF TO PT. PT
DENIES FURTHER NEEDS AT THIS TIME. ORDERED FLUIDS (BANANA BAG) COULD NOT BE
ADMINISTERED, PHARMACY NOT AVAILABLE. NOTIFIED HOUSE SUPERVISOR MARIA ISABEL. CALL
LIGHT IN REACH. WILL CTM.

## 2020-06-23 ENCOUNTER — HOSPITAL ENCOUNTER (INPATIENT)
Dept: HOSPITAL 84 - D.ER | Age: 65
LOS: 9 days | Discharge: NURSING, REHAB OR LONG-TERM CARE | DRG: 280 | End: 2020-07-02
Attending: FAMILY MEDICINE | Admitting: FAMILY MEDICINE
Payer: MEDICARE

## 2020-06-23 VITALS — SYSTOLIC BLOOD PRESSURE: 113 MMHG | DIASTOLIC BLOOD PRESSURE: 76 MMHG

## 2020-06-23 VITALS — SYSTOLIC BLOOD PRESSURE: 181 MMHG | DIASTOLIC BLOOD PRESSURE: 100 MMHG

## 2020-06-23 VITALS — SYSTOLIC BLOOD PRESSURE: 127 MMHG | DIASTOLIC BLOOD PRESSURE: 91 MMHG

## 2020-06-23 VITALS — SYSTOLIC BLOOD PRESSURE: 117 MMHG | DIASTOLIC BLOOD PRESSURE: 68 MMHG

## 2020-06-23 VITALS — DIASTOLIC BLOOD PRESSURE: 75 MMHG | SYSTOLIC BLOOD PRESSURE: 117 MMHG

## 2020-06-23 VITALS
HEIGHT: 71 IN | WEIGHT: 223 LBS | BODY MASS INDEX: 31.22 KG/M2 | BODY MASS INDEX: 31.22 KG/M2 | WEIGHT: 223 LBS | BODY MASS INDEX: 31.22 KG/M2 | HEIGHT: 71 IN | BODY MASS INDEX: 31.22 KG/M2

## 2020-06-23 VITALS — SYSTOLIC BLOOD PRESSURE: 120 MMHG | DIASTOLIC BLOOD PRESSURE: 66 MMHG

## 2020-06-23 VITALS — DIASTOLIC BLOOD PRESSURE: 75 MMHG | SYSTOLIC BLOOD PRESSURE: 125 MMHG

## 2020-06-23 VITALS — SYSTOLIC BLOOD PRESSURE: 132 MMHG | DIASTOLIC BLOOD PRESSURE: 83 MMHG

## 2020-06-23 DIAGNOSIS — N17.9: ICD-10-CM

## 2020-06-23 DIAGNOSIS — E83.51: ICD-10-CM

## 2020-06-23 DIAGNOSIS — J44.9: ICD-10-CM

## 2020-06-23 DIAGNOSIS — I21.A1: ICD-10-CM

## 2020-06-23 DIAGNOSIS — I13.0: ICD-10-CM

## 2020-06-23 DIAGNOSIS — I16.0: Primary | ICD-10-CM

## 2020-06-23 DIAGNOSIS — I50.31: ICD-10-CM

## 2020-06-23 DIAGNOSIS — M25.572: ICD-10-CM

## 2020-06-23 DIAGNOSIS — I20.0: ICD-10-CM

## 2020-06-23 DIAGNOSIS — I08.1: ICD-10-CM

## 2020-06-23 DIAGNOSIS — N18.9: ICD-10-CM

## 2020-06-23 DIAGNOSIS — E03.9: ICD-10-CM

## 2020-06-23 DIAGNOSIS — I71.4: ICD-10-CM

## 2020-06-23 DIAGNOSIS — D64.9: ICD-10-CM

## 2020-06-23 DIAGNOSIS — E87.1: ICD-10-CM

## 2020-06-23 DIAGNOSIS — D63.1: ICD-10-CM

## 2020-06-23 DIAGNOSIS — I44.2: ICD-10-CM

## 2020-06-23 DIAGNOSIS — E78.5: ICD-10-CM

## 2020-06-23 LAB
ALBUMIN SERPL-MCNC: 2.8 G/DL (ref 3.4–5)
ALP SERPL-CCNC: 131 U/L (ref 30–120)
ALT SERPL-CCNC: 16 U/L (ref 10–68)
ANION GAP SERPL CALC-SCNC: 13.3 MMOL/L (ref 8–16)
APTT BLD: 36.5 SECONDS (ref 22.8–39.4)
BASOPHILS NFR BLD AUTO: 0.4 % (ref 0–2)
BILIRUB SERPL-MCNC: 1.5 MG/DL (ref 0.2–1.3)
BUN SERPL-MCNC: 28 MG/DL (ref 7–18)
CALCIUM SERPL-MCNC: < 5 MG/DL (ref 8.5–10.1)
CHLORIDE SERPL-SCNC: 97 MMOL/L (ref 98–107)
CK MB SERPL-MCNC: 2.1 U/L (ref 0–3.6)
CK MB SERPL-MCNC: 2.2 U/L (ref 0–3.6)
CK SERPL-CCNC: 903 UL (ref 21–232)
CK SERPL-CCNC: 972 UL (ref 21–232)
CO2 SERPL-SCNC: 31.9 MMOL/L (ref 21–32)
CREAT SERPL-MCNC: 2.4 MG/DL (ref 0.6–1.3)
CRP SERPL-MCNC: 10.9 MG/DL (ref 0–0.9)
EOSINOPHIL NFR BLD: 0.2 % (ref 0–7)
ERYTHROCYTE [DISTWIDTH] IN BLOOD BY AUTOMATED COUNT: 18.6 % (ref 11.5–14.5)
FERRITIN SERPL-MCNC: 107 NG/ML (ref 3–244)
GLOBULIN SER-MCNC: 4.7 G/L
GLUCOSE SERPL-MCNC: 150 MG/DL (ref 74–106)
HCT VFR BLD CALC: 32.7 % (ref 42–54)
HGB BLD-MCNC: 10 G/DL (ref 13.5–17.5)
IMM GRANULOCYTES NFR BLD: 0.1 % (ref 0–5)
INR PPP: 1.47 (ref 0.85–1.17)
LYMPHOCYTES NFR BLD AUTO: 7.6 % (ref 15–50)
MAGNESIUM SERPL-MCNC: 1.5 MG/DL (ref 1.8–2.4)
MCH RBC QN AUTO: 26 PG (ref 26–34)
MCHC RBC AUTO-ENTMCNC: 30.6 G/DL (ref 31–37)
MCV RBC: 84.9 FL (ref 80–100)
MONOCYTES NFR BLD: 11.2 % (ref 2–11)
NEUTROPHILS NFR BLD AUTO: 80.5 % (ref 40–80)
OSMOLALITY SERPL CALC.SUM OF ELEC: 284 MOSM/KG (ref 275–300)
PLATELET # BLD: 169 10X3/UL (ref 130–400)
PMV BLD AUTO: 9.8 FL (ref 7.4–10.4)
POTASSIUM SERPL-SCNC: 4.2 MMOL/L (ref 3.5–5.1)
PROT SERPL-MCNC: 7.5 G/DL (ref 6.4–8.2)
PROTHROMBIN TIME: 17.7 SECONDS (ref 11.6–15)
RBC # BLD AUTO: 3.85 10X6/UL (ref 4.2–6.1)
SODIUM SERPL-SCNC: 138 MMOL/L (ref 136–145)
TROPONIN I SERPL-MCNC: 0.08 NG/ML (ref 0–0.06)
TROPONIN I SERPL-MCNC: 0.08 NG/ML (ref 0–0.06)
WBC # BLD AUTO: 8.2 10X3/UL (ref 4.8–10.8)

## 2020-06-23 NOTE — NUR
PT STATES HE RECEIVED NTG SPRAY X 1 PER EMS. REPORT FEELING BETTER AFTER 
2ND NTG TAB. STATES PAIN IS ALMOST GONE AND HE IS GETTING SLEEPY. RATES PAIN
1-2/10.

## 2020-06-23 NOTE — NUR
PT INCONTINENT OF URINE AND BOWEL. AT THIS TIME, KEYONNA CARE AND LINEN CHANGE
PROVIDED. PT REPOSITIONED IN BED. 
 
DR. CAZARES AT BEDSIDE.

## 2020-06-24 VITALS — SYSTOLIC BLOOD PRESSURE: 113 MMHG | DIASTOLIC BLOOD PRESSURE: 68 MMHG

## 2020-06-24 VITALS — DIASTOLIC BLOOD PRESSURE: 76 MMHG | SYSTOLIC BLOOD PRESSURE: 99 MMHG

## 2020-06-24 VITALS — DIASTOLIC BLOOD PRESSURE: 79 MMHG | SYSTOLIC BLOOD PRESSURE: 155 MMHG

## 2020-06-24 VITALS — SYSTOLIC BLOOD PRESSURE: 119 MMHG | DIASTOLIC BLOOD PRESSURE: 68 MMHG

## 2020-06-24 VITALS — DIASTOLIC BLOOD PRESSURE: 65 MMHG | SYSTOLIC BLOOD PRESSURE: 136 MMHG

## 2020-06-24 LAB
ALBUMIN SERPL-MCNC: 3.1 G/DL (ref 3.4–5)
ALP SERPL-CCNC: 133 U/L (ref 30–120)
ALT SERPL-CCNC: 19 U/L (ref 10–68)
AMYLASE SERPL-CCNC: 41 U/L (ref 25–115)
ANION GAP SERPL CALC-SCNC: 22.5 MMOL/L (ref 8–16)
BASOPHILS NFR BLD AUTO: 0.5 % (ref 0–2)
BILIRUB SERPL-MCNC: 1.78 MG/DL (ref 0.2–1.3)
BILIRUB SERPL-MCNC: NEGATIVE MG/DL
BUN SERPL-MCNC: 33 MG/DL (ref 7–18)
CALCIUM SERPL-MCNC: < 5 MG/DL (ref 8.5–10.1)
CHLORIDE SERPL-SCNC: 96 MMOL/L (ref 98–107)
CK MB SERPL-MCNC: 2.3 U/L (ref 0–3.6)
CK MB SERPL-MCNC: 2.4 U/L (ref 0–3.6)
CK SERPL-CCNC: 1073 UL (ref 21–232)
CK SERPL-CCNC: 1103 UL (ref 21–232)
CO2 SERPL-SCNC: 23.2 MMOL/L (ref 21–32)
CREAT SERPL-MCNC: 2.5 MG/DL (ref 0.6–1.3)
EOSINOPHIL NFR BLD: 1.1 % (ref 0–7)
ERYTHROCYTE [DISTWIDTH] IN BLOOD BY AUTOMATED COUNT: 18.5 % (ref 11.5–14.5)
FERRITIN SERPL-MCNC: 113 NG/ML (ref 3–244)
GLOBULIN SER-MCNC: 4.2 G/L
GLUCOSE SERPL-MCNC: 83 MG/DL (ref 74–106)
GLUCOSE SERPL-MCNC: NEGATIVE MG/DL
HCT VFR BLD CALC: 33.7 % (ref 42–54)
HGB BLD-MCNC: 10.2 G/DL (ref 13.5–17.5)
IMM GRANULOCYTES NFR BLD: 0.2 % (ref 0–5)
IRON SERPL-MCNC: 34 UG/DL (ref 35–150)
KETONES UR STRIP-MCNC: NEGATIVE MG/DL
LIPASE SERPL-CCNC: 59 U/L (ref 73–393)
LYMPHOCYTES NFR BLD AUTO: 15.2 % (ref 15–50)
MCH RBC QN AUTO: 26.1 PG (ref 26–34)
MCHC RBC AUTO-ENTMCNC: 30.3 G/DL (ref 31–37)
MCV RBC: 86.2 FL (ref 80–100)
MONOCYTES NFR BLD: 9 % (ref 2–11)
NEUTROPHILS NFR BLD AUTO: 74 % (ref 40–80)
NITRITE UR-MCNC: NEGATIVE MG/ML
OSMOLALITY SERPL CALC.SUM OF ELEC: 279 MOSM/KG (ref 275–300)
PH UR STRIP: 5 [PH] (ref 5–6)
PLATELET # BLD: 181 10X3/UL (ref 130–400)
PMV BLD AUTO: 10.4 FL (ref 7.4–10.4)
POTASSIUM SERPL-SCNC: 4.7 MMOL/L (ref 3.5–5.1)
PROT SERPL-MCNC: 7.3 G/DL (ref 6.4–8.2)
RBC # BLD AUTO: 3.91 10X6/UL (ref 4.2–6.1)
SAO2 % BLD FROM PO2: 11 % (ref 15–55)
SODIUM SERPL-SCNC: 137 MMOL/L (ref 136–145)
SP GR UR STRIP: 1.01 (ref 1–1.02)
TIBC SERPL-MCNC: 290 UG/DL (ref 260–445)
TROPONIN I SERPL-MCNC: 0.06 NG/ML (ref 0–0.06)
TROPONIN I SERPL-MCNC: 0.07 NG/ML (ref 0–0.06)
UIBC SERPL-MCNC: 256 UG/DL (ref 150–375)
UROBILINOGEN UR-MCNC: NORMAL MG/DL
WBC # BLD AUTO: 8.4 10X3/UL (ref 4.8–10.8)

## 2020-06-24 NOTE — NUR
URINE CUP AND NEW URINAL PROVIDED TO PT. ADVISED PT TO CALL FOR ASSIST FOR
URINE SAMPLE TO BE COLLECTED.

## 2020-06-24 NOTE — NUR
PT SITTING IN CHAIR AT BEDSIDE REPORTS HE WAS
INCONTINET OF URINE APPROX 30 MINUTES AGO.
BLADDER SCAN COMPLETED PER ORDERS. 150 ML NOTED.

## 2020-06-25 VITALS — DIASTOLIC BLOOD PRESSURE: 66 MMHG | SYSTOLIC BLOOD PRESSURE: 119 MMHG

## 2020-06-25 VITALS — SYSTOLIC BLOOD PRESSURE: 120 MMHG | DIASTOLIC BLOOD PRESSURE: 71 MMHG

## 2020-06-25 VITALS — SYSTOLIC BLOOD PRESSURE: 125 MMHG | DIASTOLIC BLOOD PRESSURE: 74 MMHG

## 2020-06-25 VITALS — SYSTOLIC BLOOD PRESSURE: 121 MMHG | DIASTOLIC BLOOD PRESSURE: 76 MMHG

## 2020-06-25 VITALS — SYSTOLIC BLOOD PRESSURE: 126 MMHG | DIASTOLIC BLOOD PRESSURE: 68 MMHG

## 2020-06-25 VITALS — SYSTOLIC BLOOD PRESSURE: 119 MMHG | DIASTOLIC BLOOD PRESSURE: 70 MMHG

## 2020-06-25 LAB
ALBUMIN SERPL-MCNC: 3 G/DL (ref 3.4–5)
ALP SERPL-CCNC: 129 U/L (ref 30–120)
ALT SERPL-CCNC: 40 U/L (ref 10–68)
ANION GAP SERPL CALC-SCNC: 16.8 MMOL/L (ref 8–16)
BASOPHILS NFR BLD AUTO: 0.5 % (ref 0–2)
BILIRUB SERPL-MCNC: 1.93 MG/DL (ref 0.2–1.3)
BUN SERPL-MCNC: 45 MG/DL (ref 7–18)
CALCIUM SERPL-MCNC: < 5 MG/DL (ref 8.5–10.1)
CHLORIDE SERPL-SCNC: 98 MMOL/L (ref 98–107)
CO2 SERPL-SCNC: 29.4 MMOL/L (ref 21–32)
CREAT SERPL-MCNC: 3.4 MG/DL (ref 0.6–1.3)
EOSINOPHIL NFR BLD: 0.1 % (ref 0–7)
ERYTHROCYTE [DISTWIDTH] IN BLOOD BY AUTOMATED COUNT: 18.6 % (ref 11.5–14.5)
GLOBULIN SER-MCNC: 4.4 G/L
GLUCOSE SERPL-MCNC: 108 MG/DL (ref 74–106)
HCT VFR BLD CALC: 34.2 % (ref 42–54)
HGB BLD-MCNC: 10.2 G/DL (ref 13.5–17.5)
IMM GRANULOCYTES NFR BLD: 0.2 % (ref 0–5)
LYMPHOCYTES NFR BLD AUTO: 8.9 % (ref 15–50)
MCH RBC QN AUTO: 25.6 PG (ref 26–34)
MCHC RBC AUTO-ENTMCNC: 29.8 G/DL (ref 31–37)
MCV RBC: 85.9 FL (ref 80–100)
MONOCYTES NFR BLD: 8.5 % (ref 2–11)
NEUTROPHILS NFR BLD AUTO: 81.8 % (ref 40–80)
OSMOLALITY SERPL CALC.SUM OF ELEC: 290 MOSM/KG (ref 275–300)
PLATELET # BLD: 213 10X3/UL (ref 130–400)
PMV BLD AUTO: 10.2 FL (ref 7.4–10.4)
POTASSIUM SERPL-SCNC: 5.2 MMOL/L (ref 3.5–5.1)
PROT SERPL-MCNC: 7.4 G/DL (ref 6.4–8.2)
RBC # BLD AUTO: 3.98 10X6/UL (ref 4.2–6.1)
SODIUM SERPL-SCNC: 139 MMOL/L (ref 136–145)
T4 FREE SERPL-MCNC: 1.01 NG/DL (ref 0.76–1.46)
TSH SERPL-ACNC: 5.14 UIU/ML (ref 0.36–3.74)
WBC # BLD AUTO: 9.2 10X3/UL (ref 4.8–10.8)

## 2020-06-26 VITALS — DIASTOLIC BLOOD PRESSURE: 72 MMHG | SYSTOLIC BLOOD PRESSURE: 124 MMHG

## 2020-06-26 VITALS — SYSTOLIC BLOOD PRESSURE: 114 MMHG | DIASTOLIC BLOOD PRESSURE: 66 MMHG

## 2020-06-26 VITALS — SYSTOLIC BLOOD PRESSURE: 130 MMHG | DIASTOLIC BLOOD PRESSURE: 67 MMHG

## 2020-06-26 VITALS — SYSTOLIC BLOOD PRESSURE: 131 MMHG | DIASTOLIC BLOOD PRESSURE: 68 MMHG

## 2020-06-26 VITALS — DIASTOLIC BLOOD PRESSURE: 71 MMHG | SYSTOLIC BLOOD PRESSURE: 128 MMHG

## 2020-06-26 LAB
ALBUMIN SERPL-MCNC: 3 G/DL (ref 3.4–5)
ALP SERPL-CCNC: 114 U/L (ref 30–120)
ALT SERPL-CCNC: 40 U/L (ref 10–68)
ANION GAP SERPL CALC-SCNC: 18.5 MMOL/L (ref 8–16)
BASOPHILS NFR BLD AUTO: 0.3 % (ref 0–2)
BILIRUB SERPL-MCNC: 1.8 MG/DL (ref 0.2–1.3)
BUN SERPL-MCNC: 56 MG/DL (ref 7–18)
CALCIUM SERPL-MCNC: < 5 MG/DL (ref 8.5–10.1)
CHLORIDE SERPL-SCNC: 93 MMOL/L (ref 98–107)
CO2 SERPL-SCNC: 27.8 MMOL/L (ref 21–32)
CREAT SERPL-MCNC: 4.4 MG/DL (ref 0.6–1.3)
CRP SERPL-MCNC: 27 MG/DL (ref 0–0.9)
EOSINOPHIL NFR BLD: 0.6 % (ref 0–7)
ERYTHROCYTE [DISTWIDTH] IN BLOOD BY AUTOMATED COUNT: 18.9 % (ref 11.5–14.5)
GLOBULIN SER-MCNC: 4.6 G/L
GLUCOSE SERPL-MCNC: 84 MG/DL (ref 74–106)
HCT VFR BLD CALC: 32.8 % (ref 42–54)
HGB BLD-MCNC: 9.7 G/DL (ref 13.5–17.5)
IMM GRANULOCYTES NFR BLD: 0.2 % (ref 0–5)
LYMPHOCYTES NFR BLD AUTO: 10.3 % (ref 15–50)
MCH RBC QN AUTO: 25.6 PG (ref 26–34)
MCHC RBC AUTO-ENTMCNC: 29.6 G/DL (ref 31–37)
MCV RBC: 86.5 FL (ref 80–100)
MONOCYTES NFR BLD: 9.8 % (ref 2–11)
NEUTROPHILS NFR BLD AUTO: 78.8 % (ref 40–80)
OSMOLALITY SERPL CALC.SUM OF ELEC: 284 MOSM/KG (ref 275–300)
PLATELET # BLD: 200 10X3/UL (ref 130–400)
PMV BLD AUTO: 10 FL (ref 7.4–10.4)
POTASSIUM SERPL-SCNC: 4.3 MMOL/L (ref 3.5–5.1)
PROT SERPL-MCNC: 7.6 G/DL (ref 6.4–8.2)
RBC # BLD AUTO: 3.79 10X6/UL (ref 4.2–6.1)
SODIUM SERPL-SCNC: 135 MMOL/L (ref 136–145)
WBC # BLD AUTO: 9.3 10X3/UL (ref 4.8–10.8)

## 2020-06-26 NOTE — NUR
ORAL MEDICATIONS GIVEN. PT IVF STOPPED SINCE PATIENT SAYING IV TO LFA IS
HURTING. WILL ATTEMPT RESITE.

## 2020-06-26 NOTE — NUR
Nutrition Follow-up:
Pt unavailable at time of visit this AM. Per record, pt noted to have eaten
100% of breakfast this AM. Noted pt with overall poor prognosis.
Diet: Renal, Dental Soft
PO intake: 44% avg x 4 meals
Wt: 218# (6/26); 220.4# (6/24)
Labs noted: Na 135, Ca <5.0, Alb 3.0
Meds noted: Tums, Folate, Calcitriol, Protonix, Colace, Zofran, electrolyte
            protocol
-Encourage PO intake and honor food preferences within diet restrictions.
-Offer Nepro with meals.
-Monitor wt; noted daily wts ordered.
-RD following.

## 2020-06-27 VITALS — DIASTOLIC BLOOD PRESSURE: 60 MMHG | SYSTOLIC BLOOD PRESSURE: 113 MMHG

## 2020-06-27 VITALS — SYSTOLIC BLOOD PRESSURE: 114 MMHG | DIASTOLIC BLOOD PRESSURE: 73 MMHG

## 2020-06-27 VITALS — SYSTOLIC BLOOD PRESSURE: 118 MMHG | DIASTOLIC BLOOD PRESSURE: 67 MMHG

## 2020-06-27 VITALS — SYSTOLIC BLOOD PRESSURE: 153 MMHG | DIASTOLIC BLOOD PRESSURE: 79 MMHG

## 2020-06-27 VITALS — DIASTOLIC BLOOD PRESSURE: 78 MMHG | SYSTOLIC BLOOD PRESSURE: 138 MMHG

## 2020-06-27 VITALS — DIASTOLIC BLOOD PRESSURE: 69 MMHG | SYSTOLIC BLOOD PRESSURE: 131 MMHG

## 2020-06-27 LAB
ALBUMIN SERPL-MCNC: 2.8 G/DL (ref 3.4–5)
ALP SERPL-CCNC: 116 U/L (ref 30–120)
ALT SERPL-CCNC: 40 U/L (ref 10–68)
ANION GAP SERPL CALC-SCNC: 15.8 MMOL/L (ref 8–16)
BASOPHILS NFR BLD AUTO: 0.3 % (ref 0–2)
BILIRUB SERPL-MCNC: 1.75 MG/DL (ref 0.2–1.3)
BUN SERPL-MCNC: 60 MG/DL (ref 7–18)
CALCIUM SERPL-MCNC: 4.9 MG/DL (ref 8.5–10.1)
CHLORIDE SERPL-SCNC: 95 MMOL/L (ref 98–107)
CO2 SERPL-SCNC: 30 MMOL/L (ref 21–32)
CREAT SERPL-MCNC: 4.2 MG/DL (ref 0.6–1.3)
CRP SERPL-MCNC: 34.9 MG/DL (ref 0–0.9)
EOSINOPHIL NFR BLD: 1.1 % (ref 0–7)
ERYTHROCYTE [DISTWIDTH] IN BLOOD BY AUTOMATED COUNT: 18.8 % (ref 11.5–14.5)
ERYTHROCYTE [SEDIMENTATION RATE] IN BLOOD: 26 MM/HR (ref 0–20)
GLOBULIN SER-MCNC: 4.7 G/L
GLUCOSE SERPL-MCNC: 96 MG/DL (ref 74–106)
HCT VFR BLD CALC: 34.5 % (ref 42–54)
HGB BLD-MCNC: 10.4 G/DL (ref 13.5–17.5)
IMM GRANULOCYTES NFR BLD: 0.2 % (ref 0–5)
LYMPHOCYTES NFR BLD AUTO: 10.6 % (ref 15–50)
MCH RBC QN AUTO: 26.1 PG (ref 26–34)
MCHC RBC AUTO-ENTMCNC: 30.1 G/DL (ref 31–37)
MCV RBC: 86.5 FL (ref 80–100)
MONOCYTES NFR BLD: 8.7 % (ref 2–11)
NEUTROPHILS NFR BLD AUTO: 79.1 % (ref 40–80)
OSMOLALITY SERPL CALC.SUM OF ELEC: 290 MOSM/KG (ref 275–300)
PLATELET # BLD: 258 10X3/UL (ref 130–400)
PMV BLD AUTO: 10.8 FL (ref 7.4–10.4)
POTASSIUM SERPL-SCNC: 3.8 MMOL/L (ref 3.5–5.1)
PROT SERPL-MCNC: 7.5 G/DL (ref 6.4–8.2)
RBC # BLD AUTO: 3.99 10X6/UL (ref 4.2–6.1)
SODIUM SERPL-SCNC: 137 MMOL/L (ref 136–145)
VANCOMYCIN SERPL-MCNC: 18.7 UG/ML (ref 10–20)
WBC # BLD AUTO: 9 10X3/UL (ref 4.8–10.8)

## 2020-06-28 VITALS — SYSTOLIC BLOOD PRESSURE: 129 MMHG | DIASTOLIC BLOOD PRESSURE: 67 MMHG

## 2020-06-28 VITALS — DIASTOLIC BLOOD PRESSURE: 75 MMHG | SYSTOLIC BLOOD PRESSURE: 139 MMHG

## 2020-06-28 VITALS — DIASTOLIC BLOOD PRESSURE: 75 MMHG | SYSTOLIC BLOOD PRESSURE: 108 MMHG

## 2020-06-28 VITALS — DIASTOLIC BLOOD PRESSURE: 60 MMHG | SYSTOLIC BLOOD PRESSURE: 125 MMHG

## 2020-06-28 VITALS — SYSTOLIC BLOOD PRESSURE: 133 MMHG | DIASTOLIC BLOOD PRESSURE: 73 MMHG

## 2020-06-28 VITALS — SYSTOLIC BLOOD PRESSURE: 136 MMHG | DIASTOLIC BLOOD PRESSURE: 67 MMHG

## 2020-06-28 LAB
ALBUMIN SERPL-MCNC: 2.6 G/DL (ref 3.4–5)
ALP SERPL-CCNC: 118 U/L (ref 30–120)
ALT SERPL-CCNC: 35 U/L (ref 10–68)
ANION GAP SERPL CALC-SCNC: 14.6 MMOL/L (ref 8–16)
BASOPHILS NFR BLD AUTO: 0.4 % (ref 0–2)
BILIRUB SERPL-MCNC: 1.47 MG/DL (ref 0.2–1.3)
BUN SERPL-MCNC: 58 MG/DL (ref 7–18)
CALCIUM SERPL-MCNC: < 5 MG/DL (ref 8.5–10.1)
CHLORIDE SERPL-SCNC: 95 MMOL/L (ref 98–107)
CO2 SERPL-SCNC: 29.5 MMOL/L (ref 21–32)
CREAT SERPL-MCNC: 3.5 MG/DL (ref 0.6–1.3)
EOSINOPHIL NFR BLD: 1.8 % (ref 0–7)
ERYTHROCYTE [DISTWIDTH] IN BLOOD BY AUTOMATED COUNT: 19 % (ref 11.5–14.5)
GLOBULIN SER-MCNC: 4.6 G/L
GLUCOSE SERPL-MCNC: 98 MG/DL (ref 74–106)
HCT VFR BLD CALC: 33.1 % (ref 42–54)
HGB BLD-MCNC: 10 G/DL (ref 13.5–17.5)
IMM GRANULOCYTES NFR BLD: 0.1 % (ref 0–5)
LYMPHOCYTES NFR BLD AUTO: 9.2 % (ref 15–50)
MCH RBC QN AUTO: 26.2 PG (ref 26–34)
MCHC RBC AUTO-ENTMCNC: 30.2 G/DL (ref 31–37)
MCV RBC: 86.9 FL (ref 80–100)
MONOCYTES NFR BLD: 7.7 % (ref 2–11)
NEUTROPHILS NFR BLD AUTO: 80.8 % (ref 40–80)
OSMOLALITY SERPL CALC.SUM OF ELEC: 287 MOSM/KG (ref 275–300)
PLATELET # BLD: 249 10X3/UL (ref 130–400)
PMV BLD AUTO: 10.1 FL (ref 7.4–10.4)
POTASSIUM SERPL-SCNC: 3.1 MMOL/L (ref 3.5–5.1)
PROT SERPL-MCNC: 7.2 G/DL (ref 6.4–8.2)
RBC # BLD AUTO: 3.81 10X6/UL (ref 4.2–6.1)
SODIUM SERPL-SCNC: 136 MMOL/L (ref 136–145)
VANCOMYCIN SERPL-MCNC: 11.9 UG/ML (ref 10–20)
WBC # BLD AUTO: 6.8 10X3/UL (ref 4.8–10.8)

## 2020-06-28 NOTE — NUR
RECIEVED UP IN BED WITH EYES OPEN AND TV ON. ALERT AND ORIENTED X4. UP WITH
ASSIST. O2@ 2LITERS PER N/C. IV TO LT FA WITH D5LR. TELEMETRY IN PLACE. DENIES
ANY NEES AT THIS TIME.

## 2020-06-29 VITALS — SYSTOLIC BLOOD PRESSURE: 136 MMHG | DIASTOLIC BLOOD PRESSURE: 79 MMHG

## 2020-06-29 VITALS — SYSTOLIC BLOOD PRESSURE: 132 MMHG | DIASTOLIC BLOOD PRESSURE: 72 MMHG

## 2020-06-29 VITALS — SYSTOLIC BLOOD PRESSURE: 126 MMHG | DIASTOLIC BLOOD PRESSURE: 64 MMHG

## 2020-06-29 VITALS — DIASTOLIC BLOOD PRESSURE: 62 MMHG | SYSTOLIC BLOOD PRESSURE: 132 MMHG

## 2020-06-29 VITALS — SYSTOLIC BLOOD PRESSURE: 131 MMHG | DIASTOLIC BLOOD PRESSURE: 61 MMHG

## 2020-06-29 VITALS — DIASTOLIC BLOOD PRESSURE: 66 MMHG | SYSTOLIC BLOOD PRESSURE: 130 MMHG

## 2020-06-29 LAB
ALBUMIN SERPL-MCNC: 2.6 G/DL (ref 3.4–5)
ALP SERPL-CCNC: 144 U/L (ref 30–120)
ALT SERPL-CCNC: 36 U/L (ref 10–68)
ANION GAP SERPL CALC-SCNC: 10.2 MMOL/L (ref 8–16)
BASOPHILS NFR BLD AUTO: 0.3 % (ref 0–2)
BILIRUB SERPL-MCNC: 1.32 MG/DL (ref 0.2–1.3)
BUN SERPL-MCNC: 47 MG/DL (ref 7–18)
CALCIUM SERPL-MCNC: 5.1 MG/DL (ref 8.5–10.1)
CHLORIDE SERPL-SCNC: 98 MMOL/L (ref 98–107)
CO2 SERPL-SCNC: 32.6 MMOL/L (ref 21–32)
CREAT SERPL-MCNC: 2.6 MG/DL (ref 0.6–1.3)
EOSINOPHIL NFR BLD: 0.9 % (ref 0–7)
ERYTHROCYTE [DISTWIDTH] IN BLOOD BY AUTOMATED COUNT: 19.1 % (ref 11.5–14.5)
GLOBULIN SER-MCNC: 4.2 G/L
GLUCOSE SERPL-MCNC: 93 MG/DL (ref 74–106)
HCT VFR BLD CALC: 33 % (ref 42–54)
HGB BLD-MCNC: 9.9 G/DL (ref 13.5–17.5)
IMM GRANULOCYTES NFR BLD: 0.5 % (ref 0–5)
LYMPHOCYTES NFR BLD AUTO: 7.4 % (ref 15–50)
MCH RBC QN AUTO: 26.1 PG (ref 26–34)
MCHC RBC AUTO-ENTMCNC: 30 G/DL (ref 31–37)
MCV RBC: 86.8 FL (ref 80–100)
MONOCYTES NFR BLD: 8 % (ref 2–11)
NEUTROPHILS NFR BLD AUTO: 82.9 % (ref 40–80)
OSMOLALITY SERPL CALC.SUM OF ELEC: 285 MOSM/KG (ref 275–300)
PLATELET # BLD: 253 10X3/UL (ref 130–400)
PMV BLD AUTO: 10 FL (ref 7.4–10.4)
POTASSIUM SERPL-SCNC: 3.8 MMOL/L (ref 3.5–5.1)
PROT SERPL-MCNC: 6.8 G/DL (ref 6.4–8.2)
RBC # BLD AUTO: 3.8 10X6/UL (ref 4.2–6.1)
SODIUM SERPL-SCNC: 137 MMOL/L (ref 136–145)
VANCOMYCIN SERPL-MCNC: 16.2 UG/ML (ref 10–20)
WBC # BLD AUTO: 6.6 10X3/UL (ref 4.8–10.8)

## 2020-06-29 NOTE — MORECARE
CASE MANAGEMENT DISCHARGE SUMMARY
 
 
PATIENT: SHERI BLAKE                     UNIT: [id number]
ACCOUNT#: [id number]                       ADM DATE: 20
AGE: 65     : 53  SEX: M            ROOM/BED: D.2116    
AUTHOR: MERVAT GRANGER                             PHYSICIAN:                               
 
REFERRING PHYSICIAN: REJI CAZARES MD          
DATE OF SERVICE: 20
Discharge Plan
 
 
Patient Name: SHERI BLAKE
[facility]
Encounter #: [id number]
Medical Record #: [id number]
: 1955
Planned Disposition: Skilled Nursing Facility
Anticipated Discharge Date: 
 
Discharge Date: 
Expected LOS: 
Initial Reviewer: [email or web address]
Initial Review Date: 2020
Generated: 20   4:00 pm 
Comments
 
DCP- Discharge Planning
 
Updated by [id number]: Elissa Rosario on 20   1:54 pm CT
CM met with patient regarding DC plans. PCP: Dr. Puri. Pharmacy: [facility]. 
DME: walker, cane. Emergency contact:  Paula Benedict (sister) [phone]. 
Residence: [facility] in a LT bed. Plan is to return to 
[facility]. Discussed HHS (states no home), Rehab, or SNF. CM will follow/ 
assist with DC needs/plans PRN.
  
 
 
External Providers
External Provider: [facility]
 
Next Contact Date: 
Service Request Date: 
Service Type: 
Resolution: 
 
Reviewer: 
Comments: 
 
 
 
 
 
 
Patient Name: SHERI BLAKE
Encounter #: [id number]
Page [id number]
 
 
 
 
 
Electronically Signed by MERVAT GRANGER on 20 at 1501
 
 
 
 
 
 
**All edits/amendments must be made on the electronic document**
 
DICTATION DATE: 20 1500     : DM  20 1500     
RPT#: [id number]                                DC DATE:        
                                               STATUS: ADM IN  
[facility]
[address] [place] [address]
***END OF REPORT***

## 2020-06-29 NOTE — MORECARE
CASE MANAGEMENT DISCHARGE SUMMARY
 
 
PATIENT: SHERI BLAKE                     UNIT: [id number]
ACCOUNT#: [id number]                       ADM DATE: 20
AGE: 65     : 53  SEX: M            ROOM/BED: D.2116    
AUTHOR: ELKIN,DOC                             PHYSICIAN:                               
 
REFERRING PHYSICIAN: REJI CAZARES MD          
DATE OF SERVICE: 20
Discharge Plan
 
 
Patient Name: SHERI BLAKE
Facility: [facility]:[place]
Encounter #: [id number]
Medical Record #: [id number]
: 1955
Planned Disposition: Skilled Nursing Facility
Anticipated Discharge Date: 
 
Discharge Date: 
Expected LOS: 
Initial Reviewer: [id number]
Initial Review Date: 2020
Generated: 20   4:09 pm 
Comments
 
DCP- Discharge Planning
 
Updated by [id number]: Elissa Rosario on 20   1:54 pm CT
CM met with patient regarding DC plans. PCP: Dr. Puri. Pharmacy: [facility]. 
DME: walker, cane. Emergency contact:  Paula Benedict (sister) [phone]. 
Residence: [facility] in a LT bed. Plan is to return to 
[facility]. Discussed HHS (states no home), Rehab, or SNF. CM will follow/ 
assist with DC needs/plans PRN.
 DCPIA - Discharge Planning Initial Assessment
 
Updated by [id number]: Elissa Rosario on 20   3:06 pm
*  Is the patient Alert and Oriented?
Yes
*  How many steps to enter\exit or inside your home?
 
*  PCP
Dr. Puri
*  Pharmacy
[facility]
*  Preadmission Environment
Long Term Nursing Home
*  Facility Name
 
[facility]
*  ADLs
Partial Dependent
*  Partial ADLs (Assistance needed)
Ambulation
Bathing
Medication Management
*  Equipment
Cane
Rolling Walker
*  Other Equipment
Other supplied by nursing facility
*  List name and contact numbers for known caregivers / representatives who 
currently or will assist patient after discharge:
Paula benedict (sister( [phone]
*  Verbal permission to speak to the caregivers and representatives has been 
obtained from the patient.
Yes
*  Community resources currently utilized
None
*  Please name any agencies selected above.
[facility]
*  Additional services required to return to the preadmission environment?
No
*  Can the patient safely return to the preadmission environment?
Yes
*  Has this patient been hospitalized within the prior 30 days at any 
hospital?
Yes
 
 
 
 
 
 
 
Last DP export: 20   2:00 p
Patient Name: SHERI BLAKE
 
Encounter #: [id number]
Page [id number]
 
 
 
 
 
Electronically Signed by MERVAT GRANGER on 20 at 1509
 
 
 
 
 
 
**All edits/amendments must be made on the electronic document**
 
DICTATION DATE: 20 150     : CHANDRAKANT  20 1509     
RPT#: [id number]                                DC DATE:        
                                               STATUS: ADM IN  
[facility]
 [place] [address]
***END OF REPORT***

## 2020-06-30 VITALS — DIASTOLIC BLOOD PRESSURE: 58 MMHG | SYSTOLIC BLOOD PRESSURE: 121 MMHG

## 2020-06-30 VITALS — DIASTOLIC BLOOD PRESSURE: 69 MMHG | SYSTOLIC BLOOD PRESSURE: 130 MMHG

## 2020-06-30 VITALS — SYSTOLIC BLOOD PRESSURE: 140 MMHG | DIASTOLIC BLOOD PRESSURE: 75 MMHG

## 2020-06-30 VITALS — DIASTOLIC BLOOD PRESSURE: 56 MMHG | SYSTOLIC BLOOD PRESSURE: 114 MMHG

## 2020-06-30 VITALS — DIASTOLIC BLOOD PRESSURE: 58 MMHG | SYSTOLIC BLOOD PRESSURE: 123 MMHG

## 2020-06-30 VITALS — SYSTOLIC BLOOD PRESSURE: 133 MMHG | DIASTOLIC BLOOD PRESSURE: 76 MMHG

## 2020-06-30 LAB
ALBUMIN SERPL-MCNC: 2.6 G/DL (ref 3.4–5)
ALP SERPL-CCNC: 131 U/L (ref 30–120)
ALT SERPL-CCNC: 30 U/L (ref 10–68)
ANION GAP SERPL CALC-SCNC: 11.1 MMOL/L (ref 8–16)
BASOPHILS NFR BLD AUTO: 0.5 % (ref 0–2)
BILIRUB SERPL-MCNC: 1.43 MG/DL (ref 0.2–1.3)
BUN SERPL-MCNC: 39 MG/DL (ref 7–18)
CALCIUM SERPL-MCNC: 5.4 MG/DL (ref 8.5–10.1)
CHLORIDE SERPL-SCNC: 97 MMOL/L (ref 98–107)
CO2 SERPL-SCNC: 28.6 MMOL/L (ref 21–32)
CREAT SERPL-MCNC: 2 MG/DL (ref 0.6–1.3)
EOSINOPHIL NFR BLD: 1 % (ref 0–7)
ERYTHROCYTE [DISTWIDTH] IN BLOOD BY AUTOMATED COUNT: 19.4 % (ref 11.5–14.5)
GLOBULIN SER-MCNC: 4.2 G/L
GLUCOSE SERPL-MCNC: 100 MG/DL (ref 74–106)
HCT VFR BLD CALC: 32 % (ref 42–54)
HGB BLD-MCNC: 10 G/DL (ref 13.5–17.5)
IMM GRANULOCYTES NFR BLD: 0.3 % (ref 0–5)
LYMPHOCYTES NFR BLD AUTO: 7.1 % (ref 15–50)
MCH RBC QN AUTO: 26.7 PG (ref 26–34)
MCHC RBC AUTO-ENTMCNC: 31.3 G/DL (ref 31–37)
MCV RBC: 85.6 FL (ref 80–100)
MONOCYTES NFR BLD: 6.4 % (ref 2–11)
NEUTROPHILS NFR BLD AUTO: 84.7 % (ref 40–80)
OSMOLALITY SERPL CALC.SUM OF ELEC: 274 MOSM/KG (ref 275–300)
PLATELET # BLD: 235 10X3/UL (ref 130–400)
PMV BLD AUTO: 9.6 FL (ref 7.4–10.4)
POTASSIUM SERPL-SCNC: 3.7 MMOL/L (ref 3.5–5.1)
PROT SERPL-MCNC: 6.8 G/DL (ref 6.4–8.2)
RBC # BLD AUTO: 3.74 10X6/UL (ref 4.2–6.1)
SODIUM SERPL-SCNC: 133 MMOL/L (ref 136–145)
WBC # BLD AUTO: 8 10X3/UL (ref 4.8–10.8)

## 2020-06-30 NOTE — NUR
Nutrition Follow-up:
Eating well overall.
Diet: Renal, Dental Soft
PO intake: 76% avg x 7 meals
Wt: 223# (6/29); 220.4# (6/24)
Labs noted: Na 133, Ca 5.4, Alb 2.6
Meds noted: Lasix, Tums, Folate, Calcitriol, Colace, electrolyte protocol
-Encourage PO intake and honor food preferences within diet restrictions.
-Monitor wt; noted daily wts ordered.
-RD following.

## 2020-06-30 NOTE — NUR
PATIENT GOT UP AND HAD A BOWEL MOVEMENT. PATIENT IS CONFUSED TO TIME AND
EVENTS.  HE IS NOW RESTING COMFORTABLY IN BED, WITH CALL LIGHT IN REACH.

## 2020-07-01 VITALS — SYSTOLIC BLOOD PRESSURE: 133 MMHG | DIASTOLIC BLOOD PRESSURE: 64 MMHG

## 2020-07-01 VITALS — DIASTOLIC BLOOD PRESSURE: 56 MMHG | SYSTOLIC BLOOD PRESSURE: 131 MMHG

## 2020-07-01 VITALS — SYSTOLIC BLOOD PRESSURE: 139 MMHG | DIASTOLIC BLOOD PRESSURE: 72 MMHG

## 2020-07-01 VITALS — DIASTOLIC BLOOD PRESSURE: 76 MMHG | SYSTOLIC BLOOD PRESSURE: 141 MMHG

## 2020-07-01 VITALS — SYSTOLIC BLOOD PRESSURE: 133 MMHG | DIASTOLIC BLOOD PRESSURE: 73 MMHG

## 2020-07-01 LAB
ALBUMIN SERPL-MCNC: 2.5 G/DL (ref 3.4–5)
ALP SERPL-CCNC: 119 U/L (ref 30–120)
ALT SERPL-CCNC: 24 U/L (ref 10–68)
ANION GAP SERPL CALC-SCNC: 12.5 MMOL/L (ref 8–16)
BASOPHILS NFR BLD AUTO: 0.5 % (ref 0–2)
BILIRUB SERPL-MCNC: 1.47 MG/DL (ref 0.2–1.3)
BUN SERPL-MCNC: 32 MG/DL (ref 7–18)
CALCIUM SERPL-MCNC: 5.4 MG/DL (ref 8.5–10.1)
CHLORIDE SERPL-SCNC: 101 MMOL/L (ref 98–107)
CO2 SERPL-SCNC: 28 MMOL/L (ref 21–32)
CREAT SERPL-MCNC: 1.7 MG/DL (ref 0.6–1.3)
EOSINOPHIL NFR BLD: 0.7 % (ref 0–7)
ERYTHROCYTE [DISTWIDTH] IN BLOOD BY AUTOMATED COUNT: 20.1 % (ref 11.5–14.5)
GLOBULIN SER-MCNC: 4.4 G/L
GLUCOSE SERPL-MCNC: 92 MG/DL (ref 74–106)
HCT VFR BLD CALC: 32.3 % (ref 42–54)
HGB BLD-MCNC: 9.9 G/DL (ref 13.5–17.5)
IMM GRANULOCYTES NFR BLD: 0.5 % (ref 0–5)
LYMPHOCYTES NFR BLD AUTO: 13.4 % (ref 15–50)
MCH RBC QN AUTO: 26.5 PG (ref 26–34)
MCHC RBC AUTO-ENTMCNC: 30.7 G/DL (ref 31–37)
MCV RBC: 86.6 FL (ref 80–100)
MONOCYTES NFR BLD: 7.9 % (ref 2–11)
NEUTROPHILS NFR BLD AUTO: 77 % (ref 40–80)
OSMOLALITY SERPL CALC.SUM OF ELEC: 282 MOSM/KG (ref 275–300)
PLATELET # BLD: 220 10X3/UL (ref 130–400)
PMV BLD AUTO: 9.7 FL (ref 7.4–10.4)
POTASSIUM SERPL-SCNC: 3.5 MMOL/L (ref 3.5–5.1)
PROT SERPL-MCNC: 6.9 G/DL (ref 6.4–8.2)
RBC # BLD AUTO: 3.73 10X6/UL (ref 4.2–6.1)
SODIUM SERPL-SCNC: 138 MMOL/L (ref 136–145)
VANCOMYCIN SERPL-MCNC: 15.6 UG/ML (ref 10–20)
WBC # BLD AUTO: 7.3 10X3/UL (ref 4.8–10.8)

## 2020-07-01 NOTE — NUR
PATIENT IS ASKING FOR PAIN MEDICATIONS FOR GENERAL PAIN. HE RECEIVED NORCO
5/325. PATIENT IS STILL CONFUSED TO TIME AND SITUATION. WE WILL MONITOR IS
MENTATION AND PAIN.

## 2020-07-02 VITALS — DIASTOLIC BLOOD PRESSURE: 73 MMHG | SYSTOLIC BLOOD PRESSURE: 140 MMHG

## 2020-07-02 VITALS — SYSTOLIC BLOOD PRESSURE: 144 MMHG | DIASTOLIC BLOOD PRESSURE: 68 MMHG

## 2020-07-02 VITALS — SYSTOLIC BLOOD PRESSURE: 120 MMHG | DIASTOLIC BLOOD PRESSURE: 67 MMHG

## 2020-07-02 LAB
ALBUMIN SERPL-MCNC: 2.6 G/DL (ref 3.4–5)
ALP SERPL-CCNC: 111 U/L (ref 30–120)
ALT SERPL-CCNC: 25 U/L (ref 10–68)
ANION GAP SERPL CALC-SCNC: 9.9 MMOL/L (ref 8–16)
BASOPHILS NFR BLD AUTO: 0.5 % (ref 0–2)
BILIRUB SERPL-MCNC: 1.34 MG/DL (ref 0.2–1.3)
BUN SERPL-MCNC: 26 MG/DL (ref 7–18)
CALCIUM SERPL-MCNC: 5.8 MG/DL (ref 8.5–10.1)
CHLORIDE SERPL-SCNC: 98 MMOL/L (ref 98–107)
CO2 SERPL-SCNC: 31.6 MMOL/L (ref 21–32)
CREAT SERPL-MCNC: 1.8 MG/DL (ref 0.6–1.3)
EOSINOPHIL NFR BLD: 1.8 % (ref 0–7)
ERYTHROCYTE [DISTWIDTH] IN BLOOD BY AUTOMATED COUNT: 20.5 % (ref 11.5–14.5)
GLOBULIN SER-MCNC: 4.7 G/L
GLUCOSE SERPL-MCNC: 84 MG/DL (ref 74–106)
HCT VFR BLD CALC: 33.9 % (ref 42–54)
HGB BLD-MCNC: 10.3 G/DL (ref 13.5–17.5)
IMM GRANULOCYTES NFR BLD: 0.2 % (ref 0–5)
LYMPHOCYTES NFR BLD AUTO: 14.3 % (ref 15–50)
MCH RBC QN AUTO: 27 PG (ref 26–34)
MCHC RBC AUTO-ENTMCNC: 30.4 G/DL (ref 31–37)
MCV RBC: 88.7 FL (ref 80–100)
MONOCYTES NFR BLD: 7.7 % (ref 2–11)
NEUTROPHILS NFR BLD AUTO: 75.5 % (ref 40–80)
OSMOLALITY SERPL CALC.SUM OF ELEC: 275 MOSM/KG (ref 275–300)
PLATELET # BLD: 228 10X3/UL (ref 130–400)
PMV BLD AUTO: 9.9 FL (ref 7.4–10.4)
POTASSIUM SERPL-SCNC: 3.5 MMOL/L (ref 3.5–5.1)
PROT SERPL-MCNC: 7.3 G/DL (ref 6.4–8.2)
RBC # BLD AUTO: 3.82 10X6/UL (ref 4.2–6.1)
SODIUM SERPL-SCNC: 136 MMOL/L (ref 136–145)
VANCOMYCIN SERPL-MCNC: 16.9 UG/ML (ref 10–20)
WBC # BLD AUTO: 8.3 10X3/UL (ref 4.8–10.8)

## 2020-07-02 NOTE — MORECARE
CASE MANAGEMENT DISCHARGE SUMMARY
 
 
PATIENT: JAELYN ATKINS                     UNIT: [id number]
ACCOUNT#: [id number]                       ADM DATE: 20
AGE: 65     : 53  SEX: M            ROOM/BED: D.2116    
AUTHOR: ELKIN,DOC                             PHYSICIAN:                               
 
REFERRING PHYSICIAN: REJI CAZARES MD          
DATE OF SERVICE: 20
Discharge Plan
 
 
Patient Name: JAELYN ATKINS
Facility: [facility]:[place]
Encounter #: [id number]
Medical Record #: [id number]
: 1955
Planned Disposition: Skilled Nursing Facility
Anticipated Discharge Date: 
 
Discharge Date: 
Expected LOS: 
Initial Reviewer: [id number]
Initial Review Date: 2020
Generated: 20   1:59 pm 
Comments
 
DCP- Discharge Planning
 
Updated by [id number]: Elissa Rosario on 20  11:58 am CT
: Contacted [facility] representative for DC back to the facility. The 
facility could not accept on , due to transportation, so will   
around 1230.  
  
:  Patient is returning to [facility] to a LT bed via NH van..
DCP- Discharge Planning
 
Updated by [id number]: Elissa Rosario on 20   1:54 pm CT
CM met with patient regarding DC plans. PCP: Dr. Puri. Pharmacy: [facility]. 
DME: walker, cane. Emergency contact:  Paula Benedict (sister) [phone]. 
Residence: [facility] in a LT bed. Plan is to return to 
[facility]. Discussed HHS (states no home), Rehab, or SNF. CM will follow/ 
assist with DC needs/plans PRN.
 DCPIA - Discharge Planning Initial Assessment
 
Updated by [id number]: Elissa Rosario on 20   3:06 pm
*  Is the patient Alert and Oriented?
Yes
*  How many steps to enter\exit or inside your home?
 
 
*  PCP
Dr. Puri
*  Pharmacy
[facility]
*  Preadmission Environment
Long Term Nursing Home
*  Facility Name
[facility]
*  ADLs
Partial Dependent
*  Partial ADLs (Assistance needed)
Ambulation
Bathing
Medication Management
*  Equipment
Cane
Rolling Walker
*  Other Equipment
Other supplied by nursing facility
*  List name and contact numbers for known caregivers / representatives who 
currently or will assist patient after discharge:
Paula benedict (sister( [phone]
*  Verbal permission to speak to the caregivers and representatives has been 
obtained from the patient.
Yes
*  Community resources currently utilized
None
*  Please name any agencies selected above.
[facility]
*  Additional services required to return to the preadmission environment?
No
*  Can the patient safely return to the preadmission environment?
Yes
*  Has this patient been hospitalized within the prior 30 days at any 
hospital?
Yes
 
 
 
 
 
Coverage Notice
 
Reviewer: [email or web address] Eduardo Rosario
 
Notice Issued Date-Time: 2020  15:13
Notice Type: Patient Choice Letter
 
Notice Delivered To: Patient
Relationship to Patient: Self
Representative Name: Jaelyn Atkins
 
 
Delivery Method: HAND - Hand Delivered
Rahel Days:
Prior Verbal Notification: 
 
Recipient Understood Notice: Yes
Recipient Signature: Yes
Med Rec Note Co-signed by Attending:
 
Coverage Notice Comment:  DC IMM signed/given to patient. Original to chart.
Reviewer: [email or web address] Eduardo Rosario
 
Notice Issued Date-Time: 2020  12:58
Notice Type: IM Discharge Notice
 
Notice Delivered To: Patient
Relationship to Patient: Self
Representative Name: Jaelyn Gonzales
 
Delivery Method: HAND - Hand Delivered
Rahel Days:
Prior Verbal Notification: 
 
Recipient Understood Notice: Yes
Recipient Signature: Yes
Med Rec Note Co-signed by Attending:
 
Coverage Notice Comment:  DC IMM signed
 
Last DP export: 20   2:09 p
Patient Name: JAELYN ATKINS
Encounter #: [id number]
Page [phone]
 
 
 
 
 
Electronically Signed by MERVAT GRANGER on 20 at 1259
 
 
 
 
 
 
**All edits/amendments must be made on the electronic document**
 
DICTATION DATE: 20 1259     : CHANDRAKANT  20 1259     
RPT#: [id number]                                DC DATE:        
                                               STATUS: ADM IN  
[facility]
[address] [place] [address]
***END OF REPORT***

## 2020-07-02 NOTE — NUR
PATIENT IS NOW SLEEPING COMFORTABLY IN BED. HE IS CONFUSED TO TIME. HE HAS HAD
NORCO TWICE FOR GENERAL PAIN. PATIENT IS SUPPOSED TO DISCHARGE TODAY.

## 2020-07-13 ENCOUNTER — HOSPITAL ENCOUNTER (INPATIENT)
Dept: HOSPITAL 84 - D.ER | Age: 65
LOS: 2 days | DRG: 871 | End: 2020-07-15
Attending: FAMILY MEDICINE | Admitting: FAMILY MEDICINE
Payer: MEDICARE

## 2020-07-13 VITALS — SYSTOLIC BLOOD PRESSURE: 147 MMHG | DIASTOLIC BLOOD PRESSURE: 67 MMHG

## 2020-07-13 VITALS — DIASTOLIC BLOOD PRESSURE: 91 MMHG | SYSTOLIC BLOOD PRESSURE: 175 MMHG

## 2020-07-13 VITALS — DIASTOLIC BLOOD PRESSURE: 63 MMHG | SYSTOLIC BLOOD PRESSURE: 124 MMHG

## 2020-07-13 VITALS — SYSTOLIC BLOOD PRESSURE: 156 MMHG | DIASTOLIC BLOOD PRESSURE: 96 MMHG

## 2020-07-13 VITALS
HEIGHT: 70 IN | BODY MASS INDEX: 31.35 KG/M2 | HEIGHT: 70 IN | BODY MASS INDEX: 31.35 KG/M2 | WEIGHT: 219 LBS | WEIGHT: 219 LBS | BODY MASS INDEX: 31.35 KG/M2 | BODY MASS INDEX: 31.35 KG/M2

## 2020-07-13 VITALS — SYSTOLIC BLOOD PRESSURE: 130 MMHG | DIASTOLIC BLOOD PRESSURE: 61 MMHG

## 2020-07-13 VITALS — SYSTOLIC BLOOD PRESSURE: 157 MMHG | DIASTOLIC BLOOD PRESSURE: 67 MMHG

## 2020-07-13 VITALS — DIASTOLIC BLOOD PRESSURE: 70 MMHG | SYSTOLIC BLOOD PRESSURE: 136 MMHG

## 2020-07-13 VITALS — DIASTOLIC BLOOD PRESSURE: 73 MMHG | SYSTOLIC BLOOD PRESSURE: 169 MMHG

## 2020-07-13 DIAGNOSIS — J44.9: ICD-10-CM

## 2020-07-13 DIAGNOSIS — K72.90: ICD-10-CM

## 2020-07-13 DIAGNOSIS — I50.33: ICD-10-CM

## 2020-07-13 DIAGNOSIS — G93.41: ICD-10-CM

## 2020-07-13 DIAGNOSIS — E87.5: ICD-10-CM

## 2020-07-13 DIAGNOSIS — N18.9: ICD-10-CM

## 2020-07-13 DIAGNOSIS — E87.1: ICD-10-CM

## 2020-07-13 DIAGNOSIS — E87.2: ICD-10-CM

## 2020-07-13 DIAGNOSIS — E11.9: ICD-10-CM

## 2020-07-13 DIAGNOSIS — A41.9: Primary | ICD-10-CM

## 2020-07-13 DIAGNOSIS — D64.9: ICD-10-CM

## 2020-07-13 DIAGNOSIS — N17.9: ICD-10-CM

## 2020-07-13 DIAGNOSIS — I71.4: ICD-10-CM

## 2020-07-13 DIAGNOSIS — E78.5: ICD-10-CM

## 2020-07-13 DIAGNOSIS — K52.9: ICD-10-CM

## 2020-07-13 DIAGNOSIS — E03.9: ICD-10-CM

## 2020-07-13 DIAGNOSIS — J96.01: ICD-10-CM

## 2020-07-13 DIAGNOSIS — I13.0: ICD-10-CM

## 2020-07-13 DIAGNOSIS — E83.51: ICD-10-CM

## 2020-07-13 LAB
ALBUMIN SERPL-MCNC: 3.3 G/DL (ref 3.4–5)
ALP SERPL-CCNC: 166 U/L (ref 30–120)
ALT SERPL-CCNC: 837 U/L (ref 10–68)
AMPHETAMINES UR QL SCN: NEGATIVE QUAL
AMYLASE SERPL-CCNC: 43 U/L (ref 25–115)
ANION GAP SERPL CALC-SCNC: 27.4 MMOL/L (ref 8–16)
ANION GAP SERPL CALC-SCNC: 27.6 MMOL/L (ref 8–16)
APTT BLD: 39.6 SECONDS (ref 22.8–39.4)
BACTERIA #/AREA URNS HPF: (no result) /HPF
BARBITURATES UR QL SCN: NEGATIVE QUAL
BENZODIAZ UR QL SCN: NEGATIVE QUAL
BILIRUB SERPL-MCNC: 3.26 MG/DL (ref 0.2–1.3)
BILIRUB SERPL-MCNC: NEGATIVE MG/DL
BUN SERPL-MCNC: 43 MG/DL (ref 7–18)
BUN SERPL-MCNC: 44 MG/DL (ref 7–18)
BZE UR QL SCN: NEGATIVE QUAL
CALCIUM SERPL-MCNC: 6 MG/DL (ref 8.5–10.1)
CALCIUM SERPL-MCNC: 6.2 MG/DL (ref 8.5–10.1)
CANNABINOIDS UR QL SCN: NEGATIVE QUAL
CHLORIDE SERPL-SCNC: 92 MMOL/L (ref 98–107)
CHLORIDE SERPL-SCNC: 96 MMOL/L (ref 98–107)
CK MB SERPL-MCNC: 111.9 U/L (ref 0–3.6)
CK SERPL-CCNC: 2109 UL (ref 21–232)
CO2 SERPL-SCNC: 18.8 MMOL/L (ref 21–32)
CO2 SERPL-SCNC: 20.1 MMOL/L (ref 21–32)
CREAT SERPL-MCNC: 4.5 MG/DL (ref 0.6–1.3)
CREAT SERPL-MCNC: 4.5 MG/DL (ref 0.6–1.3)
ERYTHROCYTE [DISTWIDTH] IN BLOOD BY AUTOMATED COUNT: 21.4 % (ref 11.5–14.5)
GLOBULIN SER-MCNC: 4.5 G/L
GLUCOSE SERPL-MCNC: 128 MG/DL (ref 74–106)
GLUCOSE SERPL-MCNC: 32 MG/DL (ref 74–106)
GLUCOSE SERPL-MCNC: NEGATIVE MG/DL
HCT VFR BLD CALC: 37.7 % (ref 42–54)
HGB BLD-MCNC: 11 G/DL (ref 13.5–17.5)
INR PPP: 2.54 (ref 0.85–1.17)
KETONES UR STRIP-MCNC: NEGATIVE MG/DL
LIPASE SERPL-CCNC: 69 U/L (ref 73–393)
LYMPHOCYTES NFR BLD AUTO: 5.3 % (ref 15–50)
MAGNESIUM SERPL-MCNC: 1.8 MG/DL (ref 1.8–2.4)
MAGNESIUM SERPL-MCNC: 1.8 MG/DL (ref 1.8–2.4)
MCH RBC QN AUTO: 26.2 PG (ref 26–34)
MCHC RBC AUTO-ENTMCNC: 29.2 G/DL (ref 31–37)
MCV RBC: 89.8 FL (ref 80–100)
NEUTROPHILS NFR BLD AUTO: 88.6 % (ref 40–80)
NITRITE UR-MCNC: NEGATIVE MG/ML
OPIATES UR QL SCN: POSITIVE QUAL
OSMOLALITY SERPL CALC.SUM OF ELEC: 272 MOSM/KG (ref 275–300)
OSMOLALITY SERPL CALC.SUM OF ELEC: 286 MOSM/KG (ref 275–300)
PCP UR QL SCN: NEGATIVE QUAL
PH UR STRIP: 5 [PH] (ref 5–6)
PLATELET # BLD: 203 10X3/UL (ref 130–400)
PMV BLD AUTO: 10.3 FL (ref 7.4–10.4)
POTASSIUM SERPL-SCNC: 5.4 MMOL/L (ref 3.5–5.1)
POTASSIUM SERPL-SCNC: 6.5 MMOL/L (ref 3.5–5.1)
PROT SERPL-MCNC: 7.8 G/DL (ref 6.4–8.2)
PROTHROMBIN TIME: 26.9 SECONDS (ref 11.6–15)
RBC # BLD AUTO: 4.2 10X6/UL (ref 4.2–6.1)
RBC #/AREA URNS HPF: (no result) /HPF (ref 0–5)
SODIUM SERPL-SCNC: 133 MMOL/L (ref 136–145)
SODIUM SERPL-SCNC: 137 MMOL/L (ref 136–145)
SP GR UR STRIP: 1.02 (ref 1–1.02)
TROPONIN I SERPL-MCNC: 0.09 NG/ML (ref 0–0.06)
TROPONIN I SERPL-MCNC: 0.14 NG/ML (ref 0–0.06)
UROBILINOGEN UR-MCNC: NORMAL MG/DL
WBC # BLD AUTO: 13.8 10X3/UL (ref 4.8–10.8)
WBC #/AREA URNS HPF: (no result) /HPF

## 2020-07-13 NOTE — NUR
DR BERG ON TELEPHONE.  ADVISED TO CALL A CODE SEPSIS ON PT.  ORDERS TAKEN FOR
PT TO RECEIVE 30ML/KG OF FLUID.  PT HAD RECEIVED 1000CC NS SINCE ARRIVAL. 
ORDER PLACED FOR 2000ML LACTATED RINGER BOLUS.  CODE SEPSIS INITIATED.

## 2020-07-13 NOTE — NUR
DR MILLS NOTIFED ABOUT LATIC OF 6.3, TROP OF 0.36, AND CA OF 6.2, WAS INFROMED
TO CONT CURRENT TREATMENT AND WILL LET RENAL HANDLE ALL ABNORMAL CHEMISTRY.

## 2020-07-13 NOTE — NUR
PATIENT COMPLAINED OF SEVERE PAIN WITH THE BRIONES CATH, AND WAS URINATING AROUND
IT.  REMOVED AT THIS TIME.

## 2020-07-13 NOTE — NUR
pt arrived on unit via stretcher accompanied by er staff. patient transferred
to icu bed and hooked up to icu monitors. pt complaining of intermittent pain
in abdomen, unable to urinate at this time. difficulty with foleys in the er
bladder scan performed at this time with 3 mls urine found. pt states he has
pain in his chest, back, and legs also. pt has 4+ pitting edema of lower
extremities, saT 97% ON NC ADMISSIONS ASSESSMENT, HISTORY, AND SUICIDE
SCREENING COMPLETED AT THIS TIME. VSS CPOC

## 2020-07-14 VITALS — DIASTOLIC BLOOD PRESSURE: 58 MMHG | SYSTOLIC BLOOD PRESSURE: 112 MMHG

## 2020-07-14 VITALS — DIASTOLIC BLOOD PRESSURE: 64 MMHG | SYSTOLIC BLOOD PRESSURE: 128 MMHG

## 2020-07-14 VITALS — SYSTOLIC BLOOD PRESSURE: 122 MMHG | DIASTOLIC BLOOD PRESSURE: 53 MMHG

## 2020-07-14 VITALS — DIASTOLIC BLOOD PRESSURE: 69 MMHG | SYSTOLIC BLOOD PRESSURE: 89 MMHG

## 2020-07-14 VITALS — DIASTOLIC BLOOD PRESSURE: 62 MMHG | SYSTOLIC BLOOD PRESSURE: 112 MMHG

## 2020-07-14 VITALS — SYSTOLIC BLOOD PRESSURE: 124 MMHG | DIASTOLIC BLOOD PRESSURE: 65 MMHG

## 2020-07-14 VITALS — SYSTOLIC BLOOD PRESSURE: 118 MMHG | DIASTOLIC BLOOD PRESSURE: 76 MMHG

## 2020-07-14 VITALS — DIASTOLIC BLOOD PRESSURE: 75 MMHG | SYSTOLIC BLOOD PRESSURE: 135 MMHG

## 2020-07-14 VITALS — DIASTOLIC BLOOD PRESSURE: 59 MMHG | SYSTOLIC BLOOD PRESSURE: 116 MMHG

## 2020-07-14 VITALS — SYSTOLIC BLOOD PRESSURE: 94 MMHG | DIASTOLIC BLOOD PRESSURE: 57 MMHG

## 2020-07-14 VITALS — DIASTOLIC BLOOD PRESSURE: 63 MMHG | SYSTOLIC BLOOD PRESSURE: 127 MMHG

## 2020-07-14 VITALS — DIASTOLIC BLOOD PRESSURE: 68 MMHG | SYSTOLIC BLOOD PRESSURE: 124 MMHG

## 2020-07-14 VITALS — DIASTOLIC BLOOD PRESSURE: 63 MMHG | SYSTOLIC BLOOD PRESSURE: 124 MMHG

## 2020-07-14 VITALS — DIASTOLIC BLOOD PRESSURE: 67 MMHG | SYSTOLIC BLOOD PRESSURE: 119 MMHG

## 2020-07-14 VITALS — SYSTOLIC BLOOD PRESSURE: 130 MMHG | DIASTOLIC BLOOD PRESSURE: 61 MMHG

## 2020-07-14 VITALS — DIASTOLIC BLOOD PRESSURE: 56 MMHG | SYSTOLIC BLOOD PRESSURE: 112 MMHG

## 2020-07-14 VITALS — SYSTOLIC BLOOD PRESSURE: 116 MMHG | DIASTOLIC BLOOD PRESSURE: 88 MMHG

## 2020-07-14 VITALS — SYSTOLIC BLOOD PRESSURE: 122 MMHG | DIASTOLIC BLOOD PRESSURE: 55 MMHG

## 2020-07-14 VITALS — DIASTOLIC BLOOD PRESSURE: 64 MMHG | SYSTOLIC BLOOD PRESSURE: 122 MMHG

## 2020-07-14 VITALS — DIASTOLIC BLOOD PRESSURE: 59 MMHG | SYSTOLIC BLOOD PRESSURE: 97 MMHG

## 2020-07-14 VITALS — SYSTOLIC BLOOD PRESSURE: 111 MMHG | DIASTOLIC BLOOD PRESSURE: 62 MMHG

## 2020-07-14 VITALS — SYSTOLIC BLOOD PRESSURE: 115 MMHG | DIASTOLIC BLOOD PRESSURE: 69 MMHG

## 2020-07-14 VITALS — SYSTOLIC BLOOD PRESSURE: 120 MMHG | DIASTOLIC BLOOD PRESSURE: 48 MMHG

## 2020-07-14 VITALS — SYSTOLIC BLOOD PRESSURE: 110 MMHG | DIASTOLIC BLOOD PRESSURE: 55 MMHG

## 2020-07-14 VITALS — SYSTOLIC BLOOD PRESSURE: 130 MMHG | DIASTOLIC BLOOD PRESSURE: 57 MMHG

## 2020-07-14 LAB
ALBUMIN SERPL-MCNC: 3.1 G/DL (ref 3.4–5)
ALP SERPL-CCNC: 164 U/L (ref 30–120)
ALT SERPL-CCNC: 1281 U/L (ref 10–68)
ANION GAP SERPL CALC-SCNC: 19 MMOL/L (ref 8–16)
ANION GAP SERPL CALC-SCNC: 21.1 MMOL/L (ref 8–16)
BILIRUB SERPL-MCNC: 3.02 MG/DL (ref 0.2–1.3)
BUN SERPL-MCNC: 53 MG/DL (ref 7–18)
BUN SERPL-MCNC: 54 MG/DL (ref 7–18)
CALCIUM SERPL-MCNC: 6.1 MG/DL (ref 8.5–10.1)
CALCIUM SERPL-MCNC: 6.1 MG/DL (ref 8.5–10.1)
CHLORIDE SERPL-SCNC: 98 MMOL/L (ref 98–107)
CHLORIDE SERPL-SCNC: 99 MMOL/L (ref 98–107)
CK MB SERPL-MCNC: 129.8 U/L (ref 0–3.6)
CK MB SERPL-MCNC: 130.8 U/L (ref 0–3.6)
CK SERPL-CCNC: 2803 UL (ref 21–232)
CK SERPL-CCNC: 3228 UL (ref 21–232)
CO2 SERPL-SCNC: 21.3 MMOL/L (ref 21–32)
CO2 SERPL-SCNC: 25.1 MMOL/L (ref 21–32)
CREAT SERPL-MCNC: 5.1 MG/DL (ref 0.6–1.3)
CREAT SERPL-MCNC: 5.2 MG/DL (ref 0.6–1.3)
ERYTHROCYTE [DISTWIDTH] IN BLOOD BY AUTOMATED COUNT: 21.7 % (ref 11.5–14.5)
GLOBULIN SER-MCNC: 4.2 G/L
GLUCOSE SERPL-MCNC: 108 MG/DL (ref 74–106)
GLUCOSE SERPL-MCNC: 141 MG/DL (ref 74–106)
HCT VFR BLD CALC: 38.5 % (ref 42–54)
HGB BLD-MCNC: 11.6 G/DL (ref 13.5–17.5)
LYMPHOCYTES NFR BLD AUTO: 7.6 % (ref 15–50)
MAGNESIUM SERPL-MCNC: 1.9 MG/DL (ref 1.8–2.4)
MCH RBC QN AUTO: 26.8 PG (ref 26–34)
MCHC RBC AUTO-ENTMCNC: 30.1 G/DL (ref 31–37)
MCV RBC: 88.9 FL (ref 80–100)
NEUTROPHILS NFR BLD AUTO: 84.6 % (ref 40–80)
OSMOLALITY SERPL CALC.SUM OF ELEC: 284 MOSM/KG (ref 275–300)
OSMOLALITY SERPL CALC.SUM OF ELEC: 286 MOSM/KG (ref 275–300)
PLATELET # BLD: 275 10X3/UL (ref 130–400)
PMV BLD AUTO: 10.1 FL (ref 7.4–10.4)
POTASSIUM SERPL-SCNC: 6.4 MMOL/L (ref 3.5–5.1)
POTASSIUM SERPL-SCNC: 7.1 MMOL/L (ref 3.5–5.1)
PROT SERPL-MCNC: 7.3 G/DL (ref 6.4–8.2)
RBC # BLD AUTO: 4.33 10X6/UL (ref 4.2–6.1)
SODIUM SERPL-SCNC: 135 MMOL/L (ref 136–145)
SODIUM SERPL-SCNC: 135 MMOL/L (ref 136–145)
TROPONIN I SERPL-MCNC: 0.23 NG/ML (ref 0–0.06)
TROPONIN I SERPL-MCNC: 0.26 NG/ML (ref 0–0.06)
WBC # BLD AUTO: 18.2 10X3/UL (ref 4.8–10.8)

## 2020-07-14 NOTE — NUR
SHIFT ASSESSMENT COMPLETED. PT CARE ASSUMED. MONITORS ON AND WORKING, VSS,
BIPAP ON, BRIONES CATH NOTED. SEE FLOW SHEET FOR FURTHER DETAILS. WILL CONTINUE
TO OBSERVE.

## 2020-07-14 NOTE — NUR
PT BECOMING INCREASINGLY AGITATED. SPOKE WITH DR DELGADO HERNANDEZ'JACKSON ORDERS FOR PRN
ATIVAN IV. FAMILY AT BEDSIDE, SPOKE WITH FAMILY NUMEROUS TIMES THROUGHOUT THE
LAST FEW HOURS. MONITORS ON AND WORKING, VSS. WILL CONTINUE TO OSBERVE.

## 2020-07-15 VITALS — SYSTOLIC BLOOD PRESSURE: 105 MMHG | DIASTOLIC BLOOD PRESSURE: 45 MMHG

## 2020-07-15 VITALS — SYSTOLIC BLOOD PRESSURE: 126 MMHG | DIASTOLIC BLOOD PRESSURE: 59 MMHG

## 2020-07-15 VITALS — SYSTOLIC BLOOD PRESSURE: 116 MMHG | DIASTOLIC BLOOD PRESSURE: 59 MMHG

## 2020-07-15 VITALS — SYSTOLIC BLOOD PRESSURE: 112 MMHG | DIASTOLIC BLOOD PRESSURE: 47 MMHG

## 2020-07-15 VITALS — SYSTOLIC BLOOD PRESSURE: 108 MMHG | DIASTOLIC BLOOD PRESSURE: 64 MMHG

## 2020-07-15 VITALS — DIASTOLIC BLOOD PRESSURE: 65 MMHG | SYSTOLIC BLOOD PRESSURE: 123 MMHG

## 2020-07-15 VITALS — SYSTOLIC BLOOD PRESSURE: 117 MMHG | DIASTOLIC BLOOD PRESSURE: 7 MMHG

## 2020-07-15 VITALS — DIASTOLIC BLOOD PRESSURE: 62 MMHG | SYSTOLIC BLOOD PRESSURE: 114 MMHG

## 2020-07-15 VITALS — DIASTOLIC BLOOD PRESSURE: 41 MMHG | SYSTOLIC BLOOD PRESSURE: 111 MMHG

## 2020-07-15 VITALS — DIASTOLIC BLOOD PRESSURE: 64 MMHG | SYSTOLIC BLOOD PRESSURE: 94 MMHG

## 2020-07-15 LAB
ALBUMIN SERPL-MCNC: 3.2 G/DL (ref 3.4–5)
ALP SERPL-CCNC: 180 U/L (ref 30–120)
ALT SERPL-CCNC: 1524 U/L (ref 10–68)
ANION GAP SERPL CALC-SCNC: 16.7 MMOL/L (ref 8–16)
BILIRUB SERPL-MCNC: 3.62 MG/DL (ref 0.2–1.3)
BUN SERPL-MCNC: 61 MG/DL (ref 7–18)
CALCIUM SERPL-MCNC: 5.5 MG/DL (ref 8.5–10.1)
CHLORIDE SERPL-SCNC: 99 MMOL/L (ref 98–107)
CO2 SERPL-SCNC: 27.5 MMOL/L (ref 21–32)
CREAT SERPL-MCNC: 6.3 MG/DL (ref 0.6–1.3)
ERYTHROCYTE [DISTWIDTH] IN BLOOD BY AUTOMATED COUNT: 21.8 % (ref 11.5–14.5)
GLOBULIN SER-MCNC: 4.7 G/L
GLUCOSE SERPL-MCNC: 101 MG/DL (ref 74–106)
HCT VFR BLD CALC: 36.5 % (ref 42–54)
HGB BLD-MCNC: 10.9 G/DL (ref 13.5–17.5)
LYMPHOCYTES NFR BLD AUTO: 4.7 % (ref 15–50)
MAGNESIUM SERPL-MCNC: 2 MG/DL (ref 1.8–2.4)
MCH RBC QN AUTO: 26.9 PG (ref 26–34)
MCHC RBC AUTO-ENTMCNC: 29.9 G/DL (ref 31–37)
MCV RBC: 90.1 FL (ref 80–100)
NEUTROPHILS NFR BLD AUTO: 89.3 % (ref 40–80)
OSMOLALITY SERPL CALC.SUM OF ELEC: 288 MOSM/KG (ref 275–300)
PLATELET # BLD: 251 10X3/UL (ref 130–400)
PMV BLD AUTO: 10.4 FL (ref 7.4–10.4)
POTASSIUM SERPL-SCNC: 7.2 MMOL/L (ref 3.5–5.1)
PROT SERPL-MCNC: 7.9 G/DL (ref 6.4–8.2)
RBC # BLD AUTO: 4.05 10X6/UL (ref 4.2–6.1)
SODIUM SERPL-SCNC: 136 MMOL/L (ref 136–145)
WBC # BLD AUTO: 13.6 10X3/UL (ref 4.8–10.8)

## 2020-07-15 NOTE — NUR
Nutrition follow-up:
Diet: Renal
PO intake ~50% of meals
Labs reviewed
Wt: 219#
Pt with hospice eval ordered
RDN following.

## 2020-07-15 NOTE — MORECARE
CASE MANAGEMENT DISCHARGE SUMMARY
 
 
PATIENT: SHERI BLAKE                     UNIT: [id number]
ACCOUNT#: [id number]                       ADM DATE: 20
AGE: 65     : 53  SEX: M            ROOM/BED: D.2305    
AUTHOR: MERVAT GRANGER                             PHYSICIAN:                               
 
REFERRING PHYSICIAN: PHONG BERG MD                
DATE OF SERVICE: 07/15/20
Discharge Plan
 
 
Patient Name: SHERI BLAKE
[facility]
Encounter #: [id number]
Medical Record #: [id number]
: 1955
Planned Disposition: Hospice Medical Facility
Anticipated Discharge Date: 
 
Discharge Date: 
Expected LOS: 
Initial Reviewer: [id number]
Initial Review Date: 2020
Generated: 7/15/20   8:12 pm 
Comments
 
DCP- Discharge Planning
 
Updated by [id number]: Prnicess Gomes on 7/15/20   6:09 pm CT
CM WAS ADVISED THE FAMILY, HIS SISTERS, WOULD LIKE A HOSPICE CONSULT.  
MD COGNIZANT OF DECISION.  
TC TO [facility]. NO AVAILABLE BEDS FOR [facility].  
CM SPOKE W/ YOVANA. DISCUSSED REFERRAL. FAXED CLINICAL FOR MD REVIEW.  
1310 REC CB FROM EV THAT JOSH WOULD BE ON SITE TO MEET W/ THE FAMILY.  
THE SISTERS WERE AT THE BEDSIDE. EV TO CALL AND SPEAK WITH THE FAMILY.  
0962 JOSH ON SITE FOR EVALUATION AND TO MEET WITH THE FAMILY.  
LONNIE HAD SPOKEN W/ NURSING STAFF. COVID 19 TESTING REQUIRED BY [facility].
   
NURSE TO FORWARD INFORMATION.  
AWAIT MD ASSESSMENT AND DECISION.
  
 
 
 
 
 
 
 
 
 
Last DP export: 7/15/20   6:00 p
Patient Name: SHERI BLAKE
Encounter #: [id number]
Page [phone]
 
 
 
 
 
Electronically Signed by MERVAT GRANGER on 07/15/20 at 1913
 
 
 
 
 
 
**All edits/amendments must be made on the electronic document**
 
DICTATION DATE: 07/15/20 1912     : CHANDRAKANT  07/15/20 1912     
RPT#: [id number]                                DC DATE:        
                                               STATUS: ADM IN  
[facility]
 [place] [address]
***END OF REPORT***

## 2020-07-15 NOTE — EC
PATIENT:SHERI BLAKE                 DATE OF SERVICE: 07/13/20
SEX: M                                  MEDICAL RECORD: [id number]
DATE OF BIRTH: [birth year]                        LOCATION:[facility]     D230
AGE OF PATIENT: 65                             ADMISSION DATE: 07/13/20
 
REFERRING PHYSICIAN:                               
 
INTERPRETING PHYSICIAN: OMAR GUARADDO MD          
 
 
 
                             ECHOCARDIOGRAM REPORT
  ECHO CHARGES 5               ECHO LIMITED                  Date: 07/14/20
 
 
 
CLINICAL DIAGNOSIS: CHF                           
 
                         ECHOCARDIOGRAPHIC MEASUREMENTS
      (adult normal given)
   AC root (d.<3.7cm) 0    cm   LV Septum d (<1.2 cm> 0    cm
      Valve Excursion 0    cm     LV Septum (systole) 0    cm
Left Atria (s.<4.0cm> 0    cm          LVPW d(<1.2cm) 0    cm
        RV (d.<2.3cm) 0    cm           LVPW (sytole) 0    cm
  LV diastole(<5.6CM) 0    cm       MV E-F(>70mm/sec) 0    cm
           LV systole 0    cm           LVOT Diameter 0    cm
       MV exc.(>10mm) 0    cm
Est.ejection fraction (50-75%) 0   %
 
   DOPPLER:
     LVIT      cm/sec A 0    cm/sec E 0     cm/sec
       LA 0    cm/sec      RVSP 38.0 mmHg
     LVOT 0    cm/sec   AOP1/2T      m/s
  Asc. Ao 0    cm/sec
     RVOT 0    cm/sec
       RA 0    cm/sec
       PA 0    cm/sec
 AV Gradient Peak 0    mmHg  AV Mean 0    mmHg  AV Area 0    cm
 MV Gradient Peak 0    mmHg  MV Mean 0    mmHg  MV Area 0    cm
   COMMENTS: 0                                            
 
 
 Cardiac Sonographer: Enoch               DHARMESH DOWNS             
      Cardiologist: 3          Dr. Hutson             
             TAPE# PACS           
                                       Pericardial Effusion Y                        
 
 
DATE OF SERVICE:  
 
2D, color flow only.
 
Grossly, LVH appears present.  LV internal dimensions are normal.  Wall motion
is normal.  EF is greater than or equal to 55%.  Aortic valve appears tricuspid
with good valve excursion.  Mild AI.  Left atrium appears normal.  Mitral valve
appears normal with mild MR.  Right-sided chambers appear grossly normal.  Mild
TR.
 
 
 
 
ECHOCARDIOGRAM REPORT                          [id number]    SHERI BLAKE         
 
 
NTS:[id number] Voice Confirmation ID: [id number] DOCUMENT ID: [id number]
                                           
                                           OMAR GUARDADO MD          
 
 
 
Electronically Signed by OMAR GUARDADO on 07/15/20 at 0808
 
 
 
 
 
 
 
 
 
 
 
 
 
 
 
 
 
 
 
 
 
 
 
 
 
 
 
 
 
 
 
 
 
 
 
 
 
 
 
CC:                                                             [id number]
DICTATION DATE: 07/14/20 7283     :     07/14/20 1813      ADM IN  
                                                                              
[facility]                                          
[address] [id number]

## 2020-07-15 NOTE — MORECARE
CASE MANAGEMENT DISCHARGE SUMMARY
 
 
PATIENT: SHERI BLAKE                     UNIT: [id number]
ACCOUNT#: [id number]                       ADM DATE: 20
AGE: 65     : [birth year]  SEX: M            ROOM/BED: D.2305    
AUTHOR: MERVAT GRANGER                             PHYSICIAN:                               
 
REFERRING PHYSICIAN: PHONG BERG MD                
DATE OF SERVICE: 07/15/20
Discharge Plan
 
 
Patient Name: SHERI BLAKE
Facility: [facility]:[place]
Encounter #: [id number]
Medical Record #: [id number]
: 1955
Planned Disposition: Hospice Medical Facility
Anticipated Discharge Date: 
 
Discharge Date: 
Expected LOS: 
Initial Reviewer: [email or web address]
Initial Review Date: 2020
Generated: 7/15/20   8:25 pm 
Comments
 
DCP- Discharge Planning
 
Updated by [id number]: Princess Gomes on 7/15/20   6:24 pm CT
PATIENT HAD BEEN A RESIDENT AT [facility] IN LONG [facility]M BED. 
PHONE NUMBER [phone]. PREVIOUS ADMISSION W/ DISCHARGE 2020.  
EMERGENCY CONTACT IS HIS SISTER ISAIAS WATTERS- [phone].   
PCP- DR DOLL  
[facility]  
PATIENT W/ PROGRESSIVE LETHARGY AND DECLINING STATUS THIS AM.  
 FAMILY REQUESTED PASTORAL SERVICES.  
CM TO FOLLOW TO ASSIST AS IS APPROPRIATE.
DCP- Discharge Planning
 
Updated by [id number]: Princess Gomes on 7/15/20   6:09 pm CT
CM WAS ADVISED THE FAMILY, HIS SISTERS, WOULD LIKE A HOSPICE CONSULT.  
MD COGNIZANT OF DECISION.  
TC TO [facility]. NO AVAILABLE BEDS FOR [facility] AT [facility].  
CM SPOKE W/ YOVANA. DISCUSSED REFERRAL. FAXED CLINICAL FOR MD REVIEW.  
1310 REC CB FROM EV THAT JOSH WOULD BE ON SITE TO MEET W/ THE FAMILY.  
THE SISTERS WERE AT THE BEDSIDE. EV TO CALL AND SPEAK WITH THE FAMILY.  
0630 JOSH ON SITE FOR EVALUATION AND TO MEET WITH THE FAMILY.  
LONNIE HAD SPOKEN W/ NURSING STAFF. COVID 19 TESTING REQUIRED BY [facility].
 
   
NURSE TO FORWARD INFORMATION.  
AWAIT MD ASSESSMENT AND DECISION.
  
 
 
 
 
 
 
 
 
Last DP export: 7/15/20   6:13 p
Patient Name: SHERI BLAKE
Encounter #: [id number]
Page [phone]
 
 
 
 
 
Electronically Signed by MERVAT GRANGER on 07/15/20 at 1925
 
 
 
 
 
 
**All edits/amendments must be made on the electronic document**
 
DICTATION DATE: 07/15/20 1925     : CHANDRAKANT  07/15/20 1925     
RPT#: [id number]                                DC DATE:        
                                               STATUS: ADM IN  
[facility]
 [place] [address]
***END OF REPORT***

## 2020-07-15 NOTE — MORECARE
CASE MANAGEMENT DISCHARGE SUMMARY
 
 
PATIENT: SHERI BLAKE                     UNIT: [id number]
ACCOUNT#: [id number]                       ADM DATE: 20
AGE: 65     : 53  SEX: M            ROOM/BED: D.2305    
AUTHOR: MERVAT GRANGER                             PHYSICIAN:                               
 
REFERRING PHYSICIAN: PHONG BERG MD                
DATE OF SERVICE: 07/15/20
Discharge Plan
 
 
Patient Name: SHERI BLAKE
[facility]
Encounter #: [id number]
Medical Record #: [id number]
: 1955
Planned Disposition: Hospice Medical Facility
Anticipated Discharge Date: 
 
Discharge Date: 
Expected LOS: 
Initial Reviewer: [email or web address]
Initial Review Date: 2020
Generated: 7/15/20   8:00 pm 
  
 
 
 
 
 
 
 
Patient Name: SHERI BLAKE
 
Encounter #: [id number]
Page [phone]
 
 
 
 
 
Electronically Signed by MERVAT GRANGER on 07/15/20 at 1900
 
 
 
 
 
 
**All edits/amendments must be made on the electronic document**
 
DICTATION DATE: 07/15/20 1900     : CHANDRAKANT  07/15/20 1900     
RPT#: [id number]                                DC DATE:        
                                               STATUS: ADM IN  
[facility]
 [place] [address]
***END OF REPORT***

## 2020-07-15 NOTE — NUR
REC'D REPORT. PT IS . ER DOCTOR HERE AND PRONOUCED. AUROA NOTIFIED,
RECORD OF DEATH IN PROGRESS. NGT REMOVED WITH TIP INTACT. R.WRIST PIV D/C WITH
CATHETER TIP FULLY INTACT. ORAL CARE PROVIDED. FAMILY NOW AT BEDSIDE. WILL
CONTINUE WITH RECORD OF DEATH.

## 2020-07-15 NOTE — NUR
PT LYING IN BED RESTING, NC ON, MONITORS ON AND WORKNG, VSS, FAMILY AT
BEDSIDE, SEE FLOW SHEET FOR FURTHER DETIALS. WILL CONTINUE TO OBSERVE.

## 2020-07-15 NOTE — NUR
REC'D REPORT AND RESUMED CARE, LETHARGIC, WITH SNORING RESPIRATIONS, VSS, NS
INFUSING AT 50 CC/HR, BRIONES TO GRAVITY WITH NO DRAINAGE TO BAG, ASSESSMENT
COMPLETED PER FLOWSHEET, SISTER AT BEDSIDE, AWAITING HOSPICE EVAL AND
PLACEMENT, CALL LIGHT IN REACH, NO NEEDS AT THIS TIME

## 2020-07-15 NOTE — NUR
DR FERGUSON HERE FROM ER AND PRONOUNCEMENT OF DEATH GIVEN, REPORT GIVEN TO VANESSA YUEN FOR CONTINUATION OF CARE

## 2020-07-15 NOTE — NUR
REC'D CALL FROM LAB, COVID SCREEN NEG, NOW LINDSEY PC TO SISTER JOCELYN, SHE
WILL COME TO HOSPITAL RIGHT AWAY,

## 2020-07-15 NOTE — NUR
[facility] HER FOR EVAL, DECISION MADE FOR INPATIENT UNIT AT [facility],
PATIENT CHOICE FORM SIGNED, AWAITING COVID TEST RESULTS BEFORE TRANSFER

## 2020-07-15 NOTE — NUR
[facility]  [facility]. RECORD OF DEATH FORM COMPLETED AND
FAXED TO HOUSE SUPERVISOR. BRIONES CATHETER D/C WITH CATHETER TIP FULLY INTACT.
NO FURTHER NEEDS.

## 2020-07-16 NOTE — MORECARE
CASE MANAGEMENT DISCHARGE SUMMARY
 
 
PATIENT: SHERI BLAKE                     UNIT: [id number]
ACCOUNT#: [id number]                       ADM DATE: 20
AGE: 65     : 53  SEX: M            ROOM/BED: D.2305    
AUTHOR: MERVAT GRANGER                             PHYSICIAN:                               
 
REFERRING PHYSICIAN: PHONG BERG MD                
DATE OF SERVICE: 20
Discharge Plan
 
 
Patient Name: SHERI BLAKE
Facility: [facility]:[place]
Encounter #: [id number]
Medical Record #: [id number]
: 1955
Planned Disposition: Hospice Medical Facility
Anticipated Discharge Date: 
 
Discharge Date: 07/15/2020
Expected LOS: 
Initial Reviewer: [id number]
Initial Review Date: 2020
Generated: 20   2:42 am 
Comments
 
DCP- Discharge Planning
 
Updated by [id number]: Princess Gomes on 7/15/20   6:24 pm CT
PATIENT HAD BEEN A RESIDENT AT [facility] IN LONG TERM BED. 
PHONE NUMBER [phone]. PREVIOUS ADMISSION W/ DISCHARGE 2020.  
EMERGENCY CONTACT IS HIS SISTER ISAIAS WATTERS- [phone].   
PCP- DR DOLL  
[facility]  
PATIENT W/ PROGRESSIVE LETHARGY AND DECLINING STATUS THIS AM.  
 FAMILY REQUESTED PASTORAL SERVICES.  
CM TO FOLLOW TO ASSIST AS IS APPROPRIATE.
DCP- Discharge Planning
 
Updated by [email or web address]: Princess Gomes on 7/15/20   6:09 pm CT
CM WAS ADVISED THE FAMILY, HIS SISTERS, WOULD LIKE A HOSPICE CONSULT.  
MD COGNIZANT OF DECISION.  
TC TO [facility]. NO AVAILABLE BEDS FOR [facility] AT [facility].  
CM SPOKE W/ YOVANA. DISCUSSED REFERRAL. FAXED CLINICAL FOR MD REVIEW.  
1310 REC CB FROM EV THAT JOSH WOULD BE ON SITE TO MEET W/ THE FAMILY.  
THE SISTERS WERE AT THE BEDSIDE. EV TO CALL AND SPEAK WITH THE FAMILY.  
6157 JOSH ON SITE FOR EVALUATION AND TO MEET WITH THE FAMILY.  
LONNIE HAD SPOKEN W/ NURSING STAFF. COVID 19 TESTING REQUIRED BY [facility].
 
   
NURSE TO FORWARD INFORMATION.  
AWAIT MD ASSESSMENT AND DECISION.
  
 
 
 
 
 
 
 
 
Last DP export: 7/15/20   6:25 p
Patient Name: SHERI BLAKE
Encounter #: [id number]
Page 94514
 
 
 
 
 
Electronically Signed by MERVAT GRANGER on 20 at 0142
 
 
 
 
 
 
**All edits/amendments must be made on the electronic document**
 
DICTATION DATE: 20     : CHANDRAKANT  20     
RPT#: [id number]                                DC DATE:07/15/20
                                               STATUS: DIS IN  
[facility]
[address] [place] [address]
***END OF REPORT***
